# Patient Record
Sex: FEMALE | Race: WHITE | NOT HISPANIC OR LATINO | Employment: OTHER | URBAN - METROPOLITAN AREA
[De-identification: names, ages, dates, MRNs, and addresses within clinical notes are randomized per-mention and may not be internally consistent; named-entity substitution may affect disease eponyms.]

---

## 2019-11-12 ENCOUNTER — OFFICE VISIT (OUTPATIENT)
Dept: FAMILY MEDICINE CLINIC | Facility: CLINIC | Age: 64
End: 2019-11-12

## 2019-11-12 VITALS
HEIGHT: 64 IN | DIASTOLIC BLOOD PRESSURE: 80 MMHG | WEIGHT: 172 LBS | OXYGEN SATURATION: 94 % | HEART RATE: 81 BPM | BODY MASS INDEX: 29.37 KG/M2 | SYSTOLIC BLOOD PRESSURE: 140 MMHG

## 2019-11-12 DIAGNOSIS — E78.5 HYPERLIPIDEMIA, UNSPECIFIED HYPERLIPIDEMIA TYPE: ICD-10-CM

## 2019-11-12 DIAGNOSIS — Z86.73 HISTORY OF STROKE: ICD-10-CM

## 2019-11-12 DIAGNOSIS — F33.0 MILD EPISODE OF RECURRENT MAJOR DEPRESSIVE DISORDER (HCC): ICD-10-CM

## 2019-11-12 DIAGNOSIS — K21.9 GASTROESOPHAGEAL REFLUX DISEASE WITHOUT ESOPHAGITIS: ICD-10-CM

## 2019-11-12 DIAGNOSIS — I10 ESSENTIAL HYPERTENSION: Primary | ICD-10-CM

## 2019-11-12 PROBLEM — I63.9 STROKE: Status: ACTIVE | Noted: 2019-11-12

## 2019-11-12 PROCEDURE — 99204 OFFICE O/P NEW MOD 45 MIN: CPT | Performed by: FAMILY MEDICINE

## 2019-11-12 RX ORDER — ATORVASTATIN CALCIUM 40 MG/1
40 TABLET, FILM COATED ORAL DAILY
COMMUNITY
End: 2019-11-12 | Stop reason: SINTOL

## 2019-11-12 RX ORDER — OMEPRAZOLE 40 MG/1
1 CAPSULE, DELAYED RELEASE ORAL 2 TIMES DAILY
COMMUNITY
Start: 2019-10-03 | End: 2019-11-12 | Stop reason: SDUPTHER

## 2019-11-12 RX ORDER — OMEPRAZOLE 40 MG/1
40 CAPSULE, DELAYED RELEASE ORAL 2 TIMES DAILY
Qty: 60 CAPSULE | Refills: 11 | Status: SHIPPED | OUTPATIENT
Start: 2019-11-12 | End: 2020-02-12 | Stop reason: SDUPTHER

## 2019-11-12 RX ORDER — CITALOPRAM 40 MG/1
1 TABLET ORAL DAILY
COMMUNITY
Start: 2019-11-01 | End: 2020-02-12 | Stop reason: SDUPTHER

## 2019-11-12 RX ORDER — BACLOFEN 20 MG/1
1 TABLET ORAL 4 TIMES DAILY
COMMUNITY
Start: 2019-11-11 | End: 2020-02-14 | Stop reason: SDUPTHER

## 2019-11-12 RX ORDER — LISINOPRIL AND HYDROCHLOROTHIAZIDE 20; 12.5 MG/1; MG/1
1 TABLET ORAL DAILY
COMMUNITY
Start: 2019-11-07 | End: 2020-02-12 | Stop reason: SDUPTHER

## 2019-11-12 RX ORDER — OXYBUTYNIN CHLORIDE 5 MG/1
1 TABLET ORAL NIGHTLY
COMMUNITY
Start: 2019-11-01 | End: 2020-03-20 | Stop reason: SDUPTHER

## 2019-11-12 RX ORDER — KETOCONAZOLE 20 MG/ML
SHAMPOO TOPICAL
COMMUNITY
Start: 2019-10-03 | End: 2020-09-22 | Stop reason: SDUPTHER

## 2019-11-12 RX ORDER — AMLODIPINE BESYLATE 5 MG/1
1 TABLET ORAL DAILY
COMMUNITY
Start: 2019-11-07 | End: 2020-02-12 | Stop reason: SDUPTHER

## 2020-02-12 DIAGNOSIS — K21.9 GASTROESOPHAGEAL REFLUX DISEASE WITHOUT ESOPHAGITIS: ICD-10-CM

## 2020-02-12 RX ORDER — LISINOPRIL AND HYDROCHLOROTHIAZIDE 20; 12.5 MG/1; MG/1
1 TABLET ORAL DAILY
Qty: 30 TABLET | Refills: 5 | Status: SHIPPED | OUTPATIENT
Start: 2020-02-12 | End: 2020-03-20 | Stop reason: SDUPTHER

## 2020-02-12 RX ORDER — CITALOPRAM 40 MG/1
40 TABLET ORAL DAILY
Qty: 30 TABLET | Refills: 5 | Status: SHIPPED | OUTPATIENT
Start: 2020-02-12 | End: 2020-03-20 | Stop reason: SDUPTHER

## 2020-02-12 RX ORDER — AMLODIPINE BESYLATE 5 MG/1
5 TABLET ORAL DAILY
Qty: 30 TABLET | Refills: 5 | Status: SHIPPED | OUTPATIENT
Start: 2020-02-12 | End: 2020-03-20 | Stop reason: SDUPTHER

## 2020-02-12 RX ORDER — OMEPRAZOLE 40 MG/1
40 CAPSULE, DELAYED RELEASE ORAL 2 TIMES DAILY
Qty: 60 CAPSULE | Refills: 11 | Status: SHIPPED | OUTPATIENT
Start: 2020-02-12 | End: 2020-03-20 | Stop reason: SDUPTHER

## 2020-02-12 NOTE — TELEPHONE ENCOUNTER
I have loaded all of the prescriptions that you mentioned in your last note. Please advise on the rest of her prescriptions.

## 2020-02-12 NOTE — TELEPHONE ENCOUNTER
PT CALLED REQUESTING REFILLS ALL MEDICATIONS WITH REFILLS    [facility]  [facility]    PT CALL BACK [phone]

## 2020-02-12 NOTE — TELEPHONE ENCOUNTER
I believe she should still see her neurologist regarding the baclofen, but did not recall discussing it directly with her.  Similar to her , if we are needing to take other medications over from other providers I am happy to if we can get their records, otherwise approve the included meds.

## 2020-02-14 RX ORDER — BACLOFEN 20 MG/1
20 TABLET ORAL 4 TIMES DAILY
Qty: 120 TABLET | Refills: 0 | Status: SHIPPED | OUTPATIENT
Start: 2020-02-14 | End: 2020-03-11

## 2020-02-20 ENCOUNTER — TELEPHONE (OUTPATIENT)
Dept: FAMILY MEDICINE CLINIC | Facility: CLINIC | Age: 65
End: 2020-02-20

## 2020-02-20 DIAGNOSIS — J30.89 ALLERGIC RHINITIS DUE TO OTHER ALLERGIC TRIGGER, UNSPECIFIED SEASONALITY: Primary | ICD-10-CM

## 2020-02-20 NOTE — TELEPHONE ENCOUNTER
Patient called in regards to needing a prescription on nasal spray (FLUTICASONE) PROPIONATE.     Sent to the [facility] pharmacy on [address]     Please advise pt at [phone].

## 2020-02-21 RX ORDER — FLUTICASONE PROPIONATE 50 MCG
1 SPRAY, SUSPENSION (ML) NASAL DAILY
Qty: 9.9 ML | Refills: 11 | Status: SHIPPED | OUTPATIENT
Start: 2020-02-21 | End: 2020-03-20 | Stop reason: SDUPTHER

## 2020-03-11 RX ORDER — BACLOFEN 20 MG/1
TABLET ORAL
Qty: 120 TABLET | Refills: 0 | Status: SHIPPED | OUTPATIENT
Start: 2020-03-11 | End: 2020-03-20 | Stop reason: SDUPTHER

## 2020-03-20 DIAGNOSIS — J30.89 ALLERGIC RHINITIS DUE TO OTHER ALLERGIC TRIGGER, UNSPECIFIED SEASONALITY: ICD-10-CM

## 2020-03-20 DIAGNOSIS — K21.9 GASTROESOPHAGEAL REFLUX DISEASE WITHOUT ESOPHAGITIS: ICD-10-CM

## 2020-03-20 RX ORDER — AMLODIPINE BESYLATE 5 MG/1
5 TABLET ORAL DAILY
Qty: 30 TABLET | Refills: 5 | Status: SHIPPED | OUTPATIENT
Start: 2020-03-20 | End: 2020-10-22

## 2020-03-20 RX ORDER — CITALOPRAM 40 MG/1
40 TABLET ORAL DAILY
Qty: 30 TABLET | Refills: 5 | Status: SHIPPED | OUTPATIENT
Start: 2020-03-20 | End: 2020-10-07

## 2020-03-20 RX ORDER — FLUTICASONE PROPIONATE 50 MCG
1 SPRAY, SUSPENSION (ML) NASAL DAILY
Qty: 9.9 ML | Refills: 11 | Status: SHIPPED | OUTPATIENT
Start: 2020-03-20 | End: 2021-01-14 | Stop reason: SDUPTHER

## 2020-03-20 RX ORDER — BACLOFEN 20 MG/1
20 TABLET ORAL 4 TIMES DAILY
Qty: 120 TABLET | Refills: 0 | Status: SHIPPED | OUTPATIENT
Start: 2020-03-20 | End: 2020-05-07

## 2020-03-20 RX ORDER — OXYBUTYNIN CHLORIDE 5 MG/1
5 TABLET ORAL NIGHTLY
Qty: 30 TABLET | Refills: 1 | Status: SHIPPED | OUTPATIENT
Start: 2020-03-20 | End: 2021-09-09 | Stop reason: SDUPTHER

## 2020-03-20 RX ORDER — OMEPRAZOLE 40 MG/1
40 CAPSULE, DELAYED RELEASE ORAL 2 TIMES DAILY
Qty: 60 CAPSULE | Refills: 11 | Status: SHIPPED | OUTPATIENT
Start: 2020-03-20 | End: 2021-01-14 | Stop reason: SDUPTHER

## 2020-03-20 RX ORDER — LISINOPRIL AND HYDROCHLOROTHIAZIDE 20; 12.5 MG/1; MG/1
1 TABLET ORAL DAILY
Qty: 30 TABLET | Refills: 5 | Status: SHIPPED | OUTPATIENT
Start: 2020-03-20 | End: 2020-10-07

## 2020-05-07 RX ORDER — BACLOFEN 20 MG/1
TABLET ORAL
Qty: 120 TABLET | Refills: 0 | Status: SHIPPED | OUTPATIENT
Start: 2020-05-07 | End: 2020-07-01

## 2020-05-13 RX ORDER — BACLOFEN 20 MG/1
TABLET ORAL
Qty: 120 TABLET | Refills: 0 | OUTPATIENT
Start: 2020-05-13

## 2020-05-26 ENCOUNTER — OFFICE VISIT (OUTPATIENT)
Dept: FAMILY MEDICINE CLINIC | Facility: CLINIC | Age: 65
End: 2020-05-26

## 2020-05-26 VITALS
WEIGHT: 155 LBS | HEART RATE: 79 BPM | HEIGHT: 64 IN | BODY MASS INDEX: 26.46 KG/M2 | DIASTOLIC BLOOD PRESSURE: 78 MMHG | SYSTOLIC BLOOD PRESSURE: 122 MMHG | OXYGEN SATURATION: 96 %

## 2020-05-26 DIAGNOSIS — R06.02 SHORTNESS OF BREATH: ICD-10-CM

## 2020-05-26 DIAGNOSIS — F33.0 MILD EPISODE OF RECURRENT MAJOR DEPRESSIVE DISORDER (HCC): ICD-10-CM

## 2020-05-26 DIAGNOSIS — E78.5 HYPERLIPIDEMIA, UNSPECIFIED HYPERLIPIDEMIA TYPE: ICD-10-CM

## 2020-05-26 DIAGNOSIS — Z87.891 PERSONAL HISTORY OF TOBACCO USE, PRESENTING HAZARDS TO HEALTH: ICD-10-CM

## 2020-05-26 DIAGNOSIS — R73.9 HYPERGLYCEMIA: ICD-10-CM

## 2020-05-26 DIAGNOSIS — Z00.00 PREVENTATIVE HEALTH CARE: ICD-10-CM

## 2020-05-26 DIAGNOSIS — Z00.00 MEDICARE ANNUAL WELLNESS VISIT, SUBSEQUENT: Primary | ICD-10-CM

## 2020-05-26 DIAGNOSIS — I10 ESSENTIAL HYPERTENSION: ICD-10-CM

## 2020-05-26 PROCEDURE — 99407 BEHAV CHNG SMOKING > 10 MIN: CPT | Performed by: FAMILY MEDICINE

## 2020-05-26 PROCEDURE — 99396 PREV VISIT EST AGE 40-64: CPT | Performed by: FAMILY MEDICINE

## 2020-05-26 PROCEDURE — G0439 PPPS, SUBSEQ VISIT: HCPCS | Performed by: FAMILY MEDICINE

## 2020-05-26 PROCEDURE — 96160 PT-FOCUSED HLTH RISK ASSMT: CPT | Performed by: FAMILY MEDICINE

## 2020-05-26 NOTE — PROGRESS NOTES
QUICK REFERENCE INFORMATION:  The ABCs of the Annual Wellness Visit    Medicare Subsequent Wellness Visit    Chief Complaint   Patient presents with   • Medicare Wellness-subsequent        HPI     Johnna Contreras is a 64 y.o. female presenting for subsequent annual wellness visit.    Chronic health conditions:  HTN: Caused a stroke in 2015. Has been well controlled since then  HLD: Previously placed on atorvastatin but had elevated liver enzymes and had to stop  Wheelchair-bound since stroke. Can walk with cane but not well  Depression: Stable on citalopram, recently increased to 40mg  GERD: Does well with omeprazole 40mg BID  Smoker: Cut from 2 ppd down 1/2 ppd  Had been going to the gym regularly, improved her diet, lost almost 25 lbs up until pandemic. Was doing leg presses up to 60lbs each.    Other physicians currently involved in patient's care:  Dr. Yoly angel in arm and leg every 3 months, just had done a few weeks ago.    This patient is accompanied by their self who contributes to the history of their care.    Past Medical History:   Diagnosis Date   • GERD (gastroesophageal reflux disease)    • Headache    • Hyperlipidemia    • Hypertension    • Stroke (CMS/HCC)       Past Surgical History:   Procedure Laterality Date   • DILATATION AND CURETTAGE     • EYE SURGERY     • TONSILLECTOMY     • TUBAL ABDOMINAL LIGATION       Family History   Problem Relation Age of Onset   • Heart attack Mother    • Stroke Mother    • Hypertension Mother    • Hyperlipidemia Mother    • Diabetes Father    • Cancer Paternal Grandmother    • Stroke Paternal Grandmother    • Heart attack Paternal Grandmother       Social History     Socioeconomic History   • Marital status:      Spouse name: Not on file   • Number of children: Not on file   • Years of education: Not on file   • Highest education level: Not on file   Tobacco Use   • Smoking status: Current Every Day Smoker     Packs/day: 0.50   • Smokeless tobacco: Never  "Used   Substance and Sexual Activity   • Alcohol use: Yes     Frequency: Never     Comment: rarely    • Drug use: No      No Known Allergies   Outpatient Medications Prior to Visit   Medication Sig Dispense Refill   • amLODIPine (NORVASC) 5 MG tablet Take 1 tablet by mouth Daily. 30 tablet 5   • aspirin 81 MG tablet Take 1 tablet by mouth Daily.     • baclofen (LIORESAL) 20 MG tablet TAKE 1 TABLET FOUR TIMES DAILY 120 tablet 0   • citalopram (CeleXA) 40 MG tablet Take 1 tablet by mouth Daily. 30 tablet 5   • fluticasone (Flonase) 50 MCG/ACT nasal spray 1 spray into the nostril(s) as directed by provider Daily. 9.9 mL 11   • hydrocortisone (ANUSOL-HC) 2.5 % rectal cream Insert  into the rectum 2 (Two) Times a Day.     • ketoconazole (NIZORAL) 2 % shampoo      • lisinopril-hydrochlorothiazide (PRINZIDE,ZESTORETIC) 20-12.5 MG per tablet Take 1 tablet by mouth Daily. 30 tablet 5   • omeprazole (priLOSEC) 40 MG capsule Take 1 capsule by mouth 2 (Two) Times a Day. 60 capsule 11   • oxybutynin (DITROPAN) 5 MG tablet Take 1 tablet by mouth Every Night. 30 tablet 1     No facility-administered medications prior to visit.        Reviewed use of high risk medication in the elderly: yes  Reviewed for potential of harmful drug interactions in the elderly: yes    The following portions of the patient's history were reviewed and updated as appropriate: allergies, current medications, past family history, past medical history, past social history, past surgical history and problem list.    Review of Systems   Review of Systems -  General ROS: negative for - chills, fever or night sweats  Cardiovascular ROS: no chest pain or dyspnea on exertion  Gastrointestinal ROS: no abdominal pain, change in bowel habits, or black or bloody stools  Genito-Urinary ROS: no dysuria, trouble voiding, or hematuria.    Vitals:    05/26/20 0810   BP: 122/78   Pulse: 79   SpO2: 96%   Weight: 70.3 kg (155 lb)   Height: 162.6 cm (64\")       Objective  "   Physical Exam   Const: NAD, A&Ox4, Pleasant, Cooperative  Eyes: EOMI, no conjunctivitis  ENT: No nasal discharge present, neck supple  Cardiac: Regular rate and rhythm, no cyanosis  Resp: Respiratory rate within normal limits, no increased work of breathing, no audible wheezing or retractions noted  GI: No distention or ascites  MSK: Motor and sensation grossly intact in bilateral upper extremities  Neurologic: CN II-XII grossly intact  Psych: Appropriate mood and behavior.  HEALTH RISK ASSESSMENT    1955    Recent Hospitalizations:  No hospitalization(s) within the last year..      Current Medical Providers:  Patient Care Team:  Ras Padilla DO as PCP - General (Family Medicine)      Smoking Status:  Social History     Tobacco Use   Smoking Status Current Every Day Smoker   • Packs/day: 0.50   Smokeless Tobacco Never Used       Alcohol Consumption:  Social History     Substance and Sexual Activity   Alcohol Use Yes   • Frequency: Never    Comment: rarely        Depression Screen:   No flowsheet data found.    Health Habits and Functional and Cognitive Screening:  Functional & Cognitive Status 5/26/2020   Do you have difficulty preparing food and eating? No   Do you have difficulty bathing yourself, getting dressed or grooming yourself? No   Do you have difficulty using the toilet? No   Do you have difficulty moving around from place to place? No   Do you have trouble with steps or getting out of a bed or a chair? No   Current Diet Limited Junk Food   Dental Exam Not up to date   Eye Exam Up to date   Exercise (times per week) 0 times per week   Current Exercise Activities Include None   Do you need help using the phone?  No   Are you deaf or do you have serious difficulty hearing?  Yes   Do you need help with transportation? Yes   Do you need help shopping? No   Do you need help preparing meals?  No   Do you need help with housework?  Yes   Do you need help with laundry? No   Do you need help taking  your medications? No   Do you need help managing money? No   Do you ever drive or ride in a car without wearing a seat belt? No   Have you felt unusual stress, anger or loneliness in the last month? Yes   Who do you live with? Spouse   If you need help, do you have trouble finding someone available to you? No   Have you been bothered in the last four weeks by sexual problems? No   Do you have difficulty concentrating, remembering or making decisions? No           Does the patient have evidence of cognitive impairment? No    Aspirin use counseling? Taking ASA appropriately as indicated      Recent Lab Results:  CMP:  Lab Results   Component Value Date    BUN 35 (H) 05/07/2015    CREATININE 1.0 05/07/2015     05/07/2015    K 3.6 05/07/2015    CO2 25 05/07/2015    CALCIUM 9.3 05/07/2015    ALBUMIN 4.0 05/03/2015    BILITOT 0.4 05/03/2015    ALKPHOS 103 (H) 05/03/2015    AST 24 05/03/2015    ALT 21 05/03/2015     Lipid Panel:  Lab Results   Component Value Date    TRIG 65 05/03/2015    HDL 80 (H) 05/03/2015     HbA1c:  Lab Results   Component Value Date    HGBA1C 5.4 05/03/2015       Visual Acuity:  No exam data present    Age-appropriate Screening Schedule:  Refer to the list below for future screening recommendations based on patient's age, sex and/or medical conditions. Orders for these recommended tests are listed in the plan section. The patient has been provided with a written plan.    Health Maintenance   Topic Date Due   • MAMMOGRAM  1955   • TDAP/TD VACCINES (1 - Tdap) 06/09/1967   • PNEUMOCOCCAL VACCINE (19-64 MEDIUM RISK) (1 of 1 - PPSV23) 06/09/1975   • ZOSTER VACCINE (1 of 2) 06/09/2006   • PAP SMEAR  11/12/2019   • LIPID PANEL  11/12/2019   • COLONOSCOPY  11/12/2019   • INFLUENZA VACCINE  08/01/2020          Advance Care Planning:  ACP discussion was held with the patient during this visit. Patient does not have an advance directive, information provided.    Identification of Risk  Factors:  Risk factors include: Advance Directive Discussion  Cardiovascular risk  Depression/Dysphoria  Diabetic Lab Screening   Fall Risk  Lung Cancer Risk  Tobacco Use/Dependance (use dotphrase .tobaccocessation for documentation)  Urinary Incontinence.    Compared to one year ago, the patient feels her physical health is better.  Compared to one year ago, the patient feels her mental health is the same.      Johnna was seen today for medicare wellness-subsequent.    Diagnoses and all orders for this visit:    Medicare annual wellness visit, subsequent  -     Comprehensive Metabolic Panel; Future  -     CBC & Differential; Future  -     High Sensitivity CRP; Future  -     Lipid Panel; Future  -     TSH Rfx On Abnormal To Free T4; Future  -     Urinalysis With Microscopic If Indicated (No Culture) - Urine, Clean Catch; Future    Preventative health care  -     Comprehensive Metabolic Panel; Future  -     CBC & Differential; Future  -     High Sensitivity CRP; Future  -     Lipid Panel; Future  -     TSH Rfx On Abnormal To Free T4; Future  -     Urinalysis With Microscopic If Indicated (No Culture) - Urine, Clean Catch; Future    Hyperlipidemia, unspecified hyperlipidemia type  -     Comprehensive Metabolic Panel; Future  -     CBC & Differential; Future  -     High Sensitivity CRP; Future  -     Lipid Panel; Future  -     TSH Rfx On Abnormal To Free T4; Future  -     Urinalysis With Microscopic If Indicated (No Culture) - Urine, Clean Catch; Future    Mild episode of recurrent major depressive disorder (CMS/HCC)  -     Comprehensive Metabolic Panel; Future  -     CBC & Differential; Future  -     Lipid Panel; Future  -     TSH Rfx On Abnormal To Free T4; Future  -     Urinalysis With Microscopic If Indicated (No Culture) - Urine, Clean Catch; Future    Essential hypertension  -     Comprehensive Metabolic Panel; Future  -     CBC & Differential; Future  -     High Sensitivity CRP; Future  -     Lipid Panel;  Future  -     TSH Rfx On Abnormal To Free T4; Future  -     Urinalysis With Microscopic If Indicated (No Culture) - Urine, Clean Catch; Future    Shortness of breath  -     Comprehensive Metabolic Panel; Future  -     CBC & Differential; Future  -     High Sensitivity CRP; Future  -     Lipid Panel; Future  -     TSH Rfx On Abnormal To Free T4; Future  -     Urinalysis With Microscopic If Indicated (No Culture) - Urine, Clean Catch; Future  -     proBNP; Future    Hyperglycemia  -     Hemoglobin A1c; Future    Personal history of tobacco use, presenting hazards to health  -     varenicline (Chantix Starting Month Baljeet) 0.5 MG X 11 & 1 MG X 42 tablet; Take 0.5 mg one daily on days 1-3 and and 0.5 mg twice daily on days 4-7.Then 1 mg twice daily for a total of 12 weeks.  -     CT Chest Low Dose Wo; Future        Procedure   Procedures       Patient Self-Management and Personalized Health Advice  The patient has been provided with information about: diet, exercise, weight management, tobacco cessation and designing advance directives and preventive services including:   · Annual Wellness Visit (AWV)  · Counseling to Prevent Tobacco Use (use of smartset and @cessation@ smartphrase for documentation)  · Depression Screening (15 minutes face to face, Code )  · Lung Cancer Screening Counseling and Annual Screening for Lung Cancer with Low Dose Computed Tomography (LDCT); (use of smartset for low dose CT for lung cancer screening for documentation and orders).    Johnna was seen today for medicare wellness-subsequent.    Diagnoses and all orders for this visit:    Medicare annual wellness visit, subsequent  -     Comprehensive Metabolic Panel; Future  -     CBC & Differential; Future  -     High Sensitivity CRP; Future  -     Lipid Panel; Future  -     TSH Rfx On Abnormal To Free T4; Future  -     Urinalysis With Microscopic If Indicated (No Culture) - Urine, Clean Catch; Future    Preventative health care  -      Comprehensive Metabolic Panel; Future  -     CBC & Differential; Future  -     High Sensitivity CRP; Future  -     Lipid Panel; Future  -     TSH Rfx On Abnormal To Free T4; Future  -     Urinalysis With Microscopic If Indicated (No Culture) - Urine, Clean Catch; Future    Hyperlipidemia, unspecified hyperlipidemia type  -     Comprehensive Metabolic Panel; Future  -     CBC & Differential; Future  -     High Sensitivity CRP; Future  -     Lipid Panel; Future  -     TSH Rfx On Abnormal To Free T4; Future  -     Urinalysis With Microscopic If Indicated (No Culture) - Urine, Clean Catch; Future    Mild episode of recurrent major depressive disorder (CMS/HCC)  -     Comprehensive Metabolic Panel; Future  -     CBC & Differential; Future  -     Lipid Panel; Future  -     TSH Rfx On Abnormal To Free T4; Future  -     Urinalysis With Microscopic If Indicated (No Culture) - Urine, Clean Catch; Future    Essential hypertension  -     Comprehensive Metabolic Panel; Future  -     CBC & Differential; Future  -     High Sensitivity CRP; Future  -     Lipid Panel; Future  -     TSH Rfx On Abnormal To Free T4; Future  -     Urinalysis With Microscopic If Indicated (No Culture) - Urine, Clean Catch; Future    Shortness of breath  -     Comprehensive Metabolic Panel; Future  -     CBC & Differential; Future  -     High Sensitivity CRP; Future  -     Lipid Panel; Future  -     TSH Rfx On Abnormal To Free T4; Future  -     Urinalysis With Microscopic If Indicated (No Culture) - Urine, Clean Catch; Future  -     proBNP; Future    Hyperglycemia  -     Hemoglobin A1c; Future    Personal history of tobacco use, presenting hazards to health  -     varenicline (Chantix Starting Month Pak) 0.5 MG X 11 & 1 MG X 42 tablet; Take 0.5 mg one daily on days 1-3 and and 0.5 mg twice daily on days 4-7.Then 1 mg twice daily for a total of 12 weeks.  -     CT Chest Low Dose Wo; Future        Problem List Items Addressed This Visit         Cardiovascular and Mediastinum    Hyperlipidemia    Relevant Orders    Comprehensive Metabolic Panel    CBC & Differential    High Sensitivity CRP    Lipid Panel    TSH Rfx On Abnormal To Free T4    Urinalysis With Microscopic If Indicated (No Culture) - Urine, Clean Catch    Hypertension    Relevant Orders    Comprehensive Metabolic Panel    CBC & Differential    High Sensitivity CRP    Lipid Panel    TSH Rfx On Abnormal To Free T4    Urinalysis With Microscopic If Indicated (No Culture) - Urine, Clean Catch      Other Visit Diagnoses     Medicare annual wellness visit, subsequent    -  Primary    Relevant Orders    Comprehensive Metabolic Panel    CBC & Differential    High Sensitivity CRP    Lipid Panel    TSH Rfx On Abnormal To Free T4    Urinalysis With Microscopic If Indicated (No Culture) - Urine, Clean Catch    Preventative health care        Relevant Orders    Comprehensive Metabolic Panel    CBC & Differential    High Sensitivity CRP    Lipid Panel    TSH Rfx On Abnormal To Free T4    Urinalysis With Microscopic If Indicated (No Culture) - Urine, Clean Catch    Mild episode of recurrent major depressive disorder (CMS/HCC)        Relevant Medications    varenicline (Chantix Starting Month Baljeet) 0.5 MG X 11 & 1 MG X 42 tablet    Other Relevant Orders    Comprehensive Metabolic Panel    CBC & Differential    Lipid Panel    TSH Rfx On Abnormal To Free T4    Urinalysis With Microscopic If Indicated (No Culture) - Urine, Clean Catch    Shortness of breath        Relevant Orders    Comprehensive Metabolic Panel    CBC & Differential    High Sensitivity CRP    Lipid Panel    TSH Rfx On Abnormal To Free T4    Urinalysis With Microscopic If Indicated (No Culture) - Urine, Clean Catch    proBNP    Hyperglycemia        Relevant Orders    Hemoglobin A1c    Personal history of tobacco use, presenting hazards to health        Relevant Medications    varenicline (Chantix Starting Month Baljeet) 0.5 MG X 11 & 1 MG X 42 tablet     Other Relevant Orders    CT Chest Low Dose Wo        #1 hypertension  Adequate control on combination amlodipine 5 mg, lisinopril-HCTZ 20-12.5 mg     #2 hyperlipidemia  She has previously taken atorvastatin but had to stop per patient for elevated liver enzymes  She does have a history of stroke and should be on a statin in the future     3.  Major depressive disorder  Continue citalopram 40 mg daily     #4 GERD  Continue omeprazole 40 mg daily     #5 history of stroke  Continue daily aspirin  As stated above, she should be on a statin daily, with her history of elevated liver enzymes we discussed this, she wanted to hold off and check her cholesterol in the spring (today).  She is counseled that irrespective of her cholesterol level, unless her LDL is pretty naturally low, a statin would benefit her.    #5 personal history of tobacco use  Currently at half pack per day, previously a 2 pack/day for greater than 40 years.  She previously did well with Chantix, decreasing from 2 packs to half pack, but was lost to follow-up.  Johnna BERNARD Ben  reports that she has been smoking. She has been smoking about 0.50 packs per day. She has never used smokeless tobacco.. I have educated her on the risk of diseases from using tobacco products such as cancer, COPD and heart diease.     I advised her to quit and she is willing to quit. We have discussed the following method/s for tobacco cessation:  Education Material Prescription Medicaiton.  Together we have set a quit date for 2 weeks from today.  She will follow up with me in 3 months or sooner to check on her progress.    I spent 15 minutes counseling the patient.  -She is also in need of yearly low-dose CT    Patient Instructions   Health Maintenance, Female  Adopting a healthy lifestyle and getting preventive care are important in promoting health and wellness. Ask your health care provider about:  · The right schedule for you to have regular tests and exams.  · Things  you can do on your own to prevent diseases and keep yourself healthy.  What should I know about diet, weight, and exercise?  Eat a healthy diet    · Eat a diet that includes plenty of vegetables, fruits, low-fat dairy products, and lean protein.  · Do not eat a lot of foods that are high in solid fats, added sugars, or sodium.  Maintain a healthy weight  Body mass index (BMI) is used to identify weight problems. It estimates body fat based on height and weight. Your health care provider can help determine your BMI and help you achieve or maintain a healthy weight.  Get regular exercise  Get regular exercise. This is one of the most important things you can do for your health. Most adults should:  · Exercise for at least 150 minutes each week. The exercise should increase your heart rate and make you sweat (moderate-intensity exercise).  · Do strengthening exercises at least twice a week. This is in addition to the moderate-intensity exercise.  · Spend less time sitting. Even light physical activity can be beneficial.  Watch cholesterol and blood lipids  Have your blood tested for lipids and cholesterol at 20 years of age, then have this test every 5 years.  Have your cholesterol levels checked more often if:  · Your lipid or cholesterol levels are high.  · You are older than 40 years of age.  · You are at high risk for heart disease.  What should I know about cancer screening?  Depending on your health history and family history, you may need to have cancer screening at various ages. This may include screening for:  · Breast cancer.  · Cervical cancer.  · Colorectal cancer.  · Skin cancer.  · Lung cancer.  What should I know about heart disease, diabetes, and high blood pressure?  Blood pressure and heart disease  · High blood pressure causes heart disease and increases the risk of stroke. This is more likely to develop in people who have high blood pressure readings, are of  descent, or are  overweight.  · Have your blood pressure checked:  ? Every 3-5 years if you are 18-39 years of age.  ? Every year if you are 40 years old or older.  Diabetes  Have regular diabetes screenings. This checks your fasting blood sugar level. Have the screening done:  · Once every three years after age 40 if you are at a normal weight and have a low risk for diabetes.  · More often and at a younger age if you are overweight or have a high risk for diabetes.  What should I know about preventing infection?  Hepatitis B  If you have a higher risk for hepatitis B, you should be screened for this virus. Talk with your health care provider to find out if you are at risk for hepatitis B infection.  Hepatitis C  Testing is recommended for:  · Everyone born from 1945 through 1965.  · Anyone with known risk factors for hepatitis C.  Sexually transmitted infections (STIs)  · Get screened for STIs, including gonorrhea and chlamydia, if:  ? You are sexually active and are younger than 24 years of age.  ? You are older than 24 years of age and your health care provider tells you that you are at risk for this type of infection.  ? Your sexual activity has changed since you were last screened, and you are at increased risk for chlamydia or gonorrhea. Ask your health care provider if you are at risk.  · Ask your health care provider about whether you are at high risk for HIV. Your health care provider may recommend a prescription medicine to help prevent HIV infection. If you choose to take medicine to prevent HIV, you should first get tested for HIV. You should then be tested every 3 months for as long as you are taking the medicine.  Pregnancy  · If you are about to stop having your period (premenopausal) and you may become pregnant, seek counseling before you get pregnant.  · Take 400 to 800 micrograms (mcg) of folic acid every day if you become pregnant.  · Ask for birth control (contraception) if you want to prevent  pregnancy.  Osteoporosis and menopause  Osteoporosis is a disease in which the bones lose minerals and strength with aging. This can result in bone fractures. If you are 65 years old or older, or if you are at risk for osteoporosis and fractures, ask your health care provider if you should:  · Be screened for bone loss.  · Take a calcium or vitamin D supplement to lower your risk of fractures.  · Be given hormone replacement therapy (HRT) to treat symptoms of menopause.  Follow these instructions at home:  Lifestyle  · Do not use any products that contain nicotine or tobacco, such as cigarettes, e-cigarettes, and chewing tobacco. If you need help quitting, ask your health care provider.  · Do not use street drugs.  · Do not share needles.  · Ask your health care provider for help if you need support or information about quitting drugs.  Alcohol use  · Do not drink alcohol if:  ? Your health care provider tells you not to drink.  ? You are pregnant, may be pregnant, or are planning to become pregnant.  · If you drink alcohol:  ? Limit how much you use to 0-1 drink a day.  ? Limit intake if you are breastfeeding.  · Be aware of how much alcohol is in your drink. In the [place] one drink equals one 12 oz bottle of beer (355 mL), one 5 oz glass of wine (148 mL), or one 1½ oz glass of hard liquor (44 mL).  General instructions  · Schedule regular health, dental, and eye exams.  · Stay current with your vaccines.  · Tell your health care provider if:  ? You often feel depressed.  ? You have ever been abused or do not feel safe at home.  Summary  · Adopting a healthy lifestyle and getting preventive care are important in promoting health and wellness.  · Follow your health care provider's instructions about healthy diet, exercising, and getting tested or screened for diseases.  · Follow your health care provider's instructions on monitoring your cholesterol and blood pressure.  This information is not intended to replace  advice given to you by your health care provider. Make sure you discuss any questions you have with your health care provider.  Document Released: 07/02/2012 Document Revised: 12/11/2019 Document Reviewed: 12/11/2019  [employer] Patient Education © 2020 [employer]        The wellness exam has been reviewed in detail.  The patient has been fully counseled on preventative guidelines for vaccines, cancer screenings, and other health maintenance needs.  Functional testing has been performed to assess capacity for independent living and need for other medical interventions.  Screening for depression was completed via a validated screening model as indicated above, 15 minutes was spent in total on depression screening.  The patient was counseled on maintaining a lifestyle to promote good health and to minimize chronic diseases.  The patient has been assisted with scheduling healthcare procedures for the coming year and given a written document outlining these recommendations.    Follow Up:  Return in about 3 months (around 8/26/2020).     An After Visit Summary and PPPS with all of these plans were given to the patient.

## 2020-05-26 NOTE — PATIENT INSTRUCTIONS

## 2020-06-04 ENCOUNTER — TELEPHONE (OUTPATIENT)
Dept: FAMILY MEDICINE CLINIC | Facility: CLINIC | Age: 65
End: 2020-06-04

## 2020-06-04 DIAGNOSIS — Z87.891 PERSONAL HISTORY OF TOBACCO USE, PRESENTING HAZARDS TO HEALTH: ICD-10-CM

## 2020-06-04 NOTE — TELEPHONE ENCOUNTER
Carissa with [facility] called and stated that refills are needed on the following prescription(s):    varenicline (Chantix Starting Month Baljeet) 0.5 MG X 11 & 1 MG X 42 tablet    Directions: continue 1 Mg twice daily for 12 weeks.  An additional prescription will be needed    Pharmacy: [facility]  PH: [phone]  FX: [phone]    Please advise

## 2020-06-10 RX ORDER — VARENICLINE TARTRATE 1 MG/1
1 TABLET, FILM COATED ORAL 2 TIMES DAILY
Qty: 120 TABLET | Refills: 2 | Status: SHIPPED | OUTPATIENT
Start: 2020-06-10 | End: 2021-06-04 | Stop reason: SDUPTHER

## 2020-07-01 RX ORDER — BACLOFEN 20 MG/1
TABLET ORAL
Qty: 120 TABLET | Refills: 0 | Status: SHIPPED | OUTPATIENT
Start: 2020-07-01 | End: 2020-07-28

## 2020-07-28 RX ORDER — BACLOFEN 20 MG/1
TABLET ORAL
Qty: 120 TABLET | Refills: 0 | Status: SHIPPED | OUTPATIENT
Start: 2020-07-28 | End: 2020-09-15

## 2020-08-26 ENCOUNTER — OFFICE VISIT (OUTPATIENT)
Dept: FAMILY MEDICINE CLINIC | Facility: CLINIC | Age: 65
End: 2020-08-26

## 2020-08-26 ENCOUNTER — LAB REQUISITION (OUTPATIENT)
Dept: LAB | Facility: HOSPITAL | Age: 65
End: 2020-08-26

## 2020-08-26 VITALS
WEIGHT: 146 LBS | BODY MASS INDEX: 24.92 KG/M2 | DIASTOLIC BLOOD PRESSURE: 68 MMHG | OXYGEN SATURATION: 98 % | SYSTOLIC BLOOD PRESSURE: 138 MMHG | HEIGHT: 64 IN | HEART RATE: 63 BPM

## 2020-08-26 DIAGNOSIS — I10 ESSENTIAL HYPERTENSION: Primary | ICD-10-CM

## 2020-08-26 DIAGNOSIS — Z00.00 ROUTINE GENERAL MEDICAL EXAMINATION AT A HEALTH CARE FACILITY: ICD-10-CM

## 2020-08-26 DIAGNOSIS — Z87.891 PERSONAL HISTORY OF TOBACCO USE, PRESENTING HAZARDS TO HEALTH: ICD-10-CM

## 2020-08-26 PROCEDURE — 99214 OFFICE O/P EST MOD 30 MIN: CPT | Performed by: FAMILY MEDICINE

## 2020-08-27 LAB
ALBUMIN SERPL-MCNC: 4.5 G/DL (ref 3.5–5.2)
ALBUMIN/GLOB SERPL: 1.8 G/DL
ALP SERPL-CCNC: 84 U/L (ref 39–117)
ALT SERPL-CCNC: 17 U/L (ref 1–33)
APPEARANCE UR: CLEAR
AST SERPL-CCNC: 16 U/L (ref 1–32)
BACTERIA #/AREA URNS HPF: ABNORMAL /HPF
BASOPHILS # BLD AUTO: 0.05 10*3/MM3 (ref 0–0.2)
BASOPHILS NFR BLD AUTO: 0.6 % (ref 0–1.5)
BILIRUB SERPL-MCNC: 0.3 MG/DL (ref 0–1.2)
BILIRUB UR QL STRIP: NEGATIVE
BUN SERPL-MCNC: 10 MG/DL (ref 8–23)
BUN/CREAT SERPL: 13.9 (ref 7–25)
CALCIUM SERPL-MCNC: 9.6 MG/DL (ref 8.6–10.5)
CASTS URNS MICRO: ABNORMAL
CHLORIDE SERPL-SCNC: 94 MMOL/L (ref 98–107)
CHOLEST SERPL-MCNC: 190 MG/DL (ref 0–200)
CO2 SERPL-SCNC: 26.4 MMOL/L (ref 22–29)
COLOR UR: YELLOW
CREAT SERPL-MCNC: 0.72 MG/DL (ref 0.57–1)
CRP SERPL HS-MCNC: 2.7 MG/L (ref 0–3)
EOSINOPHIL # BLD AUTO: 0.14 10*3/MM3 (ref 0–0.4)
EOSINOPHIL NFR BLD AUTO: 1.7 % (ref 0.3–6.2)
EPI CELLS #/AREA URNS HPF: ABNORMAL /HPF
ERYTHROCYTE [DISTWIDTH] IN BLOOD BY AUTOMATED COUNT: 13.7 % (ref 12.3–15.4)
GLOBULIN SER CALC-MCNC: 2.5 GM/DL
GLUCOSE SERPL-MCNC: 90 MG/DL (ref 65–99)
GLUCOSE UR QL: NEGATIVE
HBA1C MFR BLD: 6.2 % (ref 4.8–5.6)
HCT VFR BLD AUTO: 41 % (ref 34–46.6)
HDLC SERPL-MCNC: 66 MG/DL (ref 40–60)
HGB BLD-MCNC: 13.8 G/DL (ref 12–15.9)
HGB UR QL STRIP: NEGATIVE
IMM GRANULOCYTES # BLD AUTO: 0.01 10*3/MM3 (ref 0–0.05)
IMM GRANULOCYTES NFR BLD AUTO: 0.1 % (ref 0–0.5)
KETONES UR QL STRIP: NEGATIVE
LDLC SERPL CALC-MCNC: 110 MG/DL (ref 0–100)
LEUKOCYTE ESTERASE UR QL STRIP: ABNORMAL
LYMPHOCYTES # BLD AUTO: 1.97 10*3/MM3 (ref 0.7–3.1)
LYMPHOCYTES NFR BLD AUTO: 23.8 % (ref 19.6–45.3)
MCH RBC QN AUTO: 29.1 PG (ref 26.6–33)
MCHC RBC AUTO-ENTMCNC: 33.7 G/DL (ref 31.5–35.7)
MCV RBC AUTO: 86.3 FL (ref 79–97)
MONOCYTES # BLD AUTO: 0.6 10*3/MM3 (ref 0.1–0.9)
MONOCYTES NFR BLD AUTO: 7.2 % (ref 5–12)
NEUTROPHILS # BLD AUTO: 5.51 10*3/MM3 (ref 1.7–7)
NEUTROPHILS NFR BLD AUTO: 66.6 % (ref 42.7–76)
NITRITE UR QL STRIP: NEGATIVE
NRBC BLD AUTO-RTO: 0 /100 WBC (ref 0–0.2)
NT-PROBNP SERPL-MCNC: 101 PG/ML (ref 0–287)
PH UR STRIP: 7.5 [PH] (ref 5–8)
PLATELET # BLD AUTO: 317 10*3/MM3 (ref 140–450)
POTASSIUM SERPL-SCNC: 3.7 MMOL/L (ref 3.5–5.2)
PROT SERPL-MCNC: 7 G/DL (ref 6–8.5)
PROT UR QL STRIP: NEGATIVE
RBC # BLD AUTO: 4.75 10*6/MM3 (ref 3.77–5.28)
RBC #/AREA URNS HPF: ABNORMAL /HPF
SODIUM SERPL-SCNC: 131 MMOL/L (ref 136–145)
SP GR UR: 1.01 (ref 1–1.03)
TRIGL SERPL-MCNC: 69 MG/DL (ref 0–150)
TSH SERPL DL<=0.005 MIU/L-ACNC: 1.48 UIU/ML (ref 0.27–4.2)
UROBILINOGEN UR STRIP-MCNC: ABNORMAL MG/DL
VLDLC SERPL CALC-MCNC: 13.8 MG/DL
WBC # BLD AUTO: 8.28 10*3/MM3 (ref 3.4–10.8)
WBC #/AREA URNS HPF: ABNORMAL /HPF

## 2020-09-10 ENCOUNTER — HOSPITAL ENCOUNTER (OUTPATIENT)
Dept: CT IMAGING | Facility: HOSPITAL | Age: 65
Discharge: HOME | End: 2020-09-10
Admitting: FAMILY MEDICINE

## 2020-09-10 DIAGNOSIS — Z87.891 PERSONAL HISTORY OF TOBACCO USE, PRESENTING HAZARDS TO HEALTH: ICD-10-CM

## 2020-09-10 PROCEDURE — G0297 LDCT FOR LUNG CA SCREEN: HCPCS

## 2020-09-15 RX ORDER — BACLOFEN 20 MG/1
TABLET ORAL
Qty: 120 TABLET | Refills: 0 | Status: SHIPPED | OUTPATIENT
Start: 2020-09-15 | End: 2020-10-07

## 2020-09-22 RX ORDER — KETOCONAZOLE 20 MG/ML
SHAMPOO TOPICAL WEEKLY
Qty: 1 EACH | Refills: 1 | Status: SHIPPED | OUTPATIENT
Start: 2020-09-22 | End: 2020-11-23 | Stop reason: SDUPTHER

## 2020-09-22 NOTE — TELEPHONE ENCOUNTER
PT CALLED REQUESTING A REFILL FOR:  ketoconazole (NIZORAL) 2 % shampoo    [facility] - [place], [place] - [address] - [phone] [facility] [phone] FX

## 2020-10-07 RX ORDER — CITALOPRAM 40 MG/1
TABLET ORAL
Qty: 90 TABLET | Refills: 1 | Status: SHIPPED | OUTPATIENT
Start: 2020-10-07 | End: 2021-01-14 | Stop reason: SDUPTHER

## 2020-10-07 RX ORDER — LISINOPRIL AND HYDROCHLOROTHIAZIDE 20; 12.5 MG/1; MG/1
TABLET ORAL
Qty: 90 TABLET | Refills: 1 | Status: SHIPPED | OUTPATIENT
Start: 2020-10-07 | End: 2021-01-14 | Stop reason: SDUPTHER

## 2020-10-07 RX ORDER — BACLOFEN 20 MG/1
TABLET ORAL
Qty: 120 TABLET | Refills: 0 | Status: SHIPPED | OUTPATIENT
Start: 2020-10-07 | End: 2020-10-22

## 2020-10-22 RX ORDER — AMLODIPINE BESYLATE 5 MG/1
TABLET ORAL
Qty: 90 TABLET | Refills: 3 | Status: SHIPPED | OUTPATIENT
Start: 2020-10-22 | End: 2021-01-14 | Stop reason: SDUPTHER

## 2020-10-22 RX ORDER — BACLOFEN 20 MG/1
TABLET ORAL
Qty: 120 TABLET | Refills: 0 | Status: SHIPPED | OUTPATIENT
Start: 2020-10-22 | End: 2020-11-24 | Stop reason: SDUPTHER

## 2020-11-24 RX ORDER — BACLOFEN 20 MG/1
TABLET ORAL
Qty: 120 TABLET | Refills: 0 | Status: SHIPPED | OUTPATIENT
Start: 2020-11-24 | End: 2021-01-14 | Stop reason: SDUPTHER

## 2020-11-24 RX ORDER — KETOCONAZOLE 20 MG/ML
SHAMPOO TOPICAL WEEKLY
Qty: 120 ML | Refills: 2 | Status: SHIPPED | OUTPATIENT
Start: 2020-11-24 | End: 2021-01-14 | Stop reason: SDUPTHER

## 2020-12-09 ENCOUNTER — TELEMEDICINE (OUTPATIENT)
Dept: FAMILY MEDICINE CLINIC | Facility: CLINIC | Age: 65
End: 2020-12-09

## 2020-12-09 DIAGNOSIS — Z12.11 COLON CANCER SCREENING: ICD-10-CM

## 2020-12-09 DIAGNOSIS — I10 ESSENTIAL HYPERTENSION: Primary | ICD-10-CM

## 2020-12-09 DIAGNOSIS — B07.9 VIRAL WARTS, UNSPECIFIED TYPE: ICD-10-CM

## 2020-12-09 DIAGNOSIS — Z87.891 PERSONAL HISTORY OF TOBACCO USE, PRESENTING HAZARDS TO HEALTH: ICD-10-CM

## 2020-12-09 DIAGNOSIS — R91.1 SOLID NODULE OF LUNG 6 MM TO 8 MM IN DIAMETER: ICD-10-CM

## 2020-12-09 PROCEDURE — 99214 OFFICE O/P EST MOD 30 MIN: CPT | Performed by: FAMILY MEDICINE

## 2020-12-09 NOTE — PATIENT INSTRUCTIONS
1.  Have colonoscopy and mammogram completed    2.  Have pneumonia shot at local pharmacy    3.  Cutaneous Wart Care:  The best approach for non-genital cutaneous warts includes the use of 17% salicylic acid solution, available over-the-counter in either liquid or gel preparation. Soak the wart area in warm water with Epsom salt for 5-10 minutes, then filed down the thick skin covering the lesion with a pumice stone or emery port. Do not use fingernail clippers on the wart. Apply salicylic acid to the wart, cover with a small piece of duct tape. Repeat daily. If using a salicylic acid patch, repeat above procedure every other day. Mild redness in the treatment area is common, however he should discontinue treatment if you have severe spreading redness, pain, or severe itching.    Cutaneous warts can take up to 12 weeks to clear. If the wart persists past 12 weeks of treatment, stop treatment and make an appointment with your physician.

## 2020-12-28 DIAGNOSIS — Z12.11 SCREENING FOR COLON CANCER: Primary | ICD-10-CM

## 2020-12-28 RX ORDER — SODIUM, POTASSIUM,MAG SULFATES 17.5-3.13G
1 SOLUTION, RECONSTITUTED, ORAL ORAL TAKE AS DIRECTED
Qty: 2 BOTTLE | Refills: 0 | Status: SHIPPED | OUTPATIENT
Start: 2020-12-28 | End: 2021-06-04

## 2020-12-29 ENCOUNTER — TELEPHONE (OUTPATIENT)
Dept: FAMILY MEDICINE CLINIC | Facility: CLINIC | Age: 65
End: 2020-12-29

## 2020-12-29 DIAGNOSIS — B07.9 VIRAL WARTS, UNSPECIFIED TYPE: ICD-10-CM

## 2020-12-29 NOTE — TELEPHONE ENCOUNTER
Patient called asking if her prescription for salicylic acid-lactic acid 17 % external solution can be sent to [facility], it was sent to [facility].  Thank you!

## 2021-01-04 ENCOUNTER — APPOINTMENT (OUTPATIENT)
Dept: PREADMISSION TESTING | Facility: HOSPITAL | Age: 66
End: 2021-01-04

## 2021-01-04 PROCEDURE — U0004 COV-19 TEST NON-CDC HGH THRU: HCPCS

## 2021-01-04 PROCEDURE — C9803 HOPD COVID-19 SPEC COLLECT: HCPCS

## 2021-01-05 LAB — SARS-COV-2 RNA RESP QL NAA+PROBE: NOT DETECTED

## 2021-01-07 ENCOUNTER — OUTSIDE FACILITY SERVICE (OUTPATIENT)
Dept: GASTROENTEROLOGY | Facility: CLINIC | Age: 66
End: 2021-01-07

## 2021-01-07 PROCEDURE — 45385 COLONOSCOPY W/LESION REMOVAL: CPT | Performed by: INTERNAL MEDICINE

## 2021-01-07 PROCEDURE — 88305 TISSUE EXAM BY PATHOLOGIST: CPT | Performed by: INTERNAL MEDICINE

## 2021-01-07 PROCEDURE — 45380 COLONOSCOPY AND BIOPSY: CPT | Performed by: INTERNAL MEDICINE

## 2021-01-08 ENCOUNTER — LAB REQUISITION (OUTPATIENT)
Dept: LAB | Facility: HOSPITAL | Age: 66
End: 2021-01-08

## 2021-01-08 DIAGNOSIS — Z12.11 ENCOUNTER FOR SCREENING FOR MALIGNANT NEOPLASM OF COLON: ICD-10-CM

## 2021-01-11 LAB
CYTO UR: NORMAL
LAB AP CASE REPORT: NORMAL
LAB AP CLINICAL INFORMATION: NORMAL
LAB AP DIAGNOSIS COMMENT: NORMAL
PATH REPORT.FINAL DX SPEC: NORMAL
PATH REPORT.GROSS SPEC: NORMAL

## 2021-01-14 DIAGNOSIS — K21.9 GASTROESOPHAGEAL REFLUX DISEASE WITHOUT ESOPHAGITIS: ICD-10-CM

## 2021-01-14 DIAGNOSIS — J30.89 ALLERGIC RHINITIS DUE TO OTHER ALLERGIC TRIGGER, UNSPECIFIED SEASONALITY: ICD-10-CM

## 2021-01-14 NOTE — TELEPHONE ENCOUNTER
Caller: Ben Johnna L    Relationship: Self    Best call back number: [phone]    Medication needed:   Requested Prescriptions     Pending Prescriptions Disp Refills   • lisinopril-hydrochlorothiazide (PRINZIDE,ZESTORETIC) 20-12.5 MG per tablet 90 tablet 1     Sig: Take 1 tablet by mouth Daily.   • amLODIPine (NORVASC) 5 MG tablet 90 tablet 3     Sig: Take 1 tablet by mouth Daily.   • fluticasone (Flonase) 50 MCG/ACT nasal spray 9.9 mL 11     Si spray into the nostril(s) as directed by provider Daily.   • ketoconazole (NIZORAL) 2 % shampoo 120 mL 2     Sig: Apply  topically to the appropriate area as directed 1 (One) Time Per Week.   • citalopram (CeleXA) 40 MG tablet 90 tablet 1     Sig: Take 1 tablet by mouth Daily.   • baclofen (LIORESAL) 20 MG tablet 120 tablet 0     Sig: Take 1 tablet by mouth 4 (Four) Times a Day.   • omeprazole (priLOSEC) 40 MG capsule 60 capsule 11     Sig: Take 1 capsule by mouth 2 (Two) Times a Day.       When do you need the refill by: 21    What details did the patient provide when requesting the medication: patient asked for a 3 month refill for all prescriptions    Does the patient have less than a 3 day supply:  [x] Yes  [] No    What is the patient's preferred pharmacy: [facility] - [address] [phone] [facility] [phone] FX

## 2021-01-15 RX ORDER — AMLODIPINE BESYLATE 5 MG/1
5 TABLET ORAL DAILY
Qty: 90 TABLET | Refills: 3 | Status: SHIPPED | OUTPATIENT
Start: 2021-01-15 | End: 2021-06-22

## 2021-01-15 RX ORDER — FLUTICASONE PROPIONATE 50 MCG
1 SPRAY, SUSPENSION (ML) NASAL DAILY
Qty: 9.9 ML | Refills: 11 | Status: SHIPPED | OUTPATIENT
Start: 2021-01-15 | End: 2021-08-31

## 2021-01-15 RX ORDER — BACLOFEN 20 MG/1
20 TABLET ORAL 4 TIMES DAILY
Qty: 120 TABLET | Refills: 0 | Status: SHIPPED | OUTPATIENT
Start: 2021-01-15 | End: 2021-03-04

## 2021-01-15 RX ORDER — LISINOPRIL AND HYDROCHLOROTHIAZIDE 20; 12.5 MG/1; MG/1
1 TABLET ORAL DAILY
Qty: 90 TABLET | Refills: 1 | Status: SHIPPED | OUTPATIENT
Start: 2021-01-15 | End: 2021-05-10

## 2021-01-15 RX ORDER — KETOCONAZOLE 20 MG/ML
SHAMPOO TOPICAL WEEKLY
Qty: 120 ML | Refills: 2 | Status: SHIPPED | OUTPATIENT
Start: 2021-01-15 | End: 2021-08-14 | Stop reason: SDUPTHER

## 2021-01-15 RX ORDER — OMEPRAZOLE 40 MG/1
40 CAPSULE, DELAYED RELEASE ORAL 2 TIMES DAILY
Qty: 60 CAPSULE | Refills: 11 | Status: SHIPPED | OUTPATIENT
Start: 2021-01-15 | End: 2021-06-22

## 2021-01-15 RX ORDER — CITALOPRAM 40 MG/1
40 TABLET ORAL DAILY
Qty: 90 TABLET | Refills: 1 | Status: SHIPPED | OUTPATIENT
Start: 2021-01-15 | End: 2021-05-10

## 2021-01-15 NOTE — TELEPHONE ENCOUNTER
Contacted patient, no answer. Left voicemail letting her know that her prescriptions were sent to the pharmacy on file & if she had any additional questions she could call the office back.

## 2021-01-15 NOTE — TELEPHONE ENCOUNTER
Omeprazole filled 03/20/2020  Baclofen filled 10/22/2020 but is marked as not taking  Citalopram filled 10/07/2020  Ketoconazole Filled 11/24/2020  Flonase filled 03/20/2020  Norvasc filled 10/22/2020  Lisinopril filled 10/07/2020

## 2021-01-20 ENCOUNTER — TELEPHONE (OUTPATIENT)
Dept: FAMILY MEDICINE CLINIC | Facility: CLINIC | Age: 66
End: 2021-01-20

## 2021-01-20 NOTE — TELEPHONE ENCOUNTER
Caller: MARYANN BOOKER    Relationship: SELF    Best call back number: [phone]      Which medication are you concerned about:   citalopram (CeleXA) 40 MG tablet      PLEASE ADVISE AND CALL PATIENT [phone]    Who prescribed you this medication: DR. JIMI SARAVIA ORIGINALLY. NOW DR. PORTILLO PRESCRIBES IT     What are your concerns: THE MG    How long have you been taking these medications: PATIENTS PHARMACY STATED THAT THIS MG IS HIGHER THAN THE RECOMMENDED MG FOR A PATIENT HER AGE. SHE STATED HER PHARMACY WAS SUPPOSED TO FAX OVER DOCUMENTS REGARDING THIS MATTER. PATIENT STATED THE PHARMACY HAS NOT GOT A RESPONSE FROM DR. PORTILLO OFFICE SO THE PHARMACY IS SUPPOSED TO CALL     How long have you had these concerns: 1 DAY. PHARMACY CALLED HER.    [facility] - [address] [phone] [facility] [phone]   [phone]

## 2021-01-20 NOTE — TELEPHONE ENCOUNTER
Gladys Susana, RegSched Rep         1/20/21 11:23 AM  Note     OSIRIS CALLED FROM [facility] AND STATED THAT THE GUIDELINES FOR THE MEDICATION CELEXA, FOR THE PATIENTS AGE IS 20 MG AND SHE IS CURRENTLY ON 40 MG. THE PATIENT STATED THAT SHE'S BEEN TAKING THIS MEDICATION FOR A YEAR. [facility] WOULD LIKE A CALL BACK TO KNOW IF YOU ARE WANTING TO STILL KEEP THE PATIENT ON ASHWIN 40 MG OR LOWER TO THE GUIDELINES      CALL BACK  NUMBER: [phone]

## 2021-01-21 NOTE — TELEPHONE ENCOUNTER
Spoke with pharmacy and went over Dr. Padilla recommendations for citalopram. They verbalized understanding and will fill the 40 mg.   I called patient and explained this to her as well. She appreciated the help and had no further questions.

## 2021-01-21 NOTE — TELEPHONE ENCOUNTER
I would like her to keep on the 40 mg dosage for now, we will work on tapering down over the next year but for now they should fill the 40 mg

## 2021-01-21 NOTE — TELEPHONE ENCOUNTER
PATIENT STATED THAT [facility] IS STILL AWAITING FOR A CALL TO FILL PATIENTS CITALOPRAM.    PATIENT STATED SHE HAS BEEN TAKING 40MG FOR OVER A YEAR AND NOT EXPERIENCING ANY PROBLEMS WITH THE DOSAGE.    PATIENT STATED THAT ALL HER OTHER MEDICATIONS ARE ON HOLD UNTIL HER CITALOPRAM IS FILLED.      CALL BACK:  [phone]

## 2021-01-25 RX ORDER — BACLOFEN 20 MG/1
TABLET ORAL
Qty: 120 TABLET | Refills: 0 | OUTPATIENT
Start: 2021-01-25

## 2021-03-04 RX ORDER — BACLOFEN 20 MG/1
TABLET ORAL
Qty: 120 TABLET | Refills: 0 | Status: SHIPPED | OUTPATIENT
Start: 2021-03-04 | End: 2021-03-09

## 2021-03-05 ENCOUNTER — HOSPITAL ENCOUNTER (OUTPATIENT)
Dept: CT IMAGING | Facility: HOSPITAL | Age: 66
Discharge: HOME | End: 2021-03-05
Admitting: FAMILY MEDICINE

## 2021-03-05 DIAGNOSIS — Z87.891 PERSONAL HISTORY OF TOBACCO USE, PRESENTING HAZARDS TO HEALTH: ICD-10-CM

## 2021-03-05 DIAGNOSIS — R91.1 SOLID NODULE OF LUNG 6 MM TO 8 MM IN DIAMETER: ICD-10-CM

## 2021-03-05 PROCEDURE — 71250 CT THORAX DX C-: CPT

## 2021-03-09 RX ORDER — BACLOFEN 20 MG/1
TABLET ORAL
Qty: 120 TABLET | Refills: 0 | Status: SHIPPED | OUTPATIENT
Start: 2021-03-09 | End: 2021-04-29

## 2021-04-06 ENCOUNTER — TELEPHONE (OUTPATIENT)
Dept: FAMILY MEDICINE CLINIC | Facility: CLINIC | Age: 66
End: 2021-04-06

## 2021-04-06 NOTE — TELEPHONE ENCOUNTER
Called and spoke with pt, relayed information that   Dr. Padilla does advise to get COVID vaccine. Verbally understood had no other questions at this time.

## 2021-04-06 NOTE — TELEPHONE ENCOUNTER
Caller: Johnna Contreras    Relationship: Self    Best call back number: [phone]    What is the best time to reach you: ANY TIME    Who are you requesting to speak with (clinical staff, provider,  specific staff member): NURSE      What was the call regarding: COVID VACCINE BASED ON HIS MEDICAL HISTORY SHOULD HE GET IT. SCHEDULED FOR 04/07/2021    Do you require a callback: ASAP

## 2021-04-29 RX ORDER — BACLOFEN 20 MG/1
TABLET ORAL
Qty: 120 TABLET | Refills: 0 | Status: SHIPPED | OUTPATIENT
Start: 2021-04-29 | End: 2021-05-25

## 2021-05-10 RX ORDER — LISINOPRIL AND HYDROCHLOROTHIAZIDE 20; 12.5 MG/1; MG/1
1 TABLET ORAL DAILY
Qty: 90 TABLET | Refills: 3 | Status: SHIPPED | OUTPATIENT
Start: 2021-05-10 | End: 2021-08-14 | Stop reason: SDUPTHER

## 2021-05-10 RX ORDER — CITALOPRAM 40 MG/1
40 TABLET ORAL DAILY
Qty: 90 TABLET | Refills: 3 | Status: SHIPPED | OUTPATIENT
Start: 2021-05-10 | End: 2021-08-14 | Stop reason: SDUPTHER

## 2021-05-25 RX ORDER — BACLOFEN 20 MG/1
TABLET ORAL
Qty: 120 TABLET | Refills: 0 | Status: SHIPPED | OUTPATIENT
Start: 2021-05-25 | End: 2021-06-22

## 2021-06-04 ENCOUNTER — OFFICE VISIT (OUTPATIENT)
Dept: FAMILY MEDICINE CLINIC | Facility: CLINIC | Age: 66
End: 2021-06-04

## 2021-06-04 VITALS
HEART RATE: 66 BPM | OXYGEN SATURATION: 92 % | HEIGHT: 64 IN | WEIGHT: 132 LBS | DIASTOLIC BLOOD PRESSURE: 72 MMHG | BODY MASS INDEX: 22.53 KG/M2 | SYSTOLIC BLOOD PRESSURE: 132 MMHG

## 2021-06-04 DIAGNOSIS — I10 ESSENTIAL HYPERTENSION: Primary | ICD-10-CM

## 2021-06-04 DIAGNOSIS — R73.9 HYPERGLYCEMIA: ICD-10-CM

## 2021-06-04 DIAGNOSIS — Z87.891 PERSONAL HISTORY OF TOBACCO USE, PRESENTING HAZARDS TO HEALTH: ICD-10-CM

## 2021-06-04 DIAGNOSIS — E87.1 HYPONATREMIA: ICD-10-CM

## 2021-06-04 PROCEDURE — 99214 OFFICE O/P EST MOD 30 MIN: CPT | Performed by: FAMILY MEDICINE

## 2021-06-04 RX ORDER — VARENICLINE TARTRATE 1 MG/1
1 TABLET, FILM COATED ORAL 2 TIMES DAILY
Qty: 120 TABLET | Refills: 2 | Status: SHIPPED | OUTPATIENT
Start: 2021-07-04 | End: 2021-09-09 | Stop reason: DRUGHIGH

## 2021-06-04 NOTE — PROGRESS NOTES
Subjective   Johnna Contreras is a 65 y.o. female.     No chief complaint on file.      History of Present Illness     Johnna Contreras presents today for No chief complaint on file.    Johnna is here today for routine follow-up on hypertension. She was last seen in 12/2020. At that time, she was working on smoking cessation with Chantix and over-the-counter nicotine patches. The patient had a low dose CT of the chest completed in 09/2020 that identified a small 7-mm nodule. She was supposed to have a 6-month follow-up CT, which was completed in 03/2021.    The patient reports she is still smoking approximately half a pack a day. She is not taking Chantix but said it did work. The patient reports that the Chantix helped cut her smoking down from 2 packs to a half pack a day. She last took the Chantix several months ago. The patient is not on the nicotine patches because insurance would not cover them. Her insurance as just changed. She does not have Medicaid now. Her goal is to be smoke free by 07/04/2021.     She reports her blood pressure is doing well. She has a watch that checks her heart rate and oxygen level, and does an electrocardiogram as well. She reports her oxygen level is low.     She is taking the oxybutynin at night for her bladder. The patient is unsure whether her urologist, Dr. Rowley, is filling her prescription for oxybutynin or not.     The patient reports she joined another gym since the senior's closed. She is doing 3 times a week. The patient is exercising her legs and arms. She reports she has lost a little over 50 pounds.  The patient is getting around better at home.     This patient is accompanied by their self who contributes to the history of their care.    The following portions of the patient's history were reviewed and updated as appropriate: allergies, current medications, past family history, past medical history, past social history, past surgical history and problem list.    Active  "Ambulatory Problems     Diagnosis Date Noted   • Hyperlipidemia 11/12/2019   • Hypertension 11/12/2019   • Stroke (CMS/HCC) 11/12/2019   • Personal history of tobacco use, presenting hazards to health 12/09/2020   • Solid nodule of lung 6 mm to 8 mm in diameter 12/09/2020     Resolved Ambulatory Problems     Diagnosis Date Noted   • No Resolved Ambulatory Problems     Past Medical History:   Diagnosis Date   • GERD (gastroesophageal reflux disease)    • Headache      Past Surgical History:   Procedure Laterality Date   • DILATATION AND CURETTAGE     • EYE SURGERY     • TONSILLECTOMY     • TUBAL ABDOMINAL LIGATION       Family History   Problem Relation Age of Onset   • Heart attack Mother    • Stroke Mother    • Hypertension Mother    • Hyperlipidemia Mother    • Diabetes Father    • Cancer Paternal Grandmother    • Stroke Paternal Grandmother    • Heart attack Paternal Grandmother      Social History     Socioeconomic History   • Marital status:      Spouse name: Not on file   • Number of children: Not on file   • Years of education: Not on file   • Highest education level: Not on file   Tobacco Use   • Smoking status: Current Every Day Smoker     Packs/day: 0.50   • Smokeless tobacco: Never Used   Substance and Sexual Activity   • Alcohol use: Yes     Comment: rarely    • Drug use: No       Review of Systems  Review of Systems -  General ROS: negative for - chills, fever or night sweats  Cardiovascular ROS: no chest pain or dyspnea on exertion  Gastrointestinal ROS: no abdominal pain, change in bowel habits, or black or bloody stools  Genito-Urinary ROS: no dysuria, trouble voiding, or hematuria    Objective   Blood pressure 132/72, pulse 66, height 162.6 cm (64\"), weight 59.9 kg (132 lb), SpO2 92 %.  Nursing note reviewed  Physical Exam  Const: NAD, A&Ox4, Pleasant, Cooperative  Eyes: EOMI, no conjunctivitis  ENT: No nasal discharge present, neck supple  Cardiac: Regular rate and rhythm, no cyanosis  Resp: " Respiratory rate within normal limits, no increased work of breathing, no audible wheezing or retractions noted  GI: No distention or ascites  MSK: Motor and sensation grossly intact in bilateral upper extremities. [facility] did a new AFO for her left foot.  Neurologic: CN II-XII grossly intact  Psych: Appropriate mood and behavior.  Skin: Warm, dry  Procedures  Assessment/Plan   Problem List Items Addressed This Visit        Cardiac and Vasculature    Hypertension - Primary    Overview     Amlodipine 5mg daily, lisinopril-HCTZ 20-12.5mg daily            Tobacco    Personal history of tobacco use, presenting hazards to health    Overview     Chantix, patches (OTC only per insurance).  Completed CT low dose 9/10/20, small 7 mm noncalcified pleural-based nodule identified within the inferior aspect of the right upper lobe. Six-month follow-up is recommended for stability.         Relevant Medications    varenicline (Chantix Starting Month Pak) 0.5 MG X 11 & 1 MG X 42 tablet    varenicline (Chantix Continuing Month Pak) 1 MG tablet (Start on 7/4/2021)      Other Visit Diagnoses     Hyperglycemia        Relevant Orders    Hemoglobin A1c    Hyponatremia        Relevant Orders    Basic Metabolic Panel          1. Hypertension.  - Continue current medications: Amlodipine 5 mg daily, lisinopril/HCTZ 20/12.5 mg daily. As she continues to lose weight she may be able to come off one of her medications. Her sodium was low back in 08/2020. We will recheck today. If it is still low, I would recommend discontinuing the HCTZ portion of her Prinzide.    2. Current smoker   - Half pack per day currently  - She did well with Chantix, recommended that she have another round of this. Prescription was sent to her pharmacy. She will need to be smoke free by 07/04/2021.    3. Prediabetes.  - Her last A1c in 08/2020 was 6.2 percent. Repeat today.    4. Hyponatremia.  - In 08/2021 she hypochloremic hyponatremia with a sodium of 131  mEq/L and chloride of 94 mmol/L. No signs or symptoms of hyponatremia, suspect that this is chronic. We will recheck this today. May need to discontinue the diuretic as dictated above.    Patient Instructions   1.  Aim to be smoke free by 4th of July    2.  If sodium is too low on your labs today we may need to change your blood pressure medicine      Return in about 3 months (around 9/4/2021) for chantix.    Ambulatory progress note signed and attested to by Ras Padilla D.O.         Scribed for Ras Padilla DO by Shirley Casanova.  06/04/21   10:30 EDT    I have personally performed the services described in this document as scribed by the above individual, and it is both accurate and complete.  Ras Padilla DO  6/18/2021  07:52 EDT

## 2021-06-04 NOTE — PATIENT INSTRUCTIONS
1.  Aim to be smoke free by 4th of July    2.  If sodium is too low on your labs today we may need to change your blood pressure medicine

## 2021-06-05 LAB
BUN SERPL-MCNC: 10 MG/DL (ref 8–23)
BUN/CREAT SERPL: 15.4 (ref 7–25)
CALCIUM SERPL-MCNC: 10.1 MG/DL (ref 8.6–10.5)
CHLORIDE SERPL-SCNC: 100 MMOL/L (ref 98–107)
CO2 SERPL-SCNC: 25.2 MMOL/L (ref 22–29)
CREAT SERPL-MCNC: 0.65 MG/DL (ref 0.57–1)
GLUCOSE SERPL-MCNC: 88 MG/DL (ref 65–99)
HBA1C MFR BLD: 6.1 % (ref 4.8–5.6)
POTASSIUM SERPL-SCNC: 4.5 MMOL/L (ref 3.5–5.2)
SODIUM SERPL-SCNC: 135 MMOL/L (ref 136–145)

## 2021-06-19 DIAGNOSIS — K21.9 GASTROESOPHAGEAL REFLUX DISEASE WITHOUT ESOPHAGITIS: ICD-10-CM

## 2021-06-22 RX ORDER — AMLODIPINE BESYLATE 5 MG/1
TABLET ORAL
Qty: 90 TABLET | Refills: 3 | Status: SHIPPED | OUTPATIENT
Start: 2021-06-22 | End: 2021-08-14 | Stop reason: SDUPTHER

## 2021-06-22 RX ORDER — BACLOFEN 20 MG/1
TABLET ORAL
Qty: 120 TABLET | Refills: 0 | Status: SHIPPED | OUTPATIENT
Start: 2021-06-22 | End: 2021-08-14 | Stop reason: SDUPTHER

## 2021-06-22 RX ORDER — OMEPRAZOLE 40 MG/1
CAPSULE, DELAYED RELEASE ORAL
Qty: 180 CAPSULE | Refills: 3 | Status: SHIPPED | OUTPATIENT
Start: 2021-06-22 | End: 2021-08-14 | Stop reason: SDUPTHER

## 2021-08-12 DIAGNOSIS — K21.9 GASTROESOPHAGEAL REFLUX DISEASE WITHOUT ESOPHAGITIS: ICD-10-CM

## 2021-08-12 RX ORDER — OMEPRAZOLE 40 MG/1
40 CAPSULE, DELAYED RELEASE ORAL 2 TIMES DAILY
Qty: 180 CAPSULE | Refills: 3 | Status: CANCELLED | OUTPATIENT
Start: 2021-08-12

## 2021-08-12 RX ORDER — AMLODIPINE BESYLATE 5 MG/1
5 TABLET ORAL DAILY
Qty: 90 TABLET | Refills: 3 | Status: CANCELLED | OUTPATIENT
Start: 2021-08-12

## 2021-08-12 RX ORDER — CITALOPRAM 40 MG/1
40 TABLET ORAL DAILY
Qty: 90 TABLET | Refills: 3 | Status: CANCELLED | OUTPATIENT
Start: 2021-08-12

## 2021-08-12 NOTE — TELEPHONE ENCOUNTER
Rx Refill Note  Requested Prescriptions      No prescriptions requested or ordered in this encounter      Last office visit with prescribing clinician: 6/4/2021      Next office visit with prescribing clinician: 9/7/2021     Last labs 6/4/2021       Oralia Mesa MA  08/12/21, 14:34 EDT

## 2021-08-14 DIAGNOSIS — K21.9 GASTROESOPHAGEAL REFLUX DISEASE WITHOUT ESOPHAGITIS: ICD-10-CM

## 2021-08-14 NOTE — TELEPHONE ENCOUNTER
Rx Refill Note  Requested Prescriptions     Pending Prescriptions Disp Refills   • baclofen (LIORESAL) 20 MG tablet 360 tablet 0     Sig: Take 1 tablet by mouth 4 (Four) Times a Day.   • lisinopril-hydrochlorothiazide (PRINZIDE,ZESTORETIC) 20-12.5 MG per tablet 90 tablet 3     Sig: Take 1 tablet by mouth Daily.   • ketoconazole (NIZORAL) 2 % shampoo 120 mL 2     Sig: Apply  topically to the appropriate area as directed 1 (One) Time Per Week.   • citalopram (CeleXA) 40 MG tablet 90 tablet 3     Sig: Take 1 tablet by mouth Daily.   • omeprazole (priLOSEC) 40 MG capsule 180 capsule 3     Sig: Take 1 capsule by mouth 2 (Two) Times a Day.   • amLODIPine (NORVASC) 5 MG tablet 90 tablet 3     Sig: Take 1 tablet by mouth Daily.      Last office visit with prescribing clinician: 6/4/2021      Next office visit with prescribing clinician: 9/7/2021     Last labs 6/4/2021       Oralia Mesa MA  08/14/21, 10:03 EDT

## 2021-08-16 RX ORDER — CITALOPRAM 40 MG/1
40 TABLET ORAL DAILY
Qty: 90 TABLET | Refills: 3 | Status: SHIPPED | OUTPATIENT
Start: 2021-08-16 | End: 2022-08-30 | Stop reason: SDUPTHER

## 2021-08-16 RX ORDER — KETOCONAZOLE 20 MG/ML
SHAMPOO TOPICAL WEEKLY
Qty: 120 ML | Refills: 2 | Status: SHIPPED | OUTPATIENT
Start: 2021-08-16 | End: 2022-01-03

## 2021-08-16 RX ORDER — LISINOPRIL AND HYDROCHLOROTHIAZIDE 20; 12.5 MG/1; MG/1
1 TABLET ORAL DAILY
Qty: 90 TABLET | Refills: 3 | Status: SHIPPED | OUTPATIENT
Start: 2021-08-16 | End: 2022-08-30 | Stop reason: SDUPTHER

## 2021-08-16 RX ORDER — BACLOFEN 20 MG/1
20 TABLET ORAL 4 TIMES DAILY
Qty: 360 TABLET | Refills: 0 | Status: SHIPPED | OUTPATIENT
Start: 2021-08-16 | End: 2021-08-31

## 2021-08-16 RX ORDER — OMEPRAZOLE 40 MG/1
40 CAPSULE, DELAYED RELEASE ORAL 2 TIMES DAILY
Qty: 180 CAPSULE | Refills: 3 | Status: SHIPPED | OUTPATIENT
Start: 2021-08-16 | End: 2022-09-20 | Stop reason: SDUPTHER

## 2021-08-16 RX ORDER — AMLODIPINE BESYLATE 5 MG/1
5 TABLET ORAL DAILY
Qty: 90 TABLET | Refills: 3 | Status: SHIPPED | OUTPATIENT
Start: 2021-08-16 | End: 2022-08-30 | Stop reason: SDUPTHER

## 2021-08-31 DIAGNOSIS — J30.89 ALLERGIC RHINITIS DUE TO OTHER ALLERGIC TRIGGER, UNSPECIFIED SEASONALITY: ICD-10-CM

## 2021-08-31 RX ORDER — BACLOFEN 20 MG/1
TABLET ORAL
Qty: 120 TABLET | Refills: 0 | Status: SHIPPED | OUTPATIENT
Start: 2021-08-31 | End: 2021-10-13

## 2021-08-31 RX ORDER — FLUTICASONE PROPIONATE 50 MCG
SPRAY, SUSPENSION (ML) NASAL
Qty: 32 G | Refills: 0 | Status: SHIPPED | OUTPATIENT
Start: 2021-08-31 | End: 2021-10-13

## 2021-08-31 NOTE — TELEPHONE ENCOUNTER
Rx Refill Note  Requested Prescriptions     Pending Prescriptions Disp Refills   • fluticasone (FLONASE) 50 MCG/ACT nasal spray [Pharmacy Med Name: FLUTICASONE PROPIONATE 50 MCG/ACT Suspension] 32 g      Sig: USE 1 SPRAY IN EACH NOSTRIL EVERY DAY AS DIRECTED   • baclofen (LIORESAL) 20 MG tablet [Pharmacy Med Name: BACLOFEN 20 MG Tablet] 120 tablet      Sig: TAKE 1 TABLET FOUR TIMES DAILY      Last office visit with prescribing clinician: 6/4/2021      Next office visit with prescribing clinician: 9/7/2021            Adriane Estrada MA  08/31/21, 14:17 EDT

## 2021-09-09 ENCOUNTER — OFFICE VISIT (OUTPATIENT)
Dept: FAMILY MEDICINE CLINIC | Facility: CLINIC | Age: 66
End: 2021-09-09

## 2021-09-09 VITALS
BODY MASS INDEX: 23.7 KG/M2 | OXYGEN SATURATION: 93 % | SYSTOLIC BLOOD PRESSURE: 116 MMHG | RESPIRATION RATE: 16 BRPM | HEIGHT: 64 IN | DIASTOLIC BLOOD PRESSURE: 80 MMHG | TEMPERATURE: 97.3 F | WEIGHT: 138.8 LBS | HEART RATE: 62 BPM

## 2021-09-09 DIAGNOSIS — Z87.891 PERSONAL HISTORY OF TOBACCO USE, PRESENTING HAZARDS TO HEALTH: ICD-10-CM

## 2021-09-09 DIAGNOSIS — Z78.0 ASYMPTOMATIC AGE-RELATED POSTMENOPAUSAL STATE: ICD-10-CM

## 2021-09-09 DIAGNOSIS — Z12.31 BREAST CANCER SCREENING BY MAMMOGRAM: ICD-10-CM

## 2021-09-09 DIAGNOSIS — I10 ESSENTIAL HYPERTENSION: Primary | ICD-10-CM

## 2021-09-09 PROCEDURE — 99214 OFFICE O/P EST MOD 30 MIN: CPT | Performed by: FAMILY MEDICINE

## 2021-09-09 RX ORDER — OXYBUTYNIN CHLORIDE 5 MG/1
5 TABLET ORAL NIGHTLY
Qty: 30 TABLET | Refills: 0 | Status: SHIPPED | OUTPATIENT
Start: 2021-09-09 | End: 2021-10-13

## 2021-09-09 RX ORDER — VARENICLINE TARTRATE 1 MG/1
1 TABLET, FILM COATED ORAL 2 TIMES DAILY
Qty: 120 TABLET | Refills: 2 | Status: SHIPPED | OUTPATIENT
Start: 2021-09-09 | End: 2022-01-27

## 2021-09-09 NOTE — PROGRESS NOTES
Subjective   Johnna Contreras is a 66 y.o. female.     Chief Complaint   Patient presents with   • Hypertension   • Med Refill     chantix       History of Present Illness     Johnna Contreras presents today for   Chief Complaint   Patient presents with   • Hypertension   • Med Refill     chantix     Johnna is present today for a follow-up on hypertension, and smoking cessation.     She reports she is smoking approximately less than one-half pack of cigarettes per day. She states that she took all of the Chantix. Her quit date is 10/01/2021. Her blood pressure looks great today. She reports she has eaten today. She does not remember when her last mammogram was performed. She is also due for her bone density screening. She is due for her pneumonia, and shingles vaccine.    She is still seeing Dr. George. She takes omeprazole twice daily.        This patient is accompanied by their self who contributes to the history of their care.    The following portions of the patient's history were reviewed and updated as appropriate: allergies, current medications, past family history, past medical history, past social history, past surgical history and problem list.    Active Ambulatory Problems     Diagnosis Date Noted   • Hyperlipidemia 11/12/2019   • Hypertension 11/12/2019   • Stroke (CMS/HCC) 11/12/2019   • Personal history of tobacco use, presenting hazards to health 12/09/2020   • Solid nodule of lung 6 mm to 8 mm in diameter 12/09/2020     Resolved Ambulatory Problems     Diagnosis Date Noted   • No Resolved Ambulatory Problems     Past Medical History:   Diagnosis Date   • GERD (gastroesophageal reflux disease)    • Headache      Past Surgical History:   Procedure Laterality Date   • DILATATION AND CURETTAGE     • EYE SURGERY     • TONSILLECTOMY     • TUBAL ABDOMINAL LIGATION       Family History   Problem Relation Age of Onset   • Heart attack Mother    • Stroke Mother    • Hypertension Mother    • Hyperlipidemia Mother  "   • Diabetes Father    • Cancer Paternal Grandmother    • Stroke Paternal Grandmother    • Heart attack Paternal Grandmother      Social History     Socioeconomic History   • Marital status:      Spouse name: Not on file   • Number of children: Not on file   • Years of education: Not on file   • Highest education level: Not on file   Tobacco Use   • Smoking status: Current Every Day Smoker     Packs/day: 0.50   • Smokeless tobacco: Never Used   Vaping Use   • Vaping Use: Never used   Substance and Sexual Activity   • Alcohol use: Yes     Comment: rarely    • Drug use: No   • Sexual activity: Defer       Review of Systems  See HPI    Objective   Blood pressure 116/80, pulse 62, temperature 97.3 °F (36.3 °C), resp. rate 16, height 162.6 cm (64.02\"), weight 63 kg (138 lb 12.8 oz), SpO2 93 %.  Nursing note reviewed  Physical Exam  Const: NAD, A&Ox4, Pleasant, Cooperative  Eyes: EOMI, no conjunctivitis  ENT: No nasal discharge present, neck supple  Cardiac: Regular rate and rhythm, no cyanosis  Resp: Respiratory rate within normal limits, no increased work of breathing, no audible wheezing or retractions noted  GI: No distention or ascites  MSK: Motor and sensation grossly intact in bilateral upper extremities  Neurologic: CN II-XII grossly intact  Psych: Appropriate mood and behavior.  Skin: Warm, dry  Procedures  Assessment/Plan   Problem List Items Addressed This Visit        Cardiac and Vasculature    Hypertension - Primary    Overview     Amlodipine 5mg daily, lisinopril-HCTZ 20-12.5mg daily            Tobacco    Personal history of tobacco use, presenting hazards to health    Overview     Chantix, patches (OTC only per insurance).  Completed CT low dose 9/10/20, small 7 mm noncalcified pleural-based nodule identified within the inferior aspect of the right upper lobe. Six-month follow-up is recommended for stability.         Relevant Medications    varenicline (Chantix Continuing Month Baljeet) 1 MG tablet    "   Other Visit Diagnoses     Asymptomatic age-related postmenopausal state        Relevant Orders    DEXA Bone Density Axial    Breast cancer screening by mammogram        Relevant Orders    Mammo Screening Digital Tomosynthesis Bilateral With CAD          1. Hypertension  - Well-controlled on current medication regimen.  - Continue amlodipine 5 mg daily, and lisinopril/HCTZ 20/12 mg    2. Tobacco abuse  - Refill Chantix  - Quit date set for 10/01/2021    3. Health maintenance  - She will return for fasting labs  - I will order a mammogram.  - I will order a bone density screening.   - She will have her pneumonia, and shingles vaccine at her pharmacy.  - Refill for oxybutynin sent.  - I will refill her omeprazole today, and she will have future refills sent through Dr. George.        Patient Instructions   1.  Quit date set for October 1st    2.  Obtain Shingrix and Pneumovax from your pharmacy      Return in about 3 months (around 12/9/2021) for Medicare Wellness.    Ambulatory progress note signed and attested to by Ras Padilla D.O.           Transcribed from ambient dictation for Ras Padilla DO by Roz Butler.  09/09/21   14:24 EDT    I have personally performed the services described in this document as transcribed by the above individual, and it is both accurate and complete.  Ras Padilla DO  9/21/2021  07:44 EDT

## 2021-10-12 DIAGNOSIS — J30.89 ALLERGIC RHINITIS DUE TO OTHER ALLERGIC TRIGGER, UNSPECIFIED SEASONALITY: ICD-10-CM

## 2021-10-13 RX ORDER — OXYBUTYNIN CHLORIDE 5 MG/1
5 TABLET ORAL NIGHTLY
Qty: 90 TABLET | Refills: 1 | Status: SHIPPED | OUTPATIENT
Start: 2021-10-13 | End: 2022-03-11

## 2021-10-13 RX ORDER — FLUTICASONE PROPIONATE 50 MCG
SPRAY, SUSPENSION (ML) NASAL
Qty: 32 G | Refills: 0 | Status: SHIPPED | OUTPATIENT
Start: 2021-10-13 | End: 2022-08-30 | Stop reason: SDUPTHER

## 2021-10-13 RX ORDER — BACLOFEN 20 MG/1
TABLET ORAL
Qty: 360 TABLET | Refills: 0 | Status: SHIPPED | OUTPATIENT
Start: 2021-10-13 | End: 2021-12-09

## 2021-10-13 NOTE — TELEPHONE ENCOUNTER
Rx Refill Note  Requested Prescriptions     Pending Prescriptions Disp Refills   • oxybutynin (DITROPAN) 5 MG tablet [Pharmacy Med Name: OXYBUTYNIN CHLORIDE 5 MG Tablet] 30 tablet 0     Sig: TAKE 1 TABLET BY MOUTH EVERY NIGHT.   • baclofen (LIORESAL) 20 MG tablet [Pharmacy Med Name: BACLOFEN 20 MG Tablet] 360 tablet      Sig: TAKE 1 TABLET FOUR TIMES DAILY   • fluticasone (FLONASE) 50 MCG/ACT nasal spray [Pharmacy Med Name: FLUTICASONE PROPIONATE 50 MCG/ACT Suspension] 32 g 0     Sig: USE 1 SPRAY IN EACH NOSTRIL EVERY DAY AS DIRECTED      Last office visit with prescribing clinician: 9/9/2021      Next office visit with prescribing clinician: 1/25/2022          {TIP  Is Refill Pharmacy correct?:23}  Oralia Mesa MA  10/13/21, 09:01 EDT

## 2021-12-09 RX ORDER — BACLOFEN 20 MG/1
TABLET ORAL
Qty: 120 TABLET | Refills: 2 | Status: SHIPPED | OUTPATIENT
Start: 2021-12-09 | End: 2022-01-27

## 2021-12-09 NOTE — TELEPHONE ENCOUNTER
Rx Refill Note  Requested Prescriptions     Pending Prescriptions Disp Refills   • baclofen (LIORESAL) 20 MG tablet [Pharmacy Med Name: BACLOFEN 20 MG Tablet] 120 tablet      Sig: TAKE 1 TABLET FOUR TIMES DAILY      Last office visit with prescribing clinician: 9/9/2021      Next office visit with prescribing clinician: 1/25/2022            SOHA ANTONY MA  12/09/21, 13:30 EST

## 2021-12-14 ENCOUNTER — APPOINTMENT (OUTPATIENT)
Dept: OTHER | Facility: HOSPITAL | Age: 66
End: 2021-12-14

## 2021-12-14 ENCOUNTER — APPOINTMENT (OUTPATIENT)
Dept: BONE DENSITY | Facility: HOSPITAL | Age: 66
End: 2021-12-14

## 2021-12-14 ENCOUNTER — APPOINTMENT (OUTPATIENT)
Dept: MAMMOGRAPHY | Facility: HOSPITAL | Age: 66
End: 2021-12-14

## 2021-12-14 DIAGNOSIS — Z12.31 BREAST CANCER SCREENING BY MAMMOGRAM: ICD-10-CM

## 2022-01-03 RX ORDER — KETOCONAZOLE 20 MG/ML
SHAMPOO TOPICAL
Qty: 120 ML | Refills: 2 | Status: SHIPPED | OUTPATIENT
Start: 2022-01-03 | End: 2022-03-14

## 2022-01-03 NOTE — TELEPHONE ENCOUNTER
Rx Refill Note  Requested Prescriptions     Pending Prescriptions Disp Refills   • ketoconazole (NIZORAL) 2 % shampoo [Pharmacy Med Name: KETOCONAZOLE 2 % Shampoo] 120 mL 2     Sig: APPLY  TOPICALLY TO THE APPROPRIATE AREA AS DIRECTED 1 TIME PER WEEK.      Last office visit with prescribing clinician: 9/9/2021      Next office visit with prescribing clinician: 1/25/2022            Caroline Marley MA  01/03/22, 10:49 EST

## 2022-01-27 ENCOUNTER — OFFICE VISIT (OUTPATIENT)
Dept: FAMILY MEDICINE CLINIC | Facility: CLINIC | Age: 67
End: 2022-01-27

## 2022-01-27 ENCOUNTER — LAB (OUTPATIENT)
Dept: LAB | Facility: HOSPITAL | Age: 67
End: 2022-01-27

## 2022-01-27 ENCOUNTER — APPOINTMENT (OUTPATIENT)
Dept: CARDIOLOGY | Facility: HOSPITAL | Age: 67
End: 2022-01-27

## 2022-01-27 VITALS
WEIGHT: 142.4 LBS | HEIGHT: 64 IN | SYSTOLIC BLOOD PRESSURE: 130 MMHG | DIASTOLIC BLOOD PRESSURE: 92 MMHG | BODY MASS INDEX: 24.31 KG/M2 | OXYGEN SATURATION: 94 % | HEART RATE: 70 BPM | RESPIRATION RATE: 16 BRPM

## 2022-01-27 DIAGNOSIS — R60.0 BILATERAL LOWER EXTREMITY EDEMA: ICD-10-CM

## 2022-01-27 DIAGNOSIS — M79.89 LEFT LEG SWELLING: ICD-10-CM

## 2022-01-27 DIAGNOSIS — R73.9 HYPERGLYCEMIA: ICD-10-CM

## 2022-01-27 DIAGNOSIS — Z13.0 SCREENING FOR DEFICIENCY ANEMIA: ICD-10-CM

## 2022-01-27 DIAGNOSIS — Z13.220 SCREENING FOR HYPERLIPIDEMIA: ICD-10-CM

## 2022-01-27 DIAGNOSIS — Z00.00 MEDICARE ANNUAL WELLNESS VISIT, SUBSEQUENT: Primary | ICD-10-CM

## 2022-01-27 DIAGNOSIS — Z87.891 PERSONAL HISTORY OF TOBACCO USE, PRESENTING HAZARDS TO HEALTH: ICD-10-CM

## 2022-01-27 DIAGNOSIS — Z23 IMMUNIZATION DUE: ICD-10-CM

## 2022-01-27 DIAGNOSIS — Z00.00 PREVENTATIVE HEALTH CARE: ICD-10-CM

## 2022-01-27 DIAGNOSIS — Z13.29 SCREENING FOR ENDOCRINE DISORDER: ICD-10-CM

## 2022-01-27 LAB
ALBUMIN SERPL-MCNC: 4 G/DL (ref 3.5–5.2)
ALBUMIN/GLOB SERPL: 1.4 G/DL
ALP SERPL-CCNC: 98 U/L (ref 39–117)
ALT SERPL-CCNC: 17 U/L (ref 1–33)
APPEARANCE UR: CLEAR
AST SERPL-CCNC: 16 U/L (ref 1–32)
BACTERIA #/AREA URNS HPF: NORMAL /HPF
BILIRUB SERPL-MCNC: 0.2 MG/DL (ref 0–1.2)
BILIRUB UR QL STRIP: NEGATIVE
BUN SERPL-MCNC: 12 MG/DL (ref 8–23)
BUN/CREAT SERPL: 16.7 (ref 7–25)
CALCIUM SERPL-MCNC: 9.6 MG/DL (ref 8.6–10.5)
CASTS URNS MICRO: NORMAL
CHLORIDE SERPL-SCNC: 100 MMOL/L (ref 98–107)
CHOLEST SERPL-MCNC: 183 MG/DL (ref 0–200)
CO2 SERPL-SCNC: 24 MMOL/L (ref 22–29)
COLOR UR: YELLOW
CREAT SERPL-MCNC: 0.72 MG/DL (ref 0.57–1)
D DIMER PPP FEU-MCNC: 0.39 MCGFEU/ML (ref 0–0.56)
EPI CELLS #/AREA URNS HPF: NORMAL /HPF
ERYTHROCYTE [DISTWIDTH] IN BLOOD BY AUTOMATED COUNT: 12.9 % (ref 12.3–15.4)
GLOBULIN SER CALC-MCNC: 2.9 GM/DL
GLUCOSE SERPL-MCNC: 94 MG/DL (ref 65–99)
GLUCOSE UR QL STRIP: NEGATIVE
HBA1C MFR BLD: 6.1 % (ref 4.8–5.6)
HCT VFR BLD AUTO: 38.6 % (ref 34–46.6)
HDLC SERPL-MCNC: 67 MG/DL (ref 40–60)
HGB BLD-MCNC: 13.1 G/DL (ref 12–15.9)
HGB UR QL STRIP: NEGATIVE
KETONES UR QL STRIP: NEGATIVE
LDLC SERPL CALC-MCNC: 106 MG/DL (ref 0–100)
LEUKOCYTE ESTERASE UR QL STRIP: ABNORMAL
MCH RBC QN AUTO: 29.4 PG (ref 26.6–33)
MCHC RBC AUTO-ENTMCNC: 33.9 G/DL (ref 31.5–35.7)
MCV RBC AUTO: 86.7 FL (ref 79–97)
NITRITE UR QL STRIP: NEGATIVE
PH UR STRIP: 7 [PH] (ref 5–8)
PLATELET # BLD AUTO: 282 10*3/MM3 (ref 140–450)
POTASSIUM SERPL-SCNC: 4 MMOL/L (ref 3.5–5.2)
PROT SERPL-MCNC: 6.9 G/DL (ref 6–8.5)
PROT UR QL STRIP: NEGATIVE
RBC # BLD AUTO: 4.45 10*6/MM3 (ref 3.77–5.28)
RBC #/AREA URNS HPF: NORMAL /HPF
SODIUM SERPL-SCNC: 134 MMOL/L (ref 136–145)
SP GR UR STRIP: 1.01 (ref 1–1.03)
TRIGL SERPL-MCNC: 51 MG/DL (ref 0–150)
TSH SERPL-ACNC: 1.65 UIU/ML (ref 0.27–4.2)
UROBILINOGEN UR STRIP-MCNC: ABNORMAL MG/DL
VLDLC SERPL CALC-MCNC: 10 MG/DL (ref 5–40)
WBC # BLD AUTO: 6.51 10*3/MM3 (ref 3.4–10.8)
WBC #/AREA URNS HPF: NORMAL /HPF

## 2022-01-27 PROCEDURE — 96160 PT-FOCUSED HLTH RISK ASSMT: CPT | Performed by: FAMILY MEDICINE

## 2022-01-27 PROCEDURE — G0439 PPPS, SUBSEQ VISIT: HCPCS | Performed by: FAMILY MEDICINE

## 2022-01-27 PROCEDURE — 1159F MED LIST DOCD IN RCRD: CPT | Performed by: FAMILY MEDICINE

## 2022-01-27 PROCEDURE — G0008 ADMIN INFLUENZA VIRUS VAC: HCPCS | Performed by: FAMILY MEDICINE

## 2022-01-27 PROCEDURE — 99397 PER PM REEVAL EST PAT 65+ YR: CPT | Performed by: FAMILY MEDICINE

## 2022-01-27 PROCEDURE — 90662 IIV NO PRSV INCREASED AG IM: CPT | Performed by: FAMILY MEDICINE

## 2022-01-27 PROCEDURE — 1170F FXNL STATUS ASSESSED: CPT | Performed by: FAMILY MEDICINE

## 2022-01-27 PROCEDURE — 0001A COVID-19 (PFIZER): CPT | Performed by: FAMILY MEDICINE

## 2022-01-27 PROCEDURE — 1125F AMNT PAIN NOTED PAIN PRSNT: CPT | Performed by: FAMILY MEDICINE

## 2022-01-27 PROCEDURE — 91300 COVID-19 (PFIZER): CPT | Performed by: FAMILY MEDICINE

## 2022-01-27 RX ORDER — ALBUTEROL SULFATE 90 UG/1
2 AEROSOL, METERED RESPIRATORY (INHALATION) DAILY
Qty: 18 G | Refills: 11 | Status: SHIPPED | OUTPATIENT
Start: 2022-01-27 | End: 2022-08-30 | Stop reason: SDUPTHER

## 2022-01-27 RX ORDER — BACLOFEN 20 MG/1
TABLET ORAL
Qty: 360 TABLET | Refills: 1 | Status: SHIPPED | OUTPATIENT
Start: 2022-01-27 | End: 2022-08-30 | Stop reason: SDUPTHER

## 2022-01-27 NOTE — TELEPHONE ENCOUNTER
Rx Refill Note  Requested Prescriptions     Pending Prescriptions Disp Refills   • baclofen (LIORESAL) 20 MG tablet [Pharmacy Med Name: BACLOFEN 20 MG Tablet] 360 tablet      Sig: TAKE 1 TABLET FOUR TIMES DAILY      Last office visit with prescribing clinician: 9/9/2021      Next office visit with prescribing clinician: 1/27/2022     Gill Coleman MA  01/27/22, 09:12 EST       Last fill: 12/9/2021

## 2022-01-28 ENCOUNTER — HOSPITAL ENCOUNTER (OUTPATIENT)
Dept: CARDIOLOGY | Facility: HOSPITAL | Age: 67
Discharge: HOME | End: 2022-01-28
Admitting: FAMILY MEDICINE

## 2022-01-28 VITALS — WEIGHT: 142 LBS | BODY MASS INDEX: 24.24 KG/M2 | HEIGHT: 64 IN

## 2022-01-28 DIAGNOSIS — M79.89 LEFT LEG SWELLING: ICD-10-CM

## 2022-01-28 LAB
BH CV ECHO MEAS - BSA(HAYCOCK): 1.7 M^2
BH CV ECHO MEAS - BSA: 1.7 M^2
BH CV ECHO MEAS - BZI_BMI: 24.4 KILOGRAMS/M^2
BH CV ECHO MEAS - BZI_METRIC_HEIGHT: 162.6 CM
BH CV ECHO MEAS - BZI_METRIC_WEIGHT: 64.4 KG
BH CV LOWER VASCULAR LEFT COMMON FEMORAL AUGMENT: NORMAL
BH CV LOWER VASCULAR LEFT COMMON FEMORAL COMPRESS: NORMAL
BH CV LOWER VASCULAR LEFT COMMON FEMORAL PHASIC: NORMAL
BH CV LOWER VASCULAR LEFT COMMON FEMORAL SPONT: NORMAL
BH CV LOWER VASCULAR LEFT DISTAL FEMORAL AUGMENT: NORMAL
BH CV LOWER VASCULAR LEFT DISTAL FEMORAL COMPRESS: NORMAL
BH CV LOWER VASCULAR LEFT DISTAL FEMORAL PHASIC: NORMAL
BH CV LOWER VASCULAR LEFT DISTAL FEMORAL SPONT: NORMAL
BH CV LOWER VASCULAR LEFT GASTRONEMIUS COMPRESS: NORMAL
BH CV LOWER VASCULAR LEFT GREATER SAPH AK COMPRESS: NORMAL
BH CV LOWER VASCULAR LEFT GREATER SAPH BK COMPRESS: NORMAL
BH CV LOWER VASCULAR LEFT LESSER SAPH COMPRESS: NORMAL
BH CV LOWER VASCULAR LEFT MID FEMORAL AUGMENT: NORMAL
BH CV LOWER VASCULAR LEFT MID FEMORAL COMPRESS: NORMAL
BH CV LOWER VASCULAR LEFT MID FEMORAL PHASIC: NORMAL
BH CV LOWER VASCULAR LEFT MID FEMORAL SPONT: NORMAL
BH CV LOWER VASCULAR LEFT PERONEAL AUGMENT: NORMAL
BH CV LOWER VASCULAR LEFT PERONEAL COMPRESS: NORMAL
BH CV LOWER VASCULAR LEFT POPLITEAL AUGMENT: NORMAL
BH CV LOWER VASCULAR LEFT POPLITEAL COMPRESS: NORMAL
BH CV LOWER VASCULAR LEFT POPLITEAL PHASIC: NORMAL
BH CV LOWER VASCULAR LEFT POPLITEAL SPONT: NORMAL
BH CV LOWER VASCULAR LEFT POSTERIOR TIBIAL AUGMENT: NORMAL
BH CV LOWER VASCULAR LEFT POSTERIOR TIBIAL COMPRESS: NORMAL
BH CV LOWER VASCULAR LEFT PROFUNDA FEMORAL AUGMENT: NORMAL
BH CV LOWER VASCULAR LEFT PROFUNDA FEMORAL PHASIC: NORMAL
BH CV LOWER VASCULAR LEFT PROFUNDA FEMORAL SPONT: NORMAL
BH CV LOWER VASCULAR LEFT PROXIMAL FEMORAL AUGMENT: NORMAL
BH CV LOWER VASCULAR LEFT PROXIMAL FEMORAL COMPRESS: NORMAL
BH CV LOWER VASCULAR LEFT PROXIMAL FEMORAL PHASIC: NORMAL
BH CV LOWER VASCULAR LEFT PROXIMAL FEMORAL SPONT: NORMAL
BH CV LOWER VASCULAR LEFT SAPHENOFEMORAL JUNCTION AUGMENT: NORMAL
BH CV LOWER VASCULAR LEFT SAPHENOFEMORAL JUNCTION COMPRESS: NORMAL
BH CV LOWER VASCULAR LEFT SAPHENOFEMORAL JUNCTION PHASIC: NORMAL
BH CV LOWER VASCULAR LEFT SAPHENOFEMORAL JUNCTION SPONT: NORMAL
BH CV LOWER VASCULAR RIGHT COMMON FEMORAL AUGMENT: NORMAL
BH CV LOWER VASCULAR RIGHT COMMON FEMORAL COMPRESS: NORMAL
BH CV LOWER VASCULAR RIGHT COMMON FEMORAL PHASIC: NORMAL
BH CV LOWER VASCULAR RIGHT COMMON FEMORAL SPONT: NORMAL
BH CV LOWER VASCULAR RIGHT SAPHENOFEMORAL JUNCTION AUGMENT: NORMAL
BH CV LOWER VASCULAR RIGHT SAPHENOFEMORAL JUNCTION COMPRESS: NORMAL
BH CV LOWER VASCULAR RIGHT SAPHENOFEMORAL JUNCTION PHASIC: NORMAL
BH CV LOWER VASCULAR RIGHT SAPHENOFEMORAL JUNCTION SPONT: NORMAL

## 2022-01-28 PROCEDURE — 93971 EXTREMITY STUDY: CPT | Performed by: INTERNAL MEDICINE

## 2022-01-28 PROCEDURE — 93971 EXTREMITY STUDY: CPT

## 2022-02-01 ENCOUNTER — CLINICAL SUPPORT NO REQUIREMENTS (OUTPATIENT)
Dept: PREADMISSION TESTING | Facility: HOSPITAL | Age: 67
End: 2022-02-01

## 2022-02-01 DIAGNOSIS — Z87.891 PERSONAL HISTORY OF TOBACCO USE, PRESENTING HAZARDS TO HEALTH: ICD-10-CM

## 2022-02-01 DIAGNOSIS — R60.0 BILATERAL LOWER EXTREMITY EDEMA: ICD-10-CM

## 2022-02-01 LAB — SARS-COV-2 RNA PNL SPEC NAA+PROBE: NOT DETECTED

## 2022-02-01 PROCEDURE — U0004 COV-19 TEST NON-CDC HGH THRU: HCPCS | Performed by: FAMILY MEDICINE

## 2022-02-03 ENCOUNTER — HOSPITAL ENCOUNTER (OUTPATIENT)
Dept: PULMONOLOGY | Facility: HOSPITAL | Age: 67
Discharge: HOME | End: 2022-02-03
Admitting: FAMILY MEDICINE

## 2022-02-03 DIAGNOSIS — R60.0 BILATERAL LOWER EXTREMITY EDEMA: ICD-10-CM

## 2022-02-03 DIAGNOSIS — Z87.891 PERSONAL HISTORY OF TOBACCO USE, PRESENTING HAZARDS TO HEALTH: ICD-10-CM

## 2022-02-03 PROCEDURE — 94060 EVALUATION OF WHEEZING: CPT | Performed by: INTERNAL MEDICINE

## 2022-02-03 PROCEDURE — 94060 EVALUATION OF WHEEZING: CPT

## 2022-02-03 PROCEDURE — 94640 AIRWAY INHALATION TREATMENT: CPT

## 2022-02-03 RX ORDER — ALBUTEROL SULFATE 2.5 MG/3ML
2.5 SOLUTION RESPIRATORY (INHALATION) ONCE
Status: COMPLETED | OUTPATIENT
Start: 2022-02-03 | End: 2022-02-03

## 2022-02-03 RX ADMIN — ALBUTEROL SULFATE 2.5 MG: 2.5 SOLUTION RESPIRATORY (INHALATION) at 12:13

## 2022-03-01 ENCOUNTER — OFFICE VISIT (OUTPATIENT)
Dept: FAMILY MEDICINE CLINIC | Facility: CLINIC | Age: 67
End: 2022-03-01

## 2022-03-01 VITALS
OXYGEN SATURATION: 92 % | SYSTOLIC BLOOD PRESSURE: 124 MMHG | DIASTOLIC BLOOD PRESSURE: 70 MMHG | RESPIRATION RATE: 16 BRPM | HEIGHT: 64 IN | BODY MASS INDEX: 24.36 KG/M2 | HEART RATE: 79 BPM

## 2022-03-01 DIAGNOSIS — M79.601 RIGHT ARM PAIN: Primary | ICD-10-CM

## 2022-03-01 PROCEDURE — 99213 OFFICE O/P EST LOW 20 MIN: CPT | Performed by: FAMILY MEDICINE

## 2022-03-01 NOTE — PROGRESS NOTES
Established Patient Office Visit      Patient Name: Johnna Contreras  : 1955   MRN: [id number]   Care Team: Patient Care Team:  Ras Padilla DO as PCP - General (Family Medicine)    Chief Complaint:    Chief Complaint   Patient presents with   • right arm pain     thought it may be from booster shot given  but now is not sure       History of Present Illness: Johnna Contreras is a 66 y.o. female who is here today for chief complaint.    HPI    The patient has consented to being recorded using MAR[facility].    The patient reports that her arm started getting sore right after the COVID-19 booster vaccine. She reports that her motion is the most painful when she raises her arm up. She states that she had been doing the pulleys at the gym today and she could not get her hands all the way up. She reports that the pain feels like it is deep in the muscle of her right upper extremity. She states that it just makes it more difficult to do her exercises.    She reports that she had tests done and inquires about the results.    This patient is accompanied by their self who contributes to the history of their care.    The following portions of the patient's history were reviewed and updated as appropriate: allergies, current medications, past family history, past medical history, past social history, past surgical history and problem list.    Subjective      Review of Systems:   Review of Systems - See HPI    Past Medical History:   Past Medical History:   Diagnosis Date   • GERD (gastroesophageal reflux disease)    • Headache    • Hyperlipidemia    • Hypertension    • Stroke (HCC)        Past Surgical History:   Past Surgical History:   Procedure Laterality Date   • DILATATION AND CURETTAGE     • EYE SURGERY     • TONSILLECTOMY     • TUBAL ABDOMINAL LIGATION         Family History:   Family History   Problem Relation Age of Onset   • Heart attack Mother    • Stroke Mother    • Hypertension Mother    • Hyperlipidemia  Mother    • Diabetes Father    • Cancer Paternal Grandmother    • Stroke Paternal Grandmother    • Heart attack Paternal Grandmother        Social History:   Social History     Socioeconomic History   • Marital status:    Tobacco Use   • Smoking status: Current Every Day Smoker     Packs/day: 0.50     Types: Cigarettes   • Smokeless tobacco: Never Used   Vaping Use   • Vaping Use: Never used   Substance and Sexual Activity   • Alcohol use: Yes     Comment: rarely    • Drug use: No   • Sexual activity: Defer       Tobacco History:   Social History     Tobacco Use   Smoking Status Current Every Day Smoker   • Packs/day: 0.50   • Types: Cigarettes   Smokeless Tobacco Never Used       Medications:     Current Outpatient Medications:   •  albuterol sulfate  (90 Base) MCG/ACT inhaler, Inhale 2 puffs Daily., Disp: 18 g, Rfl: 11  •  amLODIPine (NORVASC) 5 MG tablet, Take 1 tablet by mouth Daily., Disp: 90 tablet, Rfl: 3  •  aspirin 81 MG tablet, Take 1 tablet by mouth Daily., Disp: , Rfl:   •  baclofen (LIORESAL) 20 MG tablet, TAKE 1 TABLET FOUR TIMES DAILY, Disp: 360 tablet, Rfl: 1  •  citalopram (CeleXA) 40 MG tablet, Take 1 tablet by mouth Daily., Disp: 90 tablet, Rfl: 3  •  Fexofenadine HCl (ALLERGY 24-HR PO), Take  by mouth., Disp: , Rfl:   •  fluticasone (FLONASE) 50 MCG/ACT nasal spray, USE 1 SPRAY IN EACH NOSTRIL EVERY DAY AS DIRECTED, Disp: 32 g, Rfl: 0  •  hydrocortisone (ANUSOL-HC) 2.5 % rectal cream, Insert  into the rectum 2 (Two) Times a Day., Disp: , Rfl:   •  ketoconazole (NIZORAL) 2 % shampoo, APPLY  TOPICALLY TO THE APPROPRIATE AREA AS DIRECTED 1 TIME PER WEEK., Disp: 120 mL, Rfl: 2  •  lisinopril-hydrochlorothiazide (PRINZIDE,ZESTORETIC) 20-12.5 MG per tablet, Take 1 tablet by mouth Daily., Disp: 90 tablet, Rfl: 3  •  omeprazole (priLOSEC) 40 MG capsule, Take 1 capsule by mouth 2 (Two) Times a Day., Disp: 180 capsule, Rfl: 3  •  oxybutynin (DITROPAN) 5 MG tablet, TAKE 1 TABLET BY MOUTH EVERY  "NIGHT., Disp: 90 tablet, Rfl: 1    Allergies:   No Known Allergies    Objective   Objective     Physical Exam:  Vital Signs:   Vitals:    03/01/22 0934   BP: 124/70   Pulse: 79   Resp: 16   SpO2: 92%   Weight: Comment: unable to stand for weight   Height: 162.6 cm (64.02\")     Body mass index is 24.36 kg/m².     Physical Exam  Nursing note reviewed  Const: NAD, A&Ox4, Pleasant, Cooperative  Eyes: EOMI, no conjunctivitis  ENT: No nasal discharge present, neck supple  Cardiac: Regular rate and rhythm, no cyanosis  Resp: Respiratory rate within normal limits, no increased work of breathing, no audible wheezing or retractions noted  GI: No distention or ascites  MSK: Motor and sensation grossly intact in bilateral upper extremities  Neurologic: CN II-XII grossly intact  Psych: Appropriate mood and behavior.  Skin: Warm, dry  Procedures/Radiology     Procedures  No radiology results for the last 7 days     Assessment/Plan   Assessment / Plan      Assessment/Plan:   Problems Addressed This Visit  Diagnoses and all orders for this visit:    1. Right arm pain (Primary)  Comments:  I advised the patient to give it 1 week off at the gym and then contact me to report how she is doing.       Problem List Items Addressed This Visit     None      Visit Diagnoses     Right arm pain    -  Primary    I advised the patient to give it 1 week off at the gym and then contact me to report how she is doing.           There are no Patient Instructions on file for this visit.    Follow Up:   No follow-ups on file.      DO EBONY JacksonE [address]  [facility]  [address] [facility] [address]  Fax [phone]  Phone [phone]      Transcribed from ambient dictation for Ras Padilla DO by Yue Santos.  03/01/22   12:49 EST    Patient verbalized consent to the visit recording.  I have personally performed the services described in this document as transcribed by " the above individual, and it is both accurate and complete.  Ras Padilla, DO  3/2/2022  09:05 EST

## 2022-03-11 RX ORDER — OXYBUTYNIN CHLORIDE 5 MG/1
TABLET ORAL
Qty: 90 TABLET | Refills: 1 | Status: SHIPPED | OUTPATIENT
Start: 2022-03-11 | End: 2022-08-30 | Stop reason: SDUPTHER

## 2022-03-11 NOTE — TELEPHONE ENCOUNTER
Rx Refill Note  Requested Prescriptions     Pending Prescriptions Disp Refills   • oxybutynin (DITROPAN) 5 MG tablet [Pharmacy Med Name: OXYBUTYNIN CHLORIDE 5 MG Tablet] 90 tablet 1     Sig: TAKE 1 TABLET EVERY NIGHT      Last office visit with prescribing clinician: 3/1/2022      Next office visit with prescribing clinician: Visit date not found            Rossy Page MA  03/11/22, 08:57 EST

## 2022-03-14 RX ORDER — KETOCONAZOLE 20 MG/ML
SHAMPOO TOPICAL
Qty: 120 ML | Refills: 2 | Status: SHIPPED | OUTPATIENT
Start: 2022-03-14 | End: 2022-05-23

## 2022-03-14 NOTE — TELEPHONE ENCOUNTER
Rx Refill Note  Requested Prescriptions     Pending Prescriptions Disp Refills   • ketoconazole (NIZORAL) 2 % shampoo [Pharmacy Med Name: KETOCONAZOLE 2 % Shampoo] 120 mL 2     Sig: APPLY  TOPICALLY TO THE APPROPRIATE AREA AS DIRECTED 1 TIME PER WEEK.      Last office visit with prescribing clinician: 3/1/2022      Next office visit with prescribing clinician: Visit date not found   }  Gill Coleman MA  03/14/22, 08:40 EDT     Last fill: 01/03/2022

## 2022-03-22 ENCOUNTER — APPOINTMENT (OUTPATIENT)
Dept: OTHER | Facility: HOSPITAL | Age: 67
End: 2022-03-22

## 2022-03-22 ENCOUNTER — HOSPITAL ENCOUNTER (OUTPATIENT)
Dept: MAMMOGRAPHY | Facility: HOSPITAL | Age: 67
Discharge: HOME | End: 2022-03-22

## 2022-03-22 ENCOUNTER — HOSPITAL ENCOUNTER (OUTPATIENT)
Dept: BONE DENSITY | Facility: HOSPITAL | Age: 67
Discharge: HOME | End: 2022-03-22

## 2022-03-22 DIAGNOSIS — Z12.31 BREAST CANCER SCREENING BY MAMMOGRAM: ICD-10-CM

## 2022-03-22 DIAGNOSIS — Z78.0 ASYMPTOMATIC AGE-RELATED POSTMENOPAUSAL STATE: ICD-10-CM

## 2022-03-22 PROCEDURE — 77080 DXA BONE DENSITY AXIAL: CPT

## 2022-03-22 PROCEDURE — 77063 BREAST TOMOSYNTHESIS BI: CPT

## 2022-03-22 PROCEDURE — 77063 BREAST TOMOSYNTHESIS BI: CPT | Performed by: RADIOLOGY

## 2022-03-22 PROCEDURE — 77067 SCR MAMMO BI INCL CAD: CPT

## 2022-03-22 PROCEDURE — 77067 SCR MAMMO BI INCL CAD: CPT | Performed by: RADIOLOGY

## 2022-05-17 ENCOUNTER — HOSPITAL ENCOUNTER (OUTPATIENT)
Dept: MAMMOGRAPHY | Facility: HOSPITAL | Age: 67
Discharge: HOME | End: 2022-05-17
Admitting: RADIOLOGY

## 2022-05-17 DIAGNOSIS — R92.8 ABNORMAL MAMMOGRAM: ICD-10-CM

## 2022-05-17 PROCEDURE — 77065 DX MAMMO INCL CAD UNI: CPT

## 2022-05-17 PROCEDURE — G0279 TOMOSYNTHESIS, MAMMO: HCPCS

## 2022-05-17 PROCEDURE — 77065 DX MAMMO INCL CAD UNI: CPT | Performed by: RADIOLOGY

## 2022-05-17 PROCEDURE — G0279 TOMOSYNTHESIS, MAMMO: HCPCS | Performed by: RADIOLOGY

## 2022-05-23 RX ORDER — KETOCONAZOLE 20 MG/ML
SHAMPOO TOPICAL
Qty: 120 ML | Refills: 2 | Status: SHIPPED | OUTPATIENT
Start: 2022-05-23 | End: 2022-09-20 | Stop reason: SDUPTHER

## 2022-05-23 NOTE — TELEPHONE ENCOUNTER
Rx Refill Note  Requested Prescriptions     Pending Prescriptions Disp Refills   • ketoconazole (NIZORAL) 2 % shampoo [Pharmacy Med Name: KETOCONAZOLE 2 % Shampoo] 120 mL 2     Sig: APPLY  TOPICALLY TO THE APPROPRIATE AREA AS DIRECTED 1 TIME PER WEEK.      Last office visit with prescribing clinician: 3/1/2022      Next office visit with prescribing clinician: Visit date not found   Gill Coleman MA  05/23/22, 14:41 EDT     Last fill: 03/14/2022

## 2022-06-14 ENCOUNTER — LAB (OUTPATIENT)
Dept: LAB | Facility: HOSPITAL | Age: 67
End: 2022-06-14

## 2022-06-14 ENCOUNTER — OFFICE VISIT (OUTPATIENT)
Dept: FAMILY MEDICINE CLINIC | Facility: CLINIC | Age: 67
End: 2022-06-14

## 2022-06-14 VITALS
OXYGEN SATURATION: 92 % | HEIGHT: 64 IN | SYSTOLIC BLOOD PRESSURE: 110 MMHG | DIASTOLIC BLOOD PRESSURE: 72 MMHG | BODY MASS INDEX: 24.36 KG/M2 | HEART RATE: 62 BPM

## 2022-06-14 DIAGNOSIS — M25.511 ACUTE PAIN OF RIGHT SHOULDER: ICD-10-CM

## 2022-06-14 DIAGNOSIS — M79.601 RIGHT ARM PAIN: Primary | ICD-10-CM

## 2022-06-14 DIAGNOSIS — E78.5 HYPERLIPIDEMIA, UNSPECIFIED HYPERLIPIDEMIA TYPE: ICD-10-CM

## 2022-06-14 DIAGNOSIS — R73.03 PREDIABETES: ICD-10-CM

## 2022-06-14 DIAGNOSIS — J44.9 MODERATE COPD (CHRONIC OBSTRUCTIVE PULMONARY DISEASE): ICD-10-CM

## 2022-06-14 LAB
CHOLEST SERPL-MCNC: 170 MG/DL (ref 0–200)
HBA1C MFR BLD: 6.5 % (ref 4.8–5.6)
HDLC SERPL-MCNC: 62 MG/DL (ref 40–60)
LDLC SERPL CALC-MCNC: 96 MG/DL (ref 0–100)
LDLC/HDLC SERPL: 1.54 {RATIO}
TRIGL SERPL-MCNC: 64 MG/DL (ref 0–150)
VLDLC SERPL-MCNC: 12 MG/DL (ref 5–40)

## 2022-06-14 PROCEDURE — 83036 HEMOGLOBIN GLYCOSYLATED A1C: CPT

## 2022-06-14 PROCEDURE — 99214 OFFICE O/P EST MOD 30 MIN: CPT | Performed by: FAMILY MEDICINE

## 2022-06-14 PROCEDURE — 36415 COLL VENOUS BLD VENIPUNCTURE: CPT

## 2022-06-14 PROCEDURE — 80061 LIPID PANEL: CPT

## 2022-06-14 RX ORDER — ATORVASTATIN CALCIUM 10 MG/1
10 TABLET, FILM COATED ORAL DAILY
Qty: 90 TABLET | Refills: 1 | Status: SHIPPED | OUTPATIENT
Start: 2022-06-14 | End: 2022-08-30 | Stop reason: SDUPTHER

## 2022-06-14 RX ORDER — FLUTICASONE PROPIONATE AND SALMETEROL XINAFOATE 230; 21 UG/1; UG/1
2 AEROSOL, METERED RESPIRATORY (INHALATION)
Qty: 12 G | Refills: 11 | Status: SHIPPED | OUTPATIENT
Start: 2022-06-14 | End: 2022-08-30 | Stop reason: SDUPTHER

## 2022-06-14 NOTE — PROGRESS NOTES
Established Patient Office Visit      Patient Name: Johnna Contreras  : 1955   MRN: [id number]   Care Team: Patient Care Team:  Ras Padilla DO as PCP - General (Family Medicine)    Chief Complaint:    Chief Complaint   Patient presents with   • Right arm pain       History of Present Illness: Johnna Contreras is a 67 y.o. female who is here today for chief complaint.    HPI    The patient reports that she is having shoulder pain that radiates down the upper extremity. She was last seen in 2022, and there has been no improvement since that time. She took 1 week off from the gym, and her symptoms did not improve. She reports that the pain is mainly in her shoulder, but then goes all the way down her arm. She reports that she has had these symptoms ever since obtaining the COVID-19 booster. She reports that she is using an inhaler, but she does not feel any difference.      This patient is accompanied by an adult female who contributes to the history of their care.    The following portions of the patient's history were reviewed and updated as appropriate: allergies, current medications, past family history, past medical history, past social history, past surgical history and problem list.    Subjective      Review of Systems:   Review of Systems - See HPI    Past Medical History:   Past Medical History:   Diagnosis Date   • GERD (gastroesophageal reflux disease)    • Headache    • Hyperlipidemia    • Hypertension    • Stroke (HCC)        Past Surgical History:   Past Surgical History:   Procedure Laterality Date   • DILATATION AND CURETTAGE     • EYE SURGERY     • TONSILLECTOMY     • TUBAL ABDOMINAL LIGATION         Family History:   Family History   Problem Relation Age of Onset   • Heart attack Mother    • Stroke Mother    • Hypertension Mother    • Hyperlipidemia Mother    • Diabetes Father    • Breast cancer Paternal Grandmother    • Cancer Paternal Grandmother    • Stroke Paternal Grandmother    •  Heart attack Paternal Grandmother    • Ovarian cancer Neg Hx        Social History:   Social History     Socioeconomic History   • Marital status:    Tobacco Use   • Smoking status: Current Every Day Smoker     Packs/day: 0.50     Years: 50.00     Pack years: 25.00     Types: Cigarettes   • Smokeless tobacco: Never Used   Vaping Use   • Vaping Use: Never used   Substance and Sexual Activity   • Alcohol use: Yes     Comment: rarely    • Drug use: No   • Sexual activity: Defer       Tobacco History:   Social History     Tobacco Use   Smoking Status Current Every Day Smoker   • Packs/day: 0.50   • Years: 50.00   • Pack years: 25.00   • Types: Cigarettes   Smokeless Tobacco Never Used       Medications:     Current Outpatient Medications:   •  albuterol sulfate  (90 Base) MCG/ACT inhaler, Inhale 2 puffs Daily., Disp: 18 g, Rfl: 11  •  amLODIPine (NORVASC) 5 MG tablet, Take 1 tablet by mouth Daily., Disp: 90 tablet, Rfl: 3  •  aspirin 81 MG tablet, Take 1 tablet by mouth Daily., Disp: , Rfl:   •  baclofen (LIORESAL) 20 MG tablet, TAKE 1 TABLET FOUR TIMES DAILY, Disp: 360 tablet, Rfl: 1  •  citalopram (CeleXA) 40 MG tablet, Take 1 tablet by mouth Daily., Disp: 90 tablet, Rfl: 3  •  Fexofenadine HCl (ALLERGY 24-HR PO), Take  by mouth., Disp: , Rfl:   •  fluticasone (FLONASE) 50 MCG/ACT nasal spray, USE 1 SPRAY IN EACH NOSTRIL EVERY DAY AS DIRECTED, Disp: 32 g, Rfl: 0  •  hydrocortisone (ANUSOL-HC) 2.5 % rectal cream, Insert  into the rectum 2 (Two) Times a Day., Disp: , Rfl:   •  [START ON 8/1/2022] IncobotulinumtoxinA 100 units reconstituted solution, Inject 400 Units into the appropriate muscle as directed by prescriber Every 3 (Three) Months., Disp: , Rfl:   •  ketoconazole (NIZORAL) 2 % shampoo, APPLY  TOPICALLY TO THE APPROPRIATE AREA AS DIRECTED 1 TIME PER WEEK., Disp: 120 mL, Rfl: 2  •  lisinopril-hydrochlorothiazide (PRINZIDE,ZESTORETIC) 20-12.5 MG per tablet, Take 1 tablet by mouth Daily., Disp: 90  "tablet, Rfl: 3  •  omeprazole (priLOSEC) 40 MG capsule, Take 1 capsule by mouth 2 (Two) Times a Day., Disp: 180 capsule, Rfl: 3  •  oxybutynin (DITROPAN) 5 MG tablet, TAKE 1 TABLET EVERY NIGHT, Disp: 90 tablet, Rfl: 1  •  atorvastatin (LIPITOR) 10 MG tablet, Take 1 tablet by mouth Daily., Disp: 90 tablet, Rfl: 1  •  fluticasone-salmeterol (Advair HFA) 230-21 MCG/ACT inhaler, Inhale 2 puffs 2 (Two) Times a Day., Disp: 12 g, Rfl: 11    Allergies:   No Known Allergies    Objective   Objective     Physical Exam:  Vital Signs:   Vitals:    06/14/22 0915   BP: 110/72   Pulse: 62   SpO2: 92%   Weight: Comment: unable to weigh   Height: 162.6 cm (64.02\")     Body mass index is 24.36 kg/m².     Physical Exam  Nursing note reviewed  Const: NAD, A&Ox4, Pleasant, Cooperative  Eyes: EOMI, no conjunctivitis  ENT: No nasal discharge present, neck supple  Cardiac: Regular rate and rhythm, no cyanosis  Resp: Respiratory rate within normal limits, no increased work of breathing, no audible wheezing or retractions noted  GI: No distention or ascites  MSK: Motor and sensation grossly intact in bilateral upper extremities  Neurologic: CN II-XII grossly intact  Psych: Appropriate mood and behavior.  Skin: Warm, dry  Procedures/Radiology     Procedures  XR Spine Cervical 2 or 3 View    Result Date: 6/16/2022  Advanced multilevel spondylosis changes present as above, appearing most severe at C5-6 and C6-7.  Mild dextrocurvature is apparent on AP view and there is also apparent, age indeterminate leftward lateral subluxation of C1 on C2. Correlate with any concern for recent trauma or acute ligamentous injury. These findings could potentially also be chronic.  This report was finalized on 6/16/2022 10:39 AM by Martell Candelaria.      XR Shoulder 2+ View Right    Result Date: 6/16/2022  Mild acromioclavicular glenohumeral arthrosis is present with narrowing, sclerosis and tiny osteophytes. There is otherwise no evidence of fracture or " dislocation. Mild irregularity is noted involving the humerus footprint, possibly reflecting underlying cuff pathology.  This report was finalized on 6/16/2022 10:39 AM by Martell Candelaria.         Assessment & Plan   Assessment / Plan      Assessment/Plan:   Problems Addressed This Visit  Diagnoses and all orders for this visit:    1. Right arm pain (Primary)  Comments:  The patient reports that she is having right shoulder pain that radiates down the right upper extremity.   Orders:  -     XR Shoulder 2+ View Right; Future  -     XR Spine Cervical 2 or 3 View; Future    2. Moderate COPD (chronic obstructive pulmonary disease) (HCC)  Comments:  COPD.  I will start Advair 2 puffs daily.  Orders:  -     fluticasone-salmeterol (Advair HFA) 230-21 MCG/ACT inhaler; Inhale 2 puffs 2 (Two) Times a Day.  Dispense: 12 g; Refill: 11    3. Prediabetes  -     Hemoglobin A1c; Future    4. Hyperlipidemia, unspecified hyperlipidemia type  Assessment & Plan:  Goal LDL <70 d/t h/o stroke. Not on statin. Rx for atorvastatin 10 sent to pharmacy.  -Recheck in 3 months    Orders:  -     Lipid Panel; Future  -     atorvastatin (LIPITOR) 10 MG tablet; Take 1 tablet by mouth Daily.  Dispense: 90 tablet; Refill: 1    5. Acute pain of right shoulder  -     Ambulatory Referral to Orthopedic Surgery    Problem List Items Addressed This Visit        Cardiac and Vasculature    Hyperlipidemia    Current Assessment & Plan     Goal LDL <70 d/t h/o stroke. Not on statin. Rx for atorvastatin 10 sent to pharmacy.  -Recheck in 3 months           Relevant Medications    atorvastatin (LIPITOR) 10 MG tablet    Other Relevant Orders    Lipid Panel       Endocrine and Metabolic    Prediabetes    Relevant Orders    Hemoglobin A1c      Other Visit Diagnoses     Right arm pain    -  Primary    The patient reports that she is having right shoulder pain that radiates down the right upper extremity.     Relevant Orders    XR Shoulder 2+ View Right (Completed)     XR Spine Cervical 2 or 3 View (Completed)    Moderate COPD (chronic obstructive pulmonary disease) (HCC)        COPD.  I will start Advair 2 puffs daily.    Relevant Medications    fluticasone-salmeterol (Advair HFA) 230-21 MCG/ACT inhaler    Acute pain of right shoulder        Relevant Orders    Ambulatory Referral to Orthopedic Surgery              There are no Patient Instructions on file for this visit.    Follow Up:   Return in about 3 months (around 9/14/2022) for f/u COPD.        MDM     [address]  [facility]  [address]  [facility] [address]  Fax [phone]  Phone [phone]    Transcribed from ambient dictation for Ras Padilla DO by VINCE YOUNGER.  06/14/22   11:36 EDT    Patient verbalized consent to the visit recording.

## 2022-06-14 NOTE — ASSESSMENT & PLAN NOTE
Goal LDL <70 d/t h/o stroke. Not on statin. Rx for atorvastatin 10 sent to pharmacy.  -Recheck in 3 months

## 2022-06-16 ENCOUNTER — HOSPITAL ENCOUNTER (OUTPATIENT)
Dept: GENERAL RADIOLOGY | Facility: HOSPITAL | Age: 67
Discharge: HOME | End: 2022-06-16
Admitting: FAMILY MEDICINE

## 2022-06-16 DIAGNOSIS — M79.601 RIGHT ARM PAIN: ICD-10-CM

## 2022-06-16 PROCEDURE — 72040 X-RAY EXAM NECK SPINE 2-3 VW: CPT

## 2022-06-16 PROCEDURE — 73030 X-RAY EXAM OF SHOULDER: CPT

## 2022-07-08 ENCOUNTER — OFFICE VISIT (OUTPATIENT)
Dept: ORTHOPEDIC SURGERY | Facility: CLINIC | Age: 67
End: 2022-07-08

## 2022-07-08 VITALS
WEIGHT: 138 LBS | DIASTOLIC BLOOD PRESSURE: 70 MMHG | HEIGHT: 64 IN | BODY MASS INDEX: 23.56 KG/M2 | SYSTOLIC BLOOD PRESSURE: 118 MMHG

## 2022-07-08 DIAGNOSIS — M75.51 BURSITIS OF RIGHT SHOULDER: Primary | ICD-10-CM

## 2022-07-08 DIAGNOSIS — M75.41 IMPINGEMENT SYNDROME OF RIGHT SHOULDER: ICD-10-CM

## 2022-07-08 PROCEDURE — 99204 OFFICE O/P NEW MOD 45 MIN: CPT | Performed by: ORTHOPAEDIC SURGERY

## 2022-07-08 PROCEDURE — 20610 DRAIN/INJ JOINT/BURSA W/O US: CPT | Performed by: ORTHOPAEDIC SURGERY

## 2022-07-08 RX ORDER — LIDOCAINE HYDROCHLORIDE 10 MG/ML
5 INJECTION, SOLUTION EPIDURAL; INFILTRATION; INTRACAUDAL; PERINEURAL
Status: COMPLETED | OUTPATIENT
Start: 2022-07-08 | End: 2022-07-08

## 2022-07-08 RX ORDER — MULTIPLE VITAMINS W/ MINERALS TAB 9MG-400MCG
1 TAB ORAL DAILY
COMMUNITY

## 2022-07-08 RX ORDER — IBUPROFEN 200 MG
200 TABLET ORAL EVERY 6 HOURS PRN
COMMUNITY
End: 2022-08-30 | Stop reason: SDUPTHER

## 2022-07-08 RX ORDER — TRIAMCINOLONE ACETONIDE 40 MG/ML
40 INJECTION, SUSPENSION INTRA-ARTICULAR; INTRAMUSCULAR
Status: COMPLETED | OUTPATIENT
Start: 2022-07-08 | End: 2022-07-08

## 2022-07-08 RX ADMIN — TRIAMCINOLONE ACETONIDE 40 MG: 40 INJECTION, SUSPENSION INTRA-ARTICULAR; INTRAMUSCULAR at 10:05

## 2022-07-08 RX ADMIN — LIDOCAINE HYDROCHLORIDE 5 ML: 10 INJECTION, SOLUTION EPIDURAL; INFILTRATION; INTRACAUDAL; PERINEURAL at 10:05

## 2022-07-08 NOTE — PROGRESS NOTES
Procedure   - Large Joint Arthrocentesis: R subacromial bursa on 7/8/2022 10:05 AM  Indications: pain  Details: 22 G needle, posterior approach  Medications: 5 mL lidocaine PF 1% 1 %; 40 mg triamcinolone acetonide 40 MG/ML  Outcome: tolerated well, no immediate complications  Procedure, treatment alternatives, risks and benefits explained, specific risks discussed. Consent was given by the patient. Immediately prior to procedure a time out was called to verify the correct patient, procedure, equipment, support staff and site/side marked as required. Patient was prepped and draped in the usual sterile fashion.

## 2022-07-08 NOTE — PROGRESS NOTES
"                                                                    [facility] [place] Visit - Pee Gee MD    Office Visit       Patient Name: Johnna Contreras    Chief Complaint:   Chief Complaint   Patient presents with   • Right Shoulder - Pain       Referring Physician: Ras Padilla DO-I appreciate the referral    History of Present Illness:   Johnna Contreras is a 67 y.o. female who presents with right body part: shoulder Reason: pain.  Onset:Onset: atraumatic and gradual in nature. The issue has been ongoing for 3 month(s). Pain is a 8/10 on the pain scale. Pain is described as Pain Characterization: dull, aching and burning. Associated symptoms include Symptoms: pain and stiffness. The pain is worse with certain movements; muscle relaxer improve the pain. Previous treatments have included: NSAIDS.  I have reviewed the patient's history of present illness as noted/entered above.    I have reviewed the patient's past medical history, surgical history, social history, family history, medications, and allergies as noted in the electronic medical record and as noted/entered.  I have reviewed the patient's review of systems as noted/enter and updated as noted in the patient's HPI.    Right shoulder pain atraumatic  Pain worse over the last 3 months  Pain and stiffness  Treated with NSAIDs, baclofen  Fairly significant neck findings noted on her neck x-rays she does have radiating pain counseled on potential neurogenic origin of radiating pain down the arm, especially with the shooting pain and hand symptoms -- she may need neck work-up and referral to neck physician -- will defer to her primary team    \"-ee\"    2015 -- powerchair -- stroke history -- affects her left side, short walking distances    +Smoking daily    Enjoys: puzzles    Subjective   Subjective      Review of Systems   Constitutional: Positive for fatigue. Negative for chills and fever.   HENT: Positive for congestion, " drooling, hearing loss, sinus pressure and sneezing. Negative for dental problem.    Eyes: Negative.  Negative for blurred vision.   Respiratory: Positive for cough. Negative for shortness of breath.    Cardiovascular: Positive for leg swelling.   Gastrointestinal: Positive for nausea. Negative for abdominal pain.   Endocrine: Negative.  Negative for polyuria.   Genitourinary: Positive for frequency. Negative for difficulty urinating.   Musculoskeletal: Positive for arthralgias, back pain, neck pain and neck stiffness.   Skin: Negative.    Allergic/Immunologic: Negative.    Neurological: Positive for speech difficulty, weakness and headache.   Hematological: Negative.  Negative for adenopathy.   Psychiatric/Behavioral: Negative.  Negative for behavioral problems.        Past Medical History:   Past Medical History:   Diagnosis Date   • Back problem    • Bronchitis    • Claustrophobia    • GERD (gastroesophageal reflux disease)    • Headache    • Hyperlipidemia    • Hypertension    • Seizures (HCC)     last seizure at the age of 21   • Stroke (HCC)        Past Surgical History:   Past Surgical History:   Procedure Laterality Date   • DILATATION AND CURETTAGE     • EYE SURGERY     • TONSILLECTOMY     • TUBAL ABDOMINAL LIGATION         Family History:   Family History   Problem Relation Age of Onset   • Heart attack Mother    • Stroke Mother    • Hypertension Mother    • Hyperlipidemia Mother    • Diabetes Father    • Breast cancer Paternal Grandmother    • Cancer Paternal Grandmother    • Stroke Paternal Grandmother    • Heart attack Paternal Grandmother    • Ovarian cancer Neg Hx        Social History:   Social History     Socioeconomic History   • Marital status:    Tobacco Use   • Smoking status: Current Every Day Smoker     Packs/day: 1.00     Years: 50.00     Pack years: 50.00     Types: Cigarettes   • Smokeless tobacco: Never Used   Vaping Use   • Vaping Use: Never used   Substance and Sexual Activity   •  Alcohol use: Yes     Comment: rarely    • Drug use: No   • Sexual activity: Defer       Medications:   Current Outpatient Medications:   •  albuterol sulfate  (90 Base) MCG/ACT inhaler, Inhale 2 puffs Daily., Disp: 18 g, Rfl: 11  •  amLODIPine (NORVASC) 5 MG tablet, Take 1 tablet by mouth Daily., Disp: 90 tablet, Rfl: 3  •  aspirin 81 MG tablet, Take 1 tablet by mouth Daily., Disp: , Rfl:   •  atorvastatin (LIPITOR) 10 MG tablet, Take 1 tablet by mouth Daily., Disp: 90 tablet, Rfl: 1  •  baclofen (LIORESAL) 20 MG tablet, TAKE 1 TABLET FOUR TIMES DAILY, Disp: 360 tablet, Rfl: 1  •  Calcium-Magnesium-Vitamin D (CALCIUM 1200+D3 PO), Take  by mouth., Disp: , Rfl:   •  citalopram (CeleXA) 40 MG tablet, Take 1 tablet by mouth Daily., Disp: 90 tablet, Rfl: 3  •  Fexofenadine HCl (ALLERGY 24-HR PO), Take  by mouth., Disp: , Rfl:   •  fluticasone (FLONASE) 50 MCG/ACT nasal spray, USE 1 SPRAY IN EACH NOSTRIL EVERY DAY AS DIRECTED, Disp: 32 g, Rfl: 0  •  fluticasone-salmeterol (Advair HFA) 230-21 MCG/ACT inhaler, Inhale 2 puffs 2 (Two) Times a Day., Disp: 12 g, Rfl: 11  •  hydrocortisone (ANUSOL-HC) 2.5 % rectal cream, Insert  into the rectum 2 (Two) Times a Day., Disp: , Rfl:   •  ibuprofen (ADVIL,MOTRIN) 200 MG tablet, Take 200 mg by mouth Every 6 (Six) Hours As Needed for Mild Pain ., Disp: , Rfl:   •  [START ON 8/1/2022] IncobotulinumtoxinA 100 units reconstituted solution, Inject 400 Units into the appropriate muscle as directed by prescriber Every 3 (Three) Months., Disp: , Rfl:   •  ketoconazole (NIZORAL) 2 % shampoo, APPLY  TOPICALLY TO THE APPROPRIATE AREA AS DIRECTED 1 TIME PER WEEK., Disp: 120 mL, Rfl: 2  •  lisinopril-hydrochlorothiazide (PRINZIDE,ZESTORETIC) 20-12.5 MG per tablet, Take 1 tablet by mouth Daily., Disp: 90 tablet, Rfl: 3  •  multivitamin with minerals (CENTRUM SILVER ADULT 50+ PO), Take 1 tablet by mouth Daily., Disp: , Rfl:   •  omeprazole (priLOSEC) 40 MG capsule, Take 1 capsule by mouth 2  "(Two) Times a Day., Disp: 180 capsule, Rfl: 3  •  oxybutynin (DITROPAN) 5 MG tablet, TAKE 1 TABLET EVERY NIGHT, Disp: 90 tablet, Rfl: 1    Allergies: No Known Allergies    The following portions of the patient's history were reviewed and updated as appropriate: allergies, current medications, past family history, past medical history, past social history, past surgical history and problem list.        Objective    Objective      Vital Signs:   Vitals:    07/08/22 0924   BP: 118/70   Weight: 62.6 kg (138 lb)   Height: 162.6 cm (64.02\")       Ortho Exam:  Presents in a power chair  Right shoulder has fairly excellent active and passive range of motion but positive Neer positive Escoto consistent with impingement syndrome    Results Review:   Imaging Results (Last 24 Hours)     ** No results found for the last 24 hours. **        XR Spine Cervical 2 or 3 View    Result Date: 6/16/2022  Advanced multilevel spondylosis changes present as above, appearing most severe at C5-6 and C6-7.  Mild dextrocurvature is apparent on AP view and there is also apparent, age indeterminate leftward lateral subluxation of C1 on C2. Correlate with any concern for recent trauma or acute ligamentous injury. These findings could potentially also be chronic.  This report was finalized on 6/16/2022 10:39 AM by Martell Candelaria.      XR Shoulder 2+ View Right    Result Date: 6/16/2022  Mild acromioclavicular glenohumeral arthrosis is present with narrowing, sclerosis and tiny osteophytes. There is otherwise no evidence of fracture or dislocation. Mild irregularity is noted involving the humerus footprint, possibly reflecting underlying cuff pathology.  This report was finalized on 6/16/2022 10:39 AM by Martell Candelaria.        Procedures     Right SHOULDER SUBACROMIAL SPACE INJECTION: Risks and benefits of a shoulder subacromial space injection were discussed and the patient desired to proceed. Verbal consent was obtained. The patient " understood the risk of infection, potential skin changes, bump in blood glucose especially with diabetes, nerve injury, possibility of increased pain in the short term, and possible incomplete pain relief.  Using sterile technique, the shoulder subacromial space was injected from a posterior approach with 1mL of 40 mg triamcinolone acetonide 40 MG/ML and 4cc of lidocaine with aspiration prior to injection. The patient tolerated the procedure without difficulty.  CPT CODE 73672 for major joint aspiration/injection        Assessment / Plan      Assessment/Plan:   Problem List Items Addressed This Visit        Musculoskeletal and Injuries    Bursitis of right shoulder - Primary    Impingement syndrome of right shoulder        RIGHT SHOULDER -- conservative course recommended, PT, injection, consider shoulder MRI if not improving with conservative course    Fairly significant neck findings noted on her neck x-rays she does have radiating pain counseled on potential neurogenic origin of radiating pain down the arm, especially with the shooting pain and hand symptoms -- she may need neck work-up and referral to neck physician -- will defer to her primary team    Follow Up: 2-3 months        Pee Gee MD, FAAOS  Orthopedic Surgeon  Fellowship Trained Shoulder and Elbow Surgeon  [facility]  Orthopedics and Sports Medicine  [address] [place] [address], [address]  [place] [address]    07/08/22  10:30 EDT

## 2022-08-25 ENCOUNTER — TELEPHONE (OUTPATIENT)
Dept: FAMILY MEDICINE CLINIC | Facility: CLINIC | Age: 67
End: 2022-08-25

## 2022-08-25 NOTE — TELEPHONE ENCOUNTER
Caller: Johnna Contreras    Relationship: Self    Best call back number: [phone]    What form or medical record are you requesting: HEALTHY FOOD CARD     Who is requesting this form or medical record from you: [employer]     How would you like to receive the form or medical records (pick-up, mail, fax): FAX   If fax, what is the fax number: ON FORM THAT IS BEING FAXED   If mail, what is the address:   If pick-up, provide patient with address and location details    Timeframe paperwork needed: WITHIN A WEEK     Additional notes: PATIENT STATES HER INSURANCE COMPANY IS FAXING A FORM FOR HER PCP TO COMPLETE THAT WILL GIVE HER $50 A MONTH TOWARDS FOOD THAT WILL NEED TO BE COMPLETED AND FAXED BACK.

## 2022-08-30 ENCOUNTER — TELEPHONE (OUTPATIENT)
Dept: FAMILY MEDICINE CLINIC | Facility: CLINIC | Age: 67
End: 2022-08-30

## 2022-08-30 DIAGNOSIS — Z87.891 PERSONAL HISTORY OF TOBACCO USE, PRESENTING HAZARDS TO HEALTH: ICD-10-CM

## 2022-08-30 DIAGNOSIS — J30.89 ALLERGIC RHINITIS DUE TO OTHER ALLERGIC TRIGGER, UNSPECIFIED SEASONALITY: ICD-10-CM

## 2022-08-30 DIAGNOSIS — E78.5 HYPERLIPIDEMIA, UNSPECIFIED HYPERLIPIDEMIA TYPE: ICD-10-CM

## 2022-08-30 DIAGNOSIS — J44.9 MODERATE COPD (CHRONIC OBSTRUCTIVE PULMONARY DISEASE): ICD-10-CM

## 2022-08-30 RX ORDER — ATORVASTATIN CALCIUM 10 MG/1
10 TABLET, FILM COATED ORAL DAILY
Qty: 90 TABLET | Refills: 1 | Status: SHIPPED | OUTPATIENT
Start: 2022-08-30 | End: 2023-01-31 | Stop reason: SINTOL

## 2022-08-30 RX ORDER — OXYBUTYNIN CHLORIDE 5 MG/1
5 TABLET ORAL NIGHTLY
Qty: 90 TABLET | Refills: 1 | Status: SHIPPED | OUTPATIENT
Start: 2022-08-30 | End: 2023-02-27

## 2022-08-30 RX ORDER — BACLOFEN 20 MG/1
20 TABLET ORAL 4 TIMES DAILY
Qty: 360 TABLET | Refills: 1 | Status: SHIPPED | OUTPATIENT
Start: 2022-08-30 | End: 2023-02-27

## 2022-08-30 RX ORDER — AMLODIPINE BESYLATE 5 MG/1
5 TABLET ORAL DAILY
Qty: 90 TABLET | Refills: 3 | Status: SHIPPED | OUTPATIENT
Start: 2022-08-30

## 2022-08-30 RX ORDER — FLUTICASONE PROPIONATE 50 MCG
1 SPRAY, SUSPENSION (ML) NASAL DAILY
Qty: 32 G | Refills: 0 | Status: SHIPPED | OUTPATIENT
Start: 2022-08-30

## 2022-08-30 RX ORDER — LISINOPRIL AND HYDROCHLOROTHIAZIDE 20; 12.5 MG/1; MG/1
1 TABLET ORAL DAILY
Qty: 90 TABLET | Refills: 3 | Status: SHIPPED | OUTPATIENT
Start: 2022-08-30 | End: 2023-02-28 | Stop reason: ALTCHOICE

## 2022-08-30 RX ORDER — FLUTICASONE PROPIONATE AND SALMETEROL XINAFOATE 230; 21 UG/1; UG/1
2 AEROSOL, METERED RESPIRATORY (INHALATION)
Qty: 12 G | Refills: 11 | Status: SHIPPED | OUTPATIENT
Start: 2022-08-30 | End: 2023-01-31

## 2022-08-30 RX ORDER — CITALOPRAM 40 MG/1
40 TABLET ORAL DAILY
Qty: 90 TABLET | Refills: 3 | Status: SHIPPED | OUTPATIENT
Start: 2022-08-30

## 2022-08-30 RX ORDER — ALBUTEROL SULFATE 90 UG/1
2 AEROSOL, METERED RESPIRATORY (INHALATION) DAILY
Qty: 18 G | Refills: 11 | Status: SHIPPED | OUTPATIENT
Start: 2022-08-30 | End: 2022-09-20 | Stop reason: SDUPTHER

## 2022-08-30 RX ORDER — IBUPROFEN 200 MG
200 TABLET ORAL EVERY 6 HOURS PRN
Qty: 90 TABLET | Refills: 2 | Status: SHIPPED | OUTPATIENT
Start: 2022-08-30

## 2022-08-30 NOTE — TELEPHONE ENCOUNTER
Rx Refill Note  Requested Prescriptions     Signed Prescriptions Disp Refills   • albuterol sulfate  (90 Base) MCG/ACT inhaler 18 g 11     Sig: Inhale 2 puffs Daily.     Authorizing Provider: DAMIEN PORTILLO     Ordering User: DIONI SOUZA   • amLODIPine (NORVASC) 5 MG tablet 90 tablet 3     Sig: Take 1 tablet by mouth Daily.     Authorizing Provider: DAMIEN PORTILLO     Ordering User: DIONI SOUZA   • atorvastatin (LIPITOR) 10 MG tablet 90 tablet 1     Sig: Take 1 tablet by mouth Daily.     Authorizing Provider: DAMIEN PORTILLO     Ordering User: DIONI SOUZA   • baclofen (LIORESAL) 20 MG tablet 360 tablet 1     Sig: Take 1 tablet by mouth 4 (Four) Times a Day.     Authorizing Provider: DAMIEN PORTILLO     Ordering User: DIONI SOUZA   • citalopram (CeleXA) 40 MG tablet 90 tablet 3     Sig: Take 1 tablet by mouth Daily.     Authorizing Provider: DAMIEN PORTILLO     Ordering User: DIONI SOUZA   • fluticasone (FLONASE) 50 MCG/ACT nasal spray 32 g 0     Si spray into the nostril(s) as directed by provider Daily.     Authorizing Provider: DAMIEN PORTILLO     Ordering User: DIONI SOUZA   • fluticasone-salmeterol (Advair HFA) 230-21 MCG/ACT inhaler 12 g 11     Sig: Inhale 2 puffs 2 (Two) Times a Day.     Authorizing Provider: DAMIEN PORTILLO     Ordering User: DIONI SOUZA   • ibuprofen (ADVIL,MOTRIN) 200 MG tablet 90 tablet 2     Sig: Take 1 tablet by mouth Every 6 (Six) Hours As Needed for Mild Pain.     Authorizing Provider: DAMIEN PORTILLO     Ordering User: DIONI SOUZA   • lisinopril-hydrochlorothiazide (PRINZIDE,ZESTORETIC) 20-12.5 MG per tablet 90 tablet 3     Sig: Take 1 tablet by mouth Daily.     Authorizing Provider: DAMIEN PORTILLO     Ordering User: DIONI SOUZA   • oxybutynin (DITROPAN) 5 MG tablet 90 tablet 1     Sig: Take 1 tablet by mouth Every Night.     Authorizing Provider: DAMIEN PORTILLO     Ordering User:  NING OSBORNE      Last office visit with prescribing clinician: 6/14/2022      Next office visit with prescribing clinician: 9/15/2022            Ning Osborne MA  08/30/22, 16:40 EDT

## 2022-09-14 RX ORDER — OXYBUTYNIN CHLORIDE 5 MG/1
5 TABLET ORAL NIGHTLY
Qty: 90 TABLET | Refills: 1 | Status: CANCELLED | OUTPATIENT
Start: 2022-09-14

## 2022-09-20 ENCOUNTER — LAB (OUTPATIENT)
Dept: LAB | Facility: HOSPITAL | Age: 67
End: 2022-09-20

## 2022-09-20 ENCOUNTER — OFFICE VISIT (OUTPATIENT)
Dept: FAMILY MEDICINE CLINIC | Facility: CLINIC | Age: 67
End: 2022-09-20

## 2022-09-20 VITALS
BODY MASS INDEX: 23.2 KG/M2 | SYSTOLIC BLOOD PRESSURE: 98 MMHG | WEIGHT: 135.2 LBS | TEMPERATURE: 98.7 F | OXYGEN SATURATION: 96 % | DIASTOLIC BLOOD PRESSURE: 60 MMHG | HEART RATE: 76 BPM

## 2022-09-20 DIAGNOSIS — R09.81 NASAL CONGESTION: ICD-10-CM

## 2022-09-20 DIAGNOSIS — J44.9 MODERATE COPD (CHRONIC OBSTRUCTIVE PULMONARY DISEASE): Primary | ICD-10-CM

## 2022-09-20 DIAGNOSIS — R73.03 PREDIABETES: ICD-10-CM

## 2022-09-20 DIAGNOSIS — K21.9 GASTROESOPHAGEAL REFLUX DISEASE WITHOUT ESOPHAGITIS: ICD-10-CM

## 2022-09-20 DIAGNOSIS — E78.5 HYPERLIPIDEMIA, UNSPECIFIED HYPERLIPIDEMIA TYPE: ICD-10-CM

## 2022-09-20 DIAGNOSIS — I10 PRIMARY HYPERTENSION: ICD-10-CM

## 2022-09-20 DIAGNOSIS — Z87.891 PERSONAL HISTORY OF TOBACCO USE, PRESENTING HAZARDS TO HEALTH: ICD-10-CM

## 2022-09-20 LAB
ALBUMIN SERPL-MCNC: 4.2 G/DL (ref 3.5–5.2)
ALBUMIN/GLOB SERPL: 1.4 G/DL
ALP SERPL-CCNC: 146 U/L (ref 39–117)
ALT SERPL W P-5'-P-CCNC: 39 U/L (ref 1–33)
ANION GAP SERPL CALCULATED.3IONS-SCNC: 11 MMOL/L (ref 5–15)
AST SERPL-CCNC: 30 U/L (ref 1–32)
BILIRUB SERPL-MCNC: 0.3 MG/DL (ref 0–1.2)
BUN SERPL-MCNC: 11 MG/DL (ref 8–23)
BUN/CREAT SERPL: 14.7 (ref 7–25)
CALCIUM SPEC-SCNC: 9.7 MG/DL (ref 8.6–10.5)
CHLORIDE SERPL-SCNC: 95 MMOL/L (ref 98–107)
CHOLEST SERPL-MCNC: 144 MG/DL (ref 0–200)
CO2 SERPL-SCNC: 26 MMOL/L (ref 22–29)
CREAT SERPL-MCNC: 0.75 MG/DL (ref 0.57–1)
EGFRCR SERPLBLD CKD-EPI 2021: 87.9 ML/MIN/1.73
GLOBULIN UR ELPH-MCNC: 3 GM/DL
GLUCOSE SERPL-MCNC: 93 MG/DL (ref 65–99)
HBA1C MFR BLD: 6.2 % (ref 4.8–5.6)
HDLC SERPL-MCNC: 62 MG/DL (ref 40–60)
LDLC SERPL CALC-MCNC: 70 MG/DL (ref 0–100)
LDLC/HDLC SERPL: 1.14 {RATIO}
POTASSIUM SERPL-SCNC: 3.7 MMOL/L (ref 3.5–5.2)
PROT SERPL-MCNC: 7.2 G/DL (ref 6–8.5)
SODIUM SERPL-SCNC: 132 MMOL/L (ref 136–145)
TRIGL SERPL-MCNC: 56 MG/DL (ref 0–150)
VLDLC SERPL-MCNC: 12 MG/DL (ref 5–40)

## 2022-09-20 PROCEDURE — 80061 LIPID PANEL: CPT

## 2022-09-20 PROCEDURE — 83036 HEMOGLOBIN GLYCOSYLATED A1C: CPT

## 2022-09-20 PROCEDURE — 80053 COMPREHEN METABOLIC PANEL: CPT

## 2022-09-20 PROCEDURE — 99214 OFFICE O/P EST MOD 30 MIN: CPT | Performed by: FAMILY MEDICINE

## 2022-09-20 RX ORDER — ALBUTEROL SULFATE 90 UG/1
2 AEROSOL, METERED RESPIRATORY (INHALATION) DAILY
Qty: 18 G | Refills: 11 | Status: SHIPPED | OUTPATIENT
Start: 2022-09-20

## 2022-09-20 RX ORDER — OMEPRAZOLE 40 MG/1
40 CAPSULE, DELAYED RELEASE ORAL 2 TIMES DAILY
Qty: 180 CAPSULE | Refills: 3 | Status: SHIPPED | OUTPATIENT
Start: 2022-09-20

## 2022-09-20 RX ORDER — BROMPHENIRAMINE MALEATE, PSEUDOEPHEDRINE HYDROCHLORIDE, AND DEXTROMETHORPHAN HYDROBROMIDE 2; 30; 10 MG/5ML; MG/5ML; MG/5ML
5 SYRUP ORAL 4 TIMES DAILY PRN
Qty: 118 ML | Refills: 1 | Status: SHIPPED | OUTPATIENT
Start: 2022-09-20 | End: 2022-09-27

## 2022-09-20 RX ORDER — KETOCONAZOLE 20 MG/ML
SHAMPOO TOPICAL WEEKLY
Qty: 120 ML | Refills: 2 | Status: SHIPPED | OUTPATIENT
Start: 2022-09-20

## 2022-09-20 NOTE — PROGRESS NOTES
Established Patient Office Visit      Patient Name: Johnna Contreras  : 1955   MRN: [id number]   Care Team: Patient Care Team:  Ras Padilla DO as PCP - General (Family Medicine)    Chief Complaint:    Chief Complaint   Patient presents with   • Follow-up     3 month Follow up. COPD       History of Present Illness: Johnna Contreras is a 67 y.o. female who is here today for chief complaint.    HPI    The patient presents today for a regular follow-up on her COPD.    She reports her breathing is doing well. She is using her inhalers, but she does not feel like the inhalers have helped. She is currently taking Advair 2 puffs twice daily, and albuterol as needed. She has not had anything to eat today.    She reports she is doing well with her new cholesterol medication. She was started on low-dose atorvastatin 10 mg at her last visit.    She reports her blood pressure generally stays in the 160s to 120s at home.    She reports strength in her legs is doing well. She does not feel like she needs physical therapy to work on it.    She reports she has no energy, real achy and has a cough. She is not taking anything for her cold symptoms, but her nose gets runny and she uses a decongestant.    The following portions of the patient's history were reviewed and updated as appropriate: allergies, current medications, past family history, past medical history, past social history, past surgical history and problem list.    Subjective      Review of Systems:   Review of Systems - See HPI    Past Medical History:   Past Medical History:   Diagnosis Date   • Back problem    • Bronchitis    • Claustrophobia    • GERD (gastroesophageal reflux disease)    • Headache    • Hyperlipidemia    • Hypertension    • Seizures (HCC)     last seizure at the age of 21   • Stroke (HCC)        Past Surgical History:   Past Surgical History:   Procedure Laterality Date   • DILATATION AND CURETTAGE     • EYE SURGERY     • TONSILLECTOMY      • TUBAL ABDOMINAL LIGATION         Family History:   Family History   Problem Relation Age of Onset   • Heart attack Mother    • Stroke Mother    • Hypertension Mother    • Hyperlipidemia Mother    • Diabetes Father    • Breast cancer Paternal Grandmother    • Cancer Paternal Grandmother    • Stroke Paternal Grandmother    • Heart attack Paternal Grandmother    • Ovarian cancer Neg Hx        Social History:   Social History     Socioeconomic History   • Marital status:    Tobacco Use   • Smoking status: Current Every Day Smoker     Packs/day: 1.00     Years: 50.00     Pack years: 50.00     Types: Cigarettes   • Smokeless tobacco: Never Used   Vaping Use   • Vaping Use: Never used   Substance and Sexual Activity   • Alcohol use: Yes     Comment: rarely    • Drug use: No   • Sexual activity: Defer       Tobacco History:   Social History     Tobacco Use   Smoking Status Current Every Day Smoker   • Packs/day: 1.00   • Years: 50.00   • Pack years: 50.00   • Types: Cigarettes   Smokeless Tobacco Never Used       Medications:     Current Outpatient Medications:   •  albuterol sulfate  (90 Base) MCG/ACT inhaler, Inhale 2 puffs Daily., Disp: 18 g, Rfl: 11  •  amLODIPine (NORVASC) 5 MG tablet, Take 1 tablet by mouth Daily., Disp: 90 tablet, Rfl: 3  •  aspirin 81 MG tablet, Take 1 tablet by mouth Daily., Disp: , Rfl:   •  atorvastatin (LIPITOR) 10 MG tablet, Take 1 tablet by mouth Daily., Disp: 90 tablet, Rfl: 1  •  baclofen (LIORESAL) 20 MG tablet, Take 1 tablet by mouth 4 (Four) Times a Day., Disp: 360 tablet, Rfl: 1  •  Calcium-Magnesium-Vitamin D (CALCIUM 1200+D3 PO), Take  by mouth., Disp: , Rfl:   •  citalopram (CeleXA) 40 MG tablet, Take 1 tablet by mouth Daily., Disp: 90 tablet, Rfl: 3  •  Fexofenadine HCl (ALLERGY 24-HR PO), Take  by mouth., Disp: , Rfl:   •  fluticasone (FLONASE) 50 MCG/ACT nasal spray, 1 spray into the nostril(s) as directed by provider Daily., Disp: 32 g, Rfl: 0  •   fluticasone-salmeterol (Advair HFA) 230-21 MCG/ACT inhaler, Inhale 2 puffs 2 (Two) Times a Day., Disp: 12 g, Rfl: 11  •  hydrocortisone (ANUSOL-HC) 2.5 % rectal cream, Insert  into the rectum 2 (Two) Times a Day., Disp: , Rfl:   •  ibuprofen (ADVIL,MOTRIN) 200 MG tablet, Take 1 tablet by mouth Every 6 (Six) Hours As Needed for Mild Pain., Disp: 90 tablet, Rfl: 2  •  IncobotulinumtoxinA 100 units reconstituted solution, Inject 400 Units into the appropriate muscle as directed by prescriber Every 3 (Three) Months., Disp: , Rfl:   •  ketoconazole (NIZORAL) 2 % shampoo, Apply  topically to the appropriate area as directed 1 (One) Time Per Week., Disp: 120 mL, Rfl: 2  •  lisinopril-hydrochlorothiazide (PRINZIDE,ZESTORETIC) 20-12.5 MG per tablet, Take 1 tablet by mouth Daily., Disp: 90 tablet, Rfl: 3  •  multivitamin with minerals tablet tablet, Take 1 tablet by mouth Daily., Disp: , Rfl:   •  omeprazole (priLOSEC) 40 MG capsule, Take 1 capsule by mouth 2 (Two) Times a Day., Disp: 180 capsule, Rfl: 3  •  oxybutynin (DITROPAN) 5 MG tablet, Take 1 tablet by mouth Every Night., Disp: 90 tablet, Rfl: 1  •  brompheniramine-pseudoephedrine-DM 30-2-10 MG/5ML syrup, Take 5 mL by mouth 4 (Four) Times a Day As Needed for Allergies (mucous) for up to 7 days., Disp: 118 mL, Rfl: 1    Allergies:   No Known Allergies    Objective   Objective     Physical Exam:  Vital Signs:   Vitals:    09/20/22 0757   BP: 98/60   BP Location: Right arm   Patient Position: Sitting   Cuff Size: Adult   Pulse: 76   Temp: 98.7 °F (37.1 °C)   TempSrc: Infrared   SpO2: 96%   Weight: 61.3 kg (135 lb 3.2 oz)     Body mass index is 23.2 kg/m².     Physical Exam  Const: NAD, A & Ox4, Pleasant, Cooperative  Eyes: EOMI, no conjunctivitis  ENT: No nasal discharge present, neck supple  Cardiac: Regular rate and rhythm, no cyanosis  Resp: Respiratory rate within normal limits, no increased work of breathing, no audible wheezing or retractions noted  GI: No  distention or ascites  MSK: Motor and sensation grossly intact in bilateral upper extremities  Neurologic: CN II-XII grossly intact  Psych: Appropriate mood and behavior.  Skin: Warm, dry  Procedures/Radiology     Procedures  No radiology results for the last 7 days     Assessment & Plan   Assessment / Plan      Assessment/Plan:   Problems Addressed This Visit  Diagnoses and all orders for this visit:    1. Moderate COPD (chronic obstructive pulmonary disease) (HCC) (Primary)  Comments:  She will increase her Advair to 2 puffs twice daily. She will use albuterol 2 puffs as needed.      2. Hyperlipidemia, unspecified hyperlipidemia type  Comments:  She will continue taking atorvastatin 10 mg daily. We will recheck her cholesterol panel today.  Assessment & Plan:  Recheck lipid panel today, continue atorvastatin 10mg daily for secondary prevention    Orders:  -     Lipid Panel; Future  -     Comprehensive Metabolic Panel; Future    3. Personal history of tobacco use, presenting hazards to health  -     albuterol sulfate  (90 Base) MCG/ACT inhaler; Inhale 2 puffs Daily.  Dispense: 18 g; Refill: 11    4. Gastroesophageal reflux disease without esophagitis  -     omeprazole (priLOSEC) 40 MG capsule; Take 1 capsule by mouth 2 (Two) Times a Day.  Dispense: 180 capsule; Refill: 3    5. Prediabetes  Assessment & Plan:  A1c worsened to 6.5% in June, recheck today    Orders:  -     Hemoglobin A1c; Future    6. Primary hypertension  Comments:  Blood pressure is slightly low today at 98/60 mmHg. If she sees the home values start to drift a little bit lower, she will let me know and stop the amlodipine.  Assessment & Plan:  A little on the low end today, but evidently 110s/70s at home. If low at home would hold amlodipine.      7. Nasal congestion  Comments:  I will send in a prescription for Bromfed.    Other orders  -     ketoconazole (NIZORAL) 2 % shampoo; Apply  topically to the appropriate area as directed 1 (One)  Time Per Week.  Dispense: 120 mL; Refill: 2  -     brompheniramine-pseudoephedrine-DM 30-2-10 MG/5ML syrup; Take 5 mL by mouth 4 (Four) Times a Day As Needed for Allergies (mucous) for up to 7 days.  Dispense: 118 mL; Refill: 1    Problem List Items Addressed This Visit        Cardiac and Vasculature    Hyperlipidemia    Overview     Started atorvastatin 10mg June 2022         Current Assessment & Plan     Recheck lipid panel today, continue atorvastatin 10mg daily for secondary prevention         Relevant Orders    Lipid Panel    Comprehensive Metabolic Panel    Hypertension    Overview     Amlodipine 5mg daily, lisinopril-HCTZ 20-12.5mg daily         Current Assessment & Plan     A little on the low end today, but evidently 110s/70s at home. If low at home would hold amlodipine.            Endocrine and Metabolic    Prediabetes    Current Assessment & Plan     A1c worsened to 6.5% in June, recheck today         Relevant Orders    Hemoglobin A1c       Tobacco    Personal history of tobacco use, presenting hazards to health    Overview     Failed Chantix, patches (OTC only per insurance), lozenges. Currently at 1/2ppd.  Completed CT low dose 9/10/20, small 7 mm noncalcified pleural-based nodule identified within the inferior aspect of the right upper lobe. Follow-up was stable, rec routine 1 year screening.         Relevant Medications    albuterol sulfate  (90 Base) MCG/ACT inhaler      Other Visit Diagnoses     Moderate COPD (chronic obstructive pulmonary disease) (HCC)    -  Primary    She will increase her Advair to 2 puffs twice daily. She will use albuterol 2 puffs as needed.      Relevant Medications    albuterol sulfate  (90 Base) MCG/ACT inhaler    brompheniramine-pseudoephedrine-DM 30-2-10 MG/5ML syrup    Gastroesophageal reflux disease without esophagitis        Relevant Medications    omeprazole (priLOSEC) 40 MG capsule    Nasal congestion        I will send in a prescription for Bromfed.         Patient Instructions   1. Increase the Advair to 2 puffs twice daily  2. Use albuterol 2 puffs as needed through the day  3. Check cholesterol today      Follow Up:   Return in about 4 months (around 1/20/2023) for Medicare Wellness, Annual.    [facility]     DO EBONY TilleyE PC GORAN KITCHEN  [facility]  [address] [facility] [address]  Fax [phone]  Phone [phone]       Transcribed from ambient dictation for Ras Padilla DO by JOSE CLAY.  09/20/22   08:47 EDT    Patient verbalized consent to the visit recording.  I have personally performed the services described in this document as transcribed by the above individual, and it is both accurate and complete.  Ras Padilla DO  9/20/2022  09:24 EDT

## 2022-09-20 NOTE — PATIENT INSTRUCTIONS
Increase the Advair to 2 puffs twice daily  Use albuterol 2 puffs as needed through the day  Check cholesterol today

## 2022-09-20 NOTE — ASSESSMENT & PLAN NOTE
A little on the low end today, but evidently 110s/70s at home. If low at home would hold amlodipine.

## 2022-12-06 ENCOUNTER — TELEPHONE (OUTPATIENT)
Dept: FAMILY MEDICINE CLINIC | Facility: CLINIC | Age: 67
End: 2022-12-06

## 2022-12-06 NOTE — TELEPHONE ENCOUNTER
Caller: Johnna Contreras    Relationship: Self    Best call back number: [phone]    Requested Prescriptions: PATIENT IS REQUESTING  REFILL FOR ALL HER MEDICATIONS; SHE DID NOT GIVE MEDICATIONS BY NAME       [facility]arm[facility] where [facility] should be sent[facility]      [facility] [place] [address] [facility] - [phone]  - [phone] FX        Additional details provided by patient: PATIENT REQUESTING TO INFORM DR PORTILLO THAT SHE HAS STOPPED TAKING atorvastatin (LIPITOR) 10 MG tablet DUE TO SIDE EFFECTS OF MUSCLE ACHES AND HAIR LOSS.  REQUESTING AN ALTERNATIVE MEDICATION     Does the patient have less than a 3 day supply:  [x] Yes  [] No    Would you like a call back once the refill request has been completed: [x] Yes [] No    If the office needs to give you a call back, can they leave a voicemail: [] Yes [x] No    Cameron Michel Rep   12/06/22 14:54 EST

## 2022-12-07 NOTE — TELEPHONE ENCOUNTER
Spoke with pharmacy, jaqui has refills on file. Last fill was sent on 11/30. Patient aware and has enough medicine    She states she stopped taking atorvastatin 2 weeks ago due to leg muscle pain. She also reports that she has had 3 falls int he past 2 weeks. She was seen at [facility] ER last week. Stat record request has been sent.     Attempted to schedule follow up, she declined and states she has not fallen since being seen at ER.

## 2023-01-31 ENCOUNTER — OFFICE VISIT (OUTPATIENT)
Dept: FAMILY MEDICINE CLINIC | Facility: CLINIC | Age: 68
End: 2023-01-31
Payer: MEDICARE

## 2023-01-31 ENCOUNTER — LAB (OUTPATIENT)
Dept: LAB | Facility: HOSPITAL | Age: 68
End: 2023-01-31
Payer: MEDICARE

## 2023-01-31 VITALS
TEMPERATURE: 100.5 F | HEART RATE: 91 BPM | DIASTOLIC BLOOD PRESSURE: 90 MMHG | OXYGEN SATURATION: 95 % | SYSTOLIC BLOOD PRESSURE: 140 MMHG

## 2023-01-31 DIAGNOSIS — L65.9 THINNING HAIR: ICD-10-CM

## 2023-01-31 DIAGNOSIS — E78.5 HYPERLIPIDEMIA, UNSPECIFIED HYPERLIPIDEMIA TYPE: ICD-10-CM

## 2023-01-31 DIAGNOSIS — I10 PRIMARY HYPERTENSION: ICD-10-CM

## 2023-01-31 DIAGNOSIS — Z00.00 PREVENTATIVE HEALTH CARE: ICD-10-CM

## 2023-01-31 DIAGNOSIS — Z91.81 AT HIGH RISK FOR FALLS: ICD-10-CM

## 2023-01-31 DIAGNOSIS — Z13.29 SCREENING FOR ENDOCRINE DISORDER: ICD-10-CM

## 2023-01-31 DIAGNOSIS — R73.03 PREDIABETES: ICD-10-CM

## 2023-01-31 DIAGNOSIS — Z23 IMMUNIZATION DUE: ICD-10-CM

## 2023-01-31 DIAGNOSIS — Z00.00 MEDICARE ANNUAL WELLNESS VISIT, SUBSEQUENT: Primary | ICD-10-CM

## 2023-01-31 DIAGNOSIS — Z13.0 SCREENING FOR DEFICIENCY ANEMIA: ICD-10-CM

## 2023-01-31 DIAGNOSIS — Z87.891 PERSONAL HISTORY OF TOBACCO USE, PRESENTING HAZARDS TO HEALTH: ICD-10-CM

## 2023-01-31 DIAGNOSIS — J44.9 MODERATE COPD (CHRONIC OBSTRUCTIVE PULMONARY DISEASE): ICD-10-CM

## 2023-01-31 LAB
ALBUMIN SERPL-MCNC: 3.7 G/DL (ref 3.5–5.2)
ALBUMIN/GLOB SERPL: 1.1 G/DL
ALP SERPL-CCNC: 92 U/L (ref 39–117)
ALT SERPL W P-5'-P-CCNC: 13 U/L (ref 1–33)
ANION GAP SERPL CALCULATED.3IONS-SCNC: 8.4 MMOL/L (ref 5–15)
AST SERPL-CCNC: 21 U/L (ref 1–32)
BILIRUB SERPL-MCNC: 0.2 MG/DL (ref 0–1.2)
BUN SERPL-MCNC: 10 MG/DL (ref 8–23)
BUN/CREAT SERPL: 14.7 (ref 7–25)
CALCIUM SPEC-SCNC: 9.4 MG/DL (ref 8.6–10.5)
CHLORIDE SERPL-SCNC: 99 MMOL/L (ref 98–107)
CHOLEST SERPL-MCNC: 173 MG/DL (ref 0–200)
CO2 SERPL-SCNC: 25.6 MMOL/L (ref 22–29)
CREAT SERPL-MCNC: 0.68 MG/DL (ref 0.57–1)
DEPRECATED RDW RBC AUTO: 39.6 FL (ref 37–54)
EGFRCR SERPLBLD CKD-EPI 2021: 96.2 ML/MIN/1.73
ERYTHROCYTE [DISTWIDTH] IN BLOOD BY AUTOMATED COUNT: 12.2 % (ref 12.3–15.4)
FERRITIN SERPL-MCNC: 42.3 NG/ML (ref 13–150)
GLOBULIN UR ELPH-MCNC: 3.4 GM/DL
GLUCOSE SERPL-MCNC: 101 MG/DL (ref 65–99)
HBA1C MFR BLD: 6.1 % (ref 4.8–5.6)
HCT VFR BLD AUTO: 39.5 % (ref 34–46.6)
HDLC SERPL-MCNC: 65 MG/DL (ref 40–60)
HGB BLD-MCNC: 13.3 G/DL (ref 12–15.9)
LDLC SERPL CALC-MCNC: 99 MG/DL (ref 0–100)
LDLC/HDLC SERPL: 1.52 {RATIO}
MCH RBC QN AUTO: 30.2 PG (ref 26.6–33)
MCHC RBC AUTO-ENTMCNC: 33.7 G/DL (ref 31.5–35.7)
MCV RBC AUTO: 89.6 FL (ref 79–97)
PLATELET # BLD AUTO: 267 10*3/MM3 (ref 140–450)
PMV BLD AUTO: 11.7 FL (ref 6–12)
POTASSIUM SERPL-SCNC: 3.9 MMOL/L (ref 3.5–5.2)
PROT SERPL-MCNC: 7.1 G/DL (ref 6–8.5)
RBC # BLD AUTO: 4.41 10*6/MM3 (ref 3.77–5.28)
SODIUM SERPL-SCNC: 133 MMOL/L (ref 136–145)
TRIGL SERPL-MCNC: 45 MG/DL (ref 0–150)
TSH SERPL DL<=0.05 MIU/L-ACNC: 1.32 UIU/ML (ref 0.27–4.2)
VIT B12 BLD-MCNC: 651 PG/ML (ref 211–946)
VLDLC SERPL-MCNC: 9 MG/DL (ref 5–40)
WBC NRBC COR # BLD: 7.32 10*3/MM3 (ref 3.4–10.8)

## 2023-01-31 PROCEDURE — 80053 COMPREHEN METABOLIC PANEL: CPT | Performed by: FAMILY MEDICINE

## 2023-01-31 PROCEDURE — 82607 VITAMIN B-12: CPT | Performed by: FAMILY MEDICINE

## 2023-01-31 PROCEDURE — 84591 ASSAY OF NOS VITAMIN: CPT | Performed by: FAMILY MEDICINE

## 2023-01-31 PROCEDURE — 1170F FXNL STATUS ASSESSED: CPT | Performed by: FAMILY MEDICINE

## 2023-01-31 PROCEDURE — 85027 COMPLETE CBC AUTOMATED: CPT | Performed by: FAMILY MEDICINE

## 2023-01-31 PROCEDURE — G0439 PPPS, SUBSEQ VISIT: HCPCS | Performed by: FAMILY MEDICINE

## 2023-01-31 PROCEDURE — 90662 IIV NO PRSV INCREASED AG IM: CPT | Performed by: FAMILY MEDICINE

## 2023-01-31 PROCEDURE — 90677 PCV20 VACCINE IM: CPT | Performed by: FAMILY MEDICINE

## 2023-01-31 PROCEDURE — 80061 LIPID PANEL: CPT | Performed by: FAMILY MEDICINE

## 2023-01-31 PROCEDURE — 1160F RVW MEDS BY RX/DR IN RCRD: CPT | Performed by: FAMILY MEDICINE

## 2023-01-31 PROCEDURE — 1159F MED LIST DOCD IN RCRD: CPT | Performed by: FAMILY MEDICINE

## 2023-01-31 PROCEDURE — G0008 ADMIN INFLUENZA VIRUS VAC: HCPCS | Performed by: FAMILY MEDICINE

## 2023-01-31 PROCEDURE — 82728 ASSAY OF FERRITIN: CPT | Performed by: FAMILY MEDICINE

## 2023-01-31 PROCEDURE — 84443 ASSAY THYROID STIM HORMONE: CPT | Performed by: FAMILY MEDICINE

## 2023-01-31 PROCEDURE — 99397 PER PM REEVAL EST PAT 65+ YR: CPT | Performed by: FAMILY MEDICINE

## 2023-01-31 PROCEDURE — 83036 HEMOGLOBIN GLYCOSYLATED A1C: CPT | Performed by: FAMILY MEDICINE

## 2023-01-31 PROCEDURE — G0009 ADMIN PNEUMOCOCCAL VACCINE: HCPCS | Performed by: FAMILY MEDICINE

## 2023-02-07 LAB — BIOTIN SERPL-MCNC: 0.23 NG/ML (ref 0.05–0.83)

## 2023-02-23 ENCOUNTER — TELEPHONE (OUTPATIENT)
Dept: FAMILY MEDICINE CLINIC | Facility: CLINIC | Age: 68
End: 2023-02-23
Payer: MEDICARE

## 2023-02-23 NOTE — TELEPHONE ENCOUNTER
Caller: Johnna Contreras    Relationship: Self    Best call back number: [phone]      What was the call regarding: PATIENT WAS DISCHARGED YESTERDAY 2.22, WANTS TO KNOW IF SHE SHOULD KEEP HER UPCOMING APPOINTMENTS BEFORE SEEING DR PORTILLO.    Do you require a callback: AS SOON AS POSSIBLE.

## 2023-02-27 RX ORDER — OXYBUTYNIN CHLORIDE 5 MG/1
TABLET ORAL
Qty: 90 TABLET | Refills: 1 | Status: SHIPPED | OUTPATIENT
Start: 2023-02-27

## 2023-02-27 RX ORDER — BACLOFEN 20 MG/1
TABLET ORAL
Qty: 360 TABLET | Refills: 1 | Status: SHIPPED | OUTPATIENT
Start: 2023-02-27

## 2023-02-28 ENCOUNTER — OFFICE VISIT (OUTPATIENT)
Dept: FAMILY MEDICINE CLINIC | Facility: CLINIC | Age: 68
End: 2023-02-28
Payer: MEDICARE

## 2023-02-28 VITALS
WEIGHT: 131.4 LBS | DIASTOLIC BLOOD PRESSURE: 78 MMHG | TEMPERATURE: 97.7 F | BODY MASS INDEX: 22.54 KG/M2 | SYSTOLIC BLOOD PRESSURE: 110 MMHG

## 2023-02-28 DIAGNOSIS — I10 PRIMARY HYPERTENSION: ICD-10-CM

## 2023-02-28 DIAGNOSIS — E87.1 HYPONATREMIA: ICD-10-CM

## 2023-02-28 DIAGNOSIS — E78.5 HYPERLIPIDEMIA, UNSPECIFIED HYPERLIPIDEMIA TYPE: ICD-10-CM

## 2023-02-28 DIAGNOSIS — J44.9 MODERATE COPD (CHRONIC OBSTRUCTIVE PULMONARY DISEASE): Primary | ICD-10-CM

## 2023-02-28 DIAGNOSIS — Z09 HOSPITAL DISCHARGE FOLLOW-UP: ICD-10-CM

## 2023-02-28 PROCEDURE — 99495 TRANSJ CARE MGMT MOD F2F 14D: CPT | Performed by: FAMILY MEDICINE

## 2023-02-28 PROCEDURE — 1111F DSCHRG MED/CURRENT MED MERGE: CPT | Performed by: FAMILY MEDICINE

## 2023-02-28 RX ORDER — LISINOPRIL 5 MG/1
5 TABLET ORAL DAILY
Qty: 90 TABLET | Refills: 3 | Status: SHIPPED | OUTPATIENT
Start: 2023-02-28 | End: 2023-03-09 | Stop reason: SDUPTHER

## 2023-02-28 RX ORDER — ATORVASTATIN CALCIUM 20 MG/1
20 TABLET, FILM COATED ORAL DAILY
Qty: 90 TABLET | Refills: 3 | Status: SHIPPED | OUTPATIENT
Start: 2023-02-28

## 2023-02-28 NOTE — PROGRESS NOTES
Hospital Follow-Up/Transition of Care Visit     Patient Name: Johnna Contreras  : 1955   MRN: [id number]   Care Team: Patient Care Team:  Rsa Padilla DO as PCP - General (Family Medicine)    Chief Complaint:    Chief Complaint   Patient presents with   • Follow-up     Hospital follow-up, pt states her Na was low       History of Present Illness: Johnna Contreras is a 67 y.o. female who presents today for TCM visit.    Within 48 business hours after discharge our office contacted her via telephone to coordinate her care and needs.      I reviewed and discussed the details of that call along with the discharge summary, hospital problems, inpatient lab results, inpatient diagnostic studies, and consultation reports with Johnna.      Current outpatient and discharge medications have been reconciled for the patient.  Reviewed by: Ras Padilla DO    Date of admission: 23 at [facility]  Date of Discharge: 23  Diagnosis: Acute on chronic hypercapneic resp failure; AMS    Date of admission 23 at [facility]  Date of discharge: 23  Diagnosis: Hyponatremia  No flowsheet data found.  Risk for Readmission (LACE) No data recorded    History of Present Illness   Course During Hospital Stay:    Hospital stay #1:  Johnna Contreras is a 67 y.o. female presenting with mental status. Patient to apparently felt bad was brought in by her  Andrew from home is currently not at the bedside she had very decreased mental status when she was evaluated by the ER. Apparently they had difficulty getting some urine so they ended up straight cathing her and got 900 mL out and they waited some period of time is unclear how long it was an hour or so they bladder scanned her and she had 819 out and they placed a Farris catheter at that point her mental status started to improve and her blood pressure came down. When I evaluated her she stated she came in because she had decreased mental status. Initially no one was  at the bedside were able to up pretty full conversation given my low expectations but she states she has been feeling short of breath for the past 4 or 5 days much more so than usual she continues to smoke and is not on any oxygen but she states she does have a history of COPD. She was on oxygen satting about 92% when I saw her on 4 L and so I took the oxygen off and her sats dropped down to 80% on room air and her respiratory rate at that time was 35. She did complain of some wheezing as well as coughing. GEN level improved when placed on 2 L nasal cannula. He denied any fevers or chills this afternoon she states she chronically has little bit of a headache. She denied any chest pain or palpitations. She complained of some nausea but no vomiting no diarrhea constipation she does have some urinary bladder spasm issues for which she states that she takes oxybutynin. She also complains of some anxiety. 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  Patient respiratory status continued to improve with IV steroids, breathing treatment and oxygen support in addition to IV antibiotics, patient did finish her IV antibiotics, IV steroids switched to p.o., patient is still requiring 2 L oxygen support, patient chronic smoker, patient counseled to quit smoking, patient will be discharged on home oxygen with home health to follow-up with primary care physician closely, patient was hemodynamically stable at time of discharge.    Hospital stay #2:  #Leukocytosis of unknown origin- Resolved  -WBC 14.0 with left shift, negative lactic acid, generalized fatigue/weakness.  -No obvious source for bacterial infection, could be viral?  -Blood cultures ordered.  -Urine legionella/strep pneumo Ags.  -PNA/respiratory PCR panel.neg  -Empiric antibiotic  treatment with Rocephin 1g daily.  Deescalate pending clinical course.  -Check procalctionin.     #Electrolyte disturbances  #Hyponatremia-resolved  -Etiology not clear, could be from dehydration or underlying kidney disease that has not yet been diagnosed.  -Continue NS at 100cc/h x1 L.  -Recheck BMP.  -Check electrolytes and replete as needed.  -Replace magnesium with 1 g magnesium sulfate IVPB x1 dose.  -Consult to Nephrology/following     #Acute-on-chronic hypoxic respiratory failure  #COPD without acute exacerbation  #Emphysema  -Duonebs Q6h WA and PRN.  -Pulmicort nebs BID.  -Oxygen supplementation as needed.  -May use NIPPV for increased WOB or refractory hypoxia.  -Patient may need to be evaluated for oxygen at home, this might decrease readmissions.  I will consult case management for further options.  -Consider Inpatient Consult to Pulmonary/CCM if patient develops refractory hypoxemia.  Otherwise, would recommend establishing care on an outpatient basis.     #HTN  -Continue home medications.  -PRN antihypertensives.     #HLD  -Check lipid panel.  -Add atorvastatin 40mg daily.     #Hyperammonemia with constipation  #Mentation status baseline not known  -Add lactulose 20g BID x3 days.  -Check ammonia level in AM.     Glycemic control:  Keep BS between 110-180     GI prophylaxis:   Protonix  VTE prophylaxis:   Heparin SQ  Reports old bruise/3 weeks old on left inner thigh.      Status Since Discharge:  Smoked a very little bit, hasn't been home much. Night of initial DC had 2 and 1 the next morning before returning to hospital; After DC last night she smoked 3 cigarettes, 3 this morning. She plans to put the patches on at 9pm similar to how the hospital was doing it.    They had discharged her on NaCl 1g TID for 10 days, I'm not sure I'd like her on salt tabs. No evidence for use, and she has been taken off diuretic now. She has a bit of chronic hyponatremia. Notes say started on atorva 40 but discharged on  20mg. Ok to start lower.     The following portions of the patient's history were reviewed and updated as appropriate: allergies, current medications, past family history, past medical history, past social history, past surgical history and problem list.    This patient is accompanied by their self who contributes to the history of their care.    The following portions of the patient's history were reviewed and updated as appropriate: allergies, current medications, past family history, past medical history, past social history, past surgical history and problem list.    Subjective      Review of Systems:   Review of Systems - See HPI    Past Medical History:   Past Medical History:   Diagnosis Date   • Back problem    • Bronchitis    • Claustrophobia    • GERD (gastroesophageal reflux disease)    • Headache    • Hyperlipidemia    • Hypertension    • Seizures (HCC)     last seizure at the age of 21   • Stroke (HCC)        Past Surgical History:   Past Surgical History:   Procedure Laterality Date   • DILATATION AND CURETTAGE     • EYE SURGERY     • TONSILLECTOMY     • TUBAL ABDOMINAL LIGATION         Family History:   Family History   Problem Relation Age of Onset   • Heart attack Mother    • Stroke Mother    • Hypertension Mother    • Hyperlipidemia Mother    • Diabetes Father    • Breast cancer Paternal Grandmother    • Cancer Paternal Grandmother    • Stroke Paternal Grandmother    • Heart attack Paternal Grandmother    • Ovarian cancer Neg Hx        Social History:   Social History     Socioeconomic History   • Marital status:    Tobacco Use   • Smoking status: Every Day     Packs/day: 1.00     Years: 50.00     Pack years: 50.00     Types: Cigarettes   • Smokeless tobacco: Never   Vaping Use   • Vaping Use: Never used   Substance and Sexual Activity   • Alcohol use: Yes     Comment: rarely    • Drug use: No   • Sexual activity: Defer       Tobacco History:   Social History     Tobacco Use   Smoking Status  Every Day   • Packs/day: 1.00   • Years: 50.00   • Pack years: 50.00   • Types: Cigarettes   Smokeless Tobacco Never       Medications:     Current Outpatient Medications:   •  albuterol sulfate  (90 Base) MCG/ACT inhaler, Inhale 2 puffs Daily., Disp: 18 g, Rfl: 11  •  amLODIPine (NORVASC) 5 MG tablet, Take 1 tablet by mouth Daily., Disp: 90 tablet, Rfl: 3  •  aspirin 81 MG tablet, Take 1 tablet by mouth Daily., Disp: , Rfl:   •  baclofen (LIORESAL) 20 MG tablet, TAKE 1 TABLET 4 TIMES DAILY, Disp: 360 tablet, Rfl: 1  •  Calcium-Magnesium-Vitamin D (CALCIUM 1200+D3 PO), Take  by mouth., Disp: , Rfl:   •  citalopram (CeleXA) 40 MG tablet, Take 1 tablet by mouth Daily., Disp: 90 tablet, Rfl: 3  •  Fexofenadine HCl (ALLERGY 24-HR PO), Take  by mouth., Disp: , Rfl:   •  fluticasone (FLONASE) 50 MCG/ACT nasal spray, 1 spray into the nostril(s) as directed by provider Daily., Disp: 32 g, Rfl: 0  •  hydrocortisone (ANUSOL-HC) 2.5 % rectal cream, Insert  into the rectum 2 (Two) Times a Day., Disp: , Rfl:   •  ibuprofen (ADVIL,MOTRIN) 200 MG tablet, Take 1 tablet by mouth Every 6 (Six) Hours As Needed for Mild Pain., Disp: 90 tablet, Rfl: 2  •  IncobotulinumtoxinA 100 units reconstituted solution, Inject 400 Units into the appropriate muscle as directed by prescriber Every 3 (Three) Months., Disp: , Rfl:   •  ketoconazole (NIZORAL) 2 % shampoo, Apply  topically to the appropriate area as directed 1 (One) Time Per Week., Disp: 120 mL, Rfl: 2  •  multivitamin with minerals tablet tablet, Take 1 tablet by mouth Daily., Disp: , Rfl:   •  omeprazole (priLOSEC) 40 MG capsule, Take 1 capsule by mouth 2 (Two) Times a Day., Disp: 180 capsule, Rfl: 3  •  oxybutynin (DITROPAN) 5 MG tablet, TAKE 1 TABLET EVERY NIGHT, Disp: 90 tablet, Rfl: 1  •  atorvastatin (LIPITOR) 20 MG tablet, Take 1 tablet by mouth Daily., Disp: 90 tablet, Rfl: 3  •  lisinopril (PRINIVIL,ZESTRIL) 5 MG tablet, Take 1 tablet by mouth Daily., Disp: 90 tablet,  Rfl: 3    Allergies:   Allergies   Allergen Reactions   • Atorvastatin Myalgia       Objective   Objective     Physical Exam:  Vital Signs:   Vitals:    02/28/23 0949   BP: 110/78   BP Location: Right arm   Patient Position: Sitting   Cuff Size: Adult   Temp: 97.7 °F (36.5 °C)   TempSrc: Infrared   Weight: 59.6 kg (131 lb 6.4 oz)     Body mass index is 22.54 kg/m².     Physical Exam  Constitutional:       Appearance: Normal appearance.   Pulmonary:      Effort: Pulmonary effort is normal.   Musculoskeletal:      Comments: Chronically in power wheelchair   Neurological:      Mental Status: She is alert.       Nursing note reviewed  Procedures/Radiology     Procedures  CT chest without IV contrast    Result Date: 2/23/2023  Dependent atelectasis. Otherwise, no acute disease.. Images reviewed, interpreted and dictated by Dr. Abhishek Mills MD    XR chest 2 views    Result Date: 2/23/2023  Emphysema without acute cardiopulmonary process. Images reviewed, interpreted, and dictated by Dr. Emerita Morin. Transcribed by Debby Holley PA-C.       Assessment & Plan   Assessment / Plan      Assessment/Plan:   Problems Addressed This Visit  Diagnoses and all orders for this visit:    1. Moderate COPD (chronic obstructive pulmonary disease) (HCC) (Primary)    2. Hospital discharge follow-up    3. Hyponatremia  -     Basic Metabolic Panel; Future    4. Primary hypertension  -     lisinopril (PRINIVIL,ZESTRIL) 5 MG tablet; Take 1 tablet by mouth Daily.  Dispense: 90 tablet; Refill: 3    5. Hyperlipidemia, unspecified hyperlipidemia type  Assessment & Plan:  Atorvastatin 20 mg daily for secondary prevention    Orders:  -     atorvastatin (LIPITOR) 20 MG tablet; Take 1 tablet by mouth Daily.  Dispense: 90 tablet; Refill: 3    Problem List Items Addressed This Visit        Cardiac and Vasculature    Hyperlipidemia    Overview     Started atorvastatin 10mg June 2022.  The notes from the hospital at [facility] report for her to start  40 mg daily, but she was discharged on 20 mg daily.  I think it is reasonable to start at a lower dose, refill provided for the atorvastatin 20 mg.         Current Assessment & Plan     Atorvastatin 20 mg daily for secondary prevention         Relevant Medications    atorvastatin (LIPITOR) 20 MG tablet    Hypertension    Overview     Amlodipine 5mg daily, lisinopril 5mg (changed from lisinopril-HCTZ 20-12.5 inpatient 2/2023 for hyponatremia)         Relevant Medications    lisinopril (PRINIVIL,ZESTRIL) 5 MG tablet       Pulmonary and Pneumonias    Moderate COPD (chronic obstructive pulmonary disease) (HCC) - Primary    Overview     Mod obstruction w/o response to bronchodilator on PFT 2/2022        Other Visit Diagnoses     Hospital discharge follow-up        Hyponatremia        Relevant Orders    Basic Metabolic Panel        #1 hyponatremia  She was discontinued off of her lisinopril dose HCTZ in the hospital which is certainly appropriate.  She has a little bit of chronic hyponatremia, and this may very well be enough to resolve her slight hyponatremia.  I would recommend holding off on the salt tablets for right now.  We will recheck her sodium levels in 2 weeks.  Her mental status is at baseline today.        See patient diagnoses and orders along with patient instructions for assessment, plan, and changes to care for patient.    Patient Instructions   1. Recheck sodium level at lab in 2 weeks  2. If sodium level is still low we might need salt tablets      Follow Up:   Return if symptoms worsen or fail to improve.      MDM     [facility] [address]  [facility]  [address]  [facility] [address]  Fax [phone]  Phone [phone]

## 2023-03-06 ENCOUNTER — TRANSCRIBE ORDERS (OUTPATIENT)
Dept: LAB | Facility: HOSPITAL | Age: 68
End: 2023-03-06
Payer: MEDICARE

## 2023-03-06 ENCOUNTER — LAB (OUTPATIENT)
Dept: LAB | Facility: HOSPITAL | Age: 68
End: 2023-03-06
Payer: MEDICARE

## 2023-03-06 DIAGNOSIS — E87.1 HYPONATREMIA: ICD-10-CM

## 2023-03-06 DIAGNOSIS — E87.1 HYPONATREMIA: Primary | ICD-10-CM

## 2023-03-06 PROCEDURE — 36415 COLL VENOUS BLD VENIPUNCTURE: CPT

## 2023-03-06 PROCEDURE — 80048 BASIC METABOLIC PNL TOTAL CA: CPT

## 2023-03-07 ENCOUNTER — TELEPHONE (OUTPATIENT)
Dept: FAMILY MEDICINE CLINIC | Facility: CLINIC | Age: 68
End: 2023-03-07
Payer: MEDICARE

## 2023-03-07 DIAGNOSIS — J44.9 MODERATE COPD (CHRONIC OBSTRUCTIVE PULMONARY DISEASE): Primary | ICD-10-CM

## 2023-03-07 LAB
ANION GAP SERPL CALCULATED.3IONS-SCNC: 12.7 MMOL/L (ref 5–15)
BUN SERPL-MCNC: 11 MG/DL (ref 8–23)
BUN/CREAT SERPL: 16.7 (ref 7–25)
CALCIUM SPEC-SCNC: 9.7 MG/DL (ref 8.6–10.5)
CHLORIDE SERPL-SCNC: 94 MMOL/L (ref 98–107)
CO2 SERPL-SCNC: 23.3 MMOL/L (ref 22–29)
CREAT SERPL-MCNC: 0.66 MG/DL (ref 0.57–1)
EGFRCR SERPLBLD CKD-EPI 2021: 96.9 ML/MIN/1.73
GLUCOSE SERPL-MCNC: 79 MG/DL (ref 65–99)
POTASSIUM SERPL-SCNC: 3.8 MMOL/L (ref 3.5–5.2)
SODIUM SERPL-SCNC: 130 MMOL/L (ref 136–145)

## 2023-03-07 RX ORDER — IPRATROPIUM BROMIDE AND ALBUTEROL SULFATE 2.5; .5 MG/3ML; MG/3ML
3 SOLUTION RESPIRATORY (INHALATION) EVERY 4 HOURS PRN
Qty: 360 ML | Refills: 11 | Status: SHIPPED | OUTPATIENT
Start: 2023-03-07

## 2023-03-07 RX ORDER — GUAIFENESIN 600 MG/1
1200 TABLET, EXTENDED RELEASE ORAL 2 TIMES DAILY
Qty: 60 TABLET | Refills: 2 | Status: SHIPPED | OUTPATIENT
Start: 2023-03-07

## 2023-03-07 NOTE — TELEPHONE ENCOUNTER
Caller: [facility]    Relationship:  HOME HEALTH NURSE    Best call back number: [phone]    What medication are you requesting: DUO NEB AND NEBULIZER MACHINE, MUCINEX    What are your current symptoms: COPD EXACERBATION     Have you had these symptoms before:    [x] Yes  [] No    Have you been treated for these symptoms before:   [x] Yes  [] No    If a prescription is needed, what is your preferred pharmacy and phone number:    [facility] [id number], located within [facility] [address] [place] [address] [phone] [facility] [phone] FX     Additional notes: [facility] OXYGEN OR ABLE CARE ARE THE PLACES THE NEBULIZER MACHINE CAN BE SENT INTO. THE SOLUTION AN MUCINEX SHOULD BE SENT TO THE [facility].     PATIENT WAS DISCHARGED FROM [facility] AND WAS SENT HOME WITHOUT THESE PRESCRIPTIONS. MANOLO IS NEEDING DR. PORTILLO TO SEND THESE IN FOR HER COPD EXACERBATION.

## 2023-03-07 NOTE — TELEPHONE ENCOUNTER
Attempted to contact patient, no answer. Unable to leave voicemail       Notified HH nurse     Nebulizer supplies has been sent to [facility]

## 2023-03-09 ENCOUNTER — TELEPHONE (OUTPATIENT)
Dept: FAMILY MEDICINE CLINIC | Facility: CLINIC | Age: 68
End: 2023-03-09
Payer: MEDICARE

## 2023-03-09 DIAGNOSIS — I10 PRIMARY HYPERTENSION: ICD-10-CM

## 2023-03-09 RX ORDER — LISINOPRIL 5 MG/1
5 TABLET ORAL DAILY
Qty: 90 TABLET | Refills: 3 | Status: SHIPPED | OUTPATIENT
Start: 2023-03-09

## 2023-03-09 NOTE — TELEPHONE ENCOUNTER
Caller: Johnna Contreras    Relationship: Self    Best call back number:[phone]    Requested Prescriptions:   Requested Prescriptions     Pending Prescriptions Disp Refills   • lisinopril (PRINIVIL,ZESTRIL) 5 MG tablet 90 tablet 3     Sig: Take 1 tablet by mouth Daily.        Pharmacy where request should be sent: [facility] [id number] [address] [phone] [facility] [phone] FX     Additional details provided by patient: PATIENT NEEDING REFILL    Does the patient have less than a 3 day supply:  [x] Yes  [] No    Would you like a call back once the refill request has been completed: [] Yes [] No    If the office needs to give you a call back, can they leave a voicemail: [] Yes [] No    Cameron Vergara Rep   03/09/23 14:18 EST

## 2023-03-09 NOTE — TELEPHONE ENCOUNTER
Yudith with [facility] called and requested verbal orders for OT Evaluation for patient next week. Pt. Had an appt today, but canceled it. [facility] needs orders for new appt. Please call Yudith @ [phone]

## 2023-03-10 ENCOUNTER — TELEPHONE (OUTPATIENT)
Dept: FAMILY MEDICINE CLINIC | Facility: CLINIC | Age: 68
End: 2023-03-10
Payer: MEDICARE

## 2023-03-10 DIAGNOSIS — J96.21 ACUTE ON CHRONIC RESPIRATORY FAILURE WITH HYPOXEMIA: ICD-10-CM

## 2023-03-10 DIAGNOSIS — J44.9 MODERATE COPD (CHRONIC OBSTRUCTIVE PULMONARY DISEASE): Primary | ICD-10-CM

## 2023-03-10 NOTE — TELEPHONE ENCOUNTER
Oxygen order and supporting documents faxed to [facility],  nurse notified.     Attempted to contact, no answer. Unable to leave voicemail

## 2023-03-10 NOTE — TELEPHONE ENCOUNTER
Caller: ESPERANZA    Relationship: HOME HEALTH    Best call back number: [phone]      What was the call regarding: PATIENTS OXYGEN IS 88%. NEEDS PRN OXYGEN DELIVERED TO HER HOME. PATIENT IS A SMOKER.     Do you require a callback: NO

## 2023-03-14 ENCOUNTER — PRIOR AUTHORIZATION (OUTPATIENT)
Dept: FAMILY MEDICINE CLINIC | Facility: CLINIC | Age: 68
End: 2023-03-14
Payer: MEDICARE

## 2023-03-14 ENCOUNTER — TELEPHONE (OUTPATIENT)
Dept: FAMILY MEDICINE CLINIC | Facility: CLINIC | Age: 68
End: 2023-03-14
Payer: MEDICARE

## 2023-03-14 NOTE — TELEPHONE ENCOUNTER
Caller: [facility] - [place], [place] - [address] - [phone] [facility] [phone] FX    Relationship: Pharmacy    Best call back number: [phone]    What medications are you currently taking:   Current Outpatient Medications on File Prior to Visit   Medication Sig Dispense Refill   • albuterol sulfate  (90 Base) MCG/ACT inhaler Inhale 2 puffs Daily. 18 g 11   • amLODIPine (NORVASC) 5 MG tablet Take 1 tablet by mouth Daily. 90 tablet 3   • aspirin 81 MG tablet Take 1 tablet by mouth Daily.     • atorvastatin (LIPITOR) 20 MG tablet Take 1 tablet by mouth Daily. 90 tablet 3   • baclofen (LIORESAL) 20 MG tablet TAKE 1 TABLET 4 TIMES DAILY 360 tablet 1   • Calcium-Magnesium-Vitamin D (CALCIUM 1200+D3 PO) Take  by mouth.     • citalopram (CeleXA) 40 MG tablet Take 1 tablet by mouth Daily. 90 tablet 3   • Fexofenadine HCl (ALLERGY 24-HR PO) Take  by mouth.     • fluticasone (FLONASE) 50 MCG/ACT nasal spray 1 spray into the nostril(s) as directed by provider Daily. 32 g 0   • guaiFENesin (Mucinex) 600 MG 12 hr tablet Take 2 tablets by mouth 2 (Two) Times a Day. 60 tablet 2   • hydrocortisone (ANUSOL-HC) 2.5 % rectal cream Insert  into the rectum 2 (Two) Times a Day.     • ibuprofen (ADVIL,MOTRIN) 200 MG tablet Take 1 tablet by mouth Every 6 (Six) Hours As Needed for Mild Pain. 90 tablet 2   • IncobotulinumtoxinA 100 units reconstituted solution Inject 400 Units into the appropriate muscle as directed by prescriber Every 3 (Three) Months.     • ipratropium-albuterol (DUO-NEB) 0.5-2.5 mg/3 ml nebulizer Take 3 mL by nebulization Every 4 (Four) Hours As Needed for Wheezing. 360 mL 11   • ketoconazole (NIZORAL) 2 % shampoo Apply  topically to the appropriate area as directed 1 (One) Time Per Week. 120 mL 2   • lisinopril (PRINIVIL,ZESTRIL) 5 MG tablet Take 1 tablet by mouth Daily. 90 tablet 3   • multivitamin with minerals tablet tablet Take 1 tablet by mouth Daily.     • omeprazole (priLOSEC) 40 MG  capsule Take 1 capsule by mouth 2 (Two) Times a Day. 180 capsule 3   • oxybutynin (DITROPAN) 5 MG tablet TAKE 1 TABLET EVERY NIGHT 90 tablet 1     No current facility-administered medications on file prior to visit.          When did you start taking these medications:     Which medication are you concerned about: MUCUS RELIEF ER 600MG TABLET     Who prescribed you this medication:     What are your concerns: PATIENTS PHARMACY'S PRIOR AUTHORIZATION DEPARTMENT IS CALLING AND STATES THAT THEY HAVE RECEIVED A PRIOR AUTHORIZATION FOR THIS MEDICATION THAT IS REQUIRING ADDITIONAL INFORMATION.     How long have you had these concerns:

## 2023-03-15 ENCOUNTER — TELEPHONE (OUTPATIENT)
Dept: FAMILY MEDICINE CLINIC | Facility: CLINIC | Age: 68
End: 2023-03-15
Payer: MEDICARE

## 2023-03-15 NOTE — TELEPHONE ENCOUNTER
Dianna from home health calling needing verbal orders for occupational therapy evaluation her best call back number Is  [phone]

## 2023-03-20 ENCOUNTER — TELEPHONE (OUTPATIENT)
Dept: FAMILY MEDICINE CLINIC | Facility: CLINIC | Age: 68
End: 2023-03-20
Payer: MEDICARE

## 2023-03-20 DIAGNOSIS — Z91.010 PEANUT ALLERGY: Primary | ICD-10-CM

## 2023-03-20 DIAGNOSIS — J44.9 MODERATE COPD (CHRONIC OBSTRUCTIVE PULMONARY DISEASE): ICD-10-CM

## 2023-03-20 RX ORDER — AZITHROMYCIN 250 MG/1
TABLET, FILM COATED ORAL
Qty: 6 TABLET | Refills: 0 | Status: SHIPPED | OUTPATIENT
Start: 2023-03-20 | End: 2023-03-25

## 2023-03-20 RX ORDER — EPINEPHRINE 0.3 MG/.3ML
0.3 INJECTION SUBCUTANEOUS ONCE
Qty: 1 EACH | Refills: 0 | Status: SHIPPED | OUTPATIENT
Start: 2023-03-20 | End: 2023-03-20

## 2023-03-20 RX ORDER — PREDNISONE 20 MG/1
TABLET ORAL
Qty: 12 TABLET | Refills: 0 | Status: SHIPPED | OUTPATIENT
Start: 2023-03-20 | End: 2023-03-26

## 2023-03-20 NOTE — TELEPHONE ENCOUNTER
Caller: MILEY    Relationship: Home Health    Best call back number:[phone]    What is the best time to reach you:     Who are you requesting to speak with (clinical staff, provider,  specific staff member): DR PORTILLO    Do you know the name of the person who called: MILEY    What was the call regarding: PATIENT DOES NOT HAVE AN EPIPEN AND HAS TREE NUT ALLERGY AND PEANUT ALLERGY AND MILEY IS REQUESTING PATIENT HAVE AN EPIPEN ALSO PATIENT IS WHEEZING THROUGHOUT AND IS REQUESTING PREDNISONE AND AN ANTIBIOTIC    Do you require a callback: YES

## 2023-03-20 NOTE — TELEPHONE ENCOUNTER
Attempted to contact patient, no answer. Left voicemail for patient to call the office back (office # given).     Hub may relay message, and schedule appointment.    Please notify pt that she needs an appt to be evaluated for wheezing. If she is having any chest pain or shortness of breath she needs to go to the ER. If she declines having chest pain/shortness of breath & PCP does not have any availability please check same day availability for other providers in the office.

## 2023-03-21 DIAGNOSIS — Z00.6 EXAMINATION FOR NORMAL COMPARISON OR CONTROL IN CLINICAL RESEARCH: Primary | ICD-10-CM

## 2023-03-24 ENCOUNTER — TELEPHONE (OUTPATIENT)
Dept: FAMILY MEDICINE CLINIC | Facility: CLINIC | Age: 68
End: 2023-03-24
Payer: MEDICARE

## 2023-03-24 NOTE — TELEPHONE ENCOUNTER
Dianna from [facility] called to request a verbal order to move patient's OT from this week to next. Please call her back at [phone].

## 2023-03-30 ENCOUNTER — TELEPHONE (OUTPATIENT)
Dept: FAMILY MEDICINE CLINIC | Facility: CLINIC | Age: 68
End: 2023-03-30
Payer: MEDICARE

## 2023-03-30 NOTE — TELEPHONE ENCOUNTER
Caller: HEATHER - Count includes the [facility]    Relationship to patient: Other    Best call back number: [phone]    Patient is needing: HEATHER CALLED IN REQUESTING VERBAL ORDERS FOR OCCUPATIONAL THERAPY.     HEATHER STATED THEY WOULD BE ORDERING SOME EQUIPMENT FOR THE PATIENTS  AND THOSE PAPERS MAY BE SENT THROUGH AS WELL. THEY WILL BE ORDERING THE PATIENT A BEDSIDE COMMODE AND A SPLINT FOR HER LEFT WRIST.

## 2023-04-06 NOTE — TELEPHONE ENCOUNTER
Called Yudith, [facility] to call us back.      OK FOR [facility] TO RELAY DR. PORTILLO' VERBAL ORDERS.

## 2023-04-12 ENCOUNTER — OFFICE VISIT (OUTPATIENT)
Dept: PULMONOLOGY | Facility: CLINIC | Age: 68
End: 2023-04-12
Payer: MEDICARE

## 2023-04-12 VITALS
WEIGHT: 126 LBS | HEART RATE: 61 BPM | HEIGHT: 64 IN | OXYGEN SATURATION: 94 % | DIASTOLIC BLOOD PRESSURE: 90 MMHG | BODY MASS INDEX: 21.51 KG/M2 | RESPIRATION RATE: 16 BRPM | SYSTOLIC BLOOD PRESSURE: 134 MMHG | TEMPERATURE: 97.8 F

## 2023-04-12 DIAGNOSIS — Z87.891 PERSONAL HISTORY OF TOBACCO USE, PRESENTING HAZARDS TO HEALTH: ICD-10-CM

## 2023-04-12 DIAGNOSIS — J96.11 CHRONIC RESPIRATORY FAILURE WITH HYPOXIA: ICD-10-CM

## 2023-04-12 DIAGNOSIS — J44.9 MODERATE COPD (CHRONIC OBSTRUCTIVE PULMONARY DISEASE): Primary | ICD-10-CM

## 2023-04-12 PROCEDURE — 1160F RVW MEDS BY RX/DR IN RCRD: CPT | Performed by: INTERNAL MEDICINE

## 2023-04-12 PROCEDURE — 3075F SYST BP GE 130 - 139MM HG: CPT | Performed by: INTERNAL MEDICINE

## 2023-04-12 PROCEDURE — 94060 EVALUATION OF WHEEZING: CPT | Performed by: INTERNAL MEDICINE

## 2023-04-12 PROCEDURE — 99205 OFFICE O/P NEW HI 60 MIN: CPT | Performed by: INTERNAL MEDICINE

## 2023-04-12 PROCEDURE — 1159F MED LIST DOCD IN RCRD: CPT | Performed by: INTERNAL MEDICINE

## 2023-04-12 PROCEDURE — 3080F DIAST BP >= 90 MM HG: CPT | Performed by: INTERNAL MEDICINE

## 2023-04-12 RX ORDER — ALBUTEROL SULFATE 90 UG/1
4 AEROSOL, METERED RESPIRATORY (INHALATION) ONCE
Status: COMPLETED | OUTPATIENT
Start: 2023-04-12 | End: 2023-04-12

## 2023-04-12 RX ORDER — EPINEPHRINE 0.3 MG/.3ML
INJECTION SUBCUTANEOUS
COMMUNITY
Start: 2023-03-20

## 2023-04-12 RX ORDER — ATORVASTATIN CALCIUM 10 MG/1
10 TABLET, FILM COATED ORAL DAILY
COMMUNITY
Start: 2023-03-14

## 2023-04-12 RX ORDER — FLUTICASONE FUROATE, UMECLIDINIUM BROMIDE AND VILANTEROL TRIFENATATE 200; 62.5; 25 UG/1; UG/1; UG/1
1 POWDER RESPIRATORY (INHALATION) DAILY
Qty: 1 EACH | Refills: 6 | Status: SHIPPED | OUTPATIENT
Start: 2023-04-12

## 2023-04-12 RX ADMIN — ALBUTEROL SULFATE 4 PUFF: 90 AEROSOL, METERED RESPIRATORY (INHALATION) at 08:33

## 2023-04-12 NOTE — PROGRESS NOTES
Initial Pulmonary Consult Note    Subjective   Reason for consultation/Chief Complaint: Shortness of Breath    Johnna Contreras is a 67 y.o. female is being seen for consultation today at the request of Ras Padilla DO    History of Present Illness    Ms. Contreras is a 66yo F smoker with a history of COPD and chronic hypoxic respiratory failure who is referred to [facility] care.     She was admitted to the hospital in February 2023 for hyponatremia along with acute on chronic respiratory failure.     She thinks that her breathing is slightly worsening. She is using Advair and Albuterol at home along with Duonebs. She has home oxygen but she doesn't use it very often. She is not using it while she is smoking. She continues to smoke 1ppd.     Active Ambulatory Problems     Diagnosis Date Noted   • Hyperlipidemia 11/12/2019   • Hypertension 11/12/2019   • Stroke 11/12/2019   • Personal history of tobacco use, presenting hazards to health 12/09/2020   • Solid nodule of lung 6 mm to 8 mm in diameter 12/09/2020   • Prediabetes 06/14/2022   • Bursitis of right shoulder 07/08/2022   • Impingement syndrome of right shoulder 07/08/2022   • Moderate COPD (chronic obstructive pulmonary disease) 01/31/2023   • Chronic respiratory failure with hypoxia 04/12/2023     Resolved Ambulatory Problems     Diagnosis Date Noted   • No Resolved Ambulatory Problems     Past Medical History:   Diagnosis Date   • Back problem    • Bronchitis    • Claustrophobia    • GERD (gastroesophageal reflux disease)    • Headache    • Seizures        Past Surgical History:   Procedure Laterality Date   • DILATATION AND CURETTAGE     • EYE SURGERY     • TONSILLECTOMY     • TUBAL ABDOMINAL LIGATION         Family History   Problem Relation Age of Onset   • Heart attack Mother    • Stroke Mother    • Hypertension Mother    • Hyperlipidemia Mother    • Diabetes Father    • Breast cancer Paternal Grandmother    • Cancer Paternal Grandmother    • Stroke  Paternal Grandmother    • Heart attack Paternal Grandmother    • Ovarian cancer Neg Hx        Social History     Socioeconomic History   • Marital status:    Tobacco Use   • Smoking status: Every Day     Packs/day: 1.00     Years: 50.00     Pack years: 50.00     Types: Cigarettes   • Smokeless tobacco: Never   Vaping Use   • Vaping Use: Never used   Substance and Sexual Activity   • Alcohol use: Yes     Comment: rarely    • Drug use: No   • Sexual activity: Defer       Allergies   Allergen Reactions   • Tree Nut Rash       Current Outpatient Medications   Medication Sig Dispense Refill   • albuterol sulfate  (90 Base) MCG/ACT inhaler Inhale 2 puffs Daily. 18 g 11   • amLODIPine (NORVASC) 5 MG tablet Take 1 tablet by mouth Daily. 90 tablet 3   • aspirin 81 MG tablet Take 1 tablet by mouth Daily.     • atorvastatin (LIPITOR) 10 MG tablet Take 1 tablet by mouth Daily.     • atorvastatin (LIPITOR) 20 MG tablet Take 1 tablet by mouth Daily. 90 tablet 3   • baclofen (LIORESAL) 20 MG tablet TAKE 1 TABLET 4 TIMES DAILY 360 tablet 1   • Calcium-Magnesium-Vitamin D (CALCIUM 1200+D3 PO) Take  by mouth.     • citalopram (CeleXA) 40 MG tablet Take 1 tablet by mouth Daily. 90 tablet 3   • EPINEPHrine (EPIPEN) 0.3 MG/0.3ML solution auto-injector injection      • Fexofenadine HCl (ALLERGY 24-HR PO) Take  by mouth.     • fluticasone (FLONASE) 50 MCG/ACT nasal spray 1 spray into the nostril(s) as directed by provider Daily. 32 g 0   • guaiFENesin (Mucinex) 600 MG 12 hr tablet Take 2 tablets by mouth 2 (Two) Times a Day. 60 tablet 2   • hydrocortisone (ANUSOL-HC) 2.5 % rectal cream Insert  into the rectum 2 (Two) Times a Day.     • ibuprofen (ADVIL,MOTRIN) 200 MG tablet Take 1 tablet by mouth Every 6 (Six) Hours As Needed for Mild Pain. 90 tablet 2   • IncobotulinumtoxinA 100 units reconstituted solution Inject 400 Units into the appropriate muscle as directed by prescriber Every 3 (Three) Months.     •  "ipratropium-albuterol (DUO-NEB) 0.5-2.5 mg/3 ml nebulizer Take 3 mL by nebulization Every 4 (Four) Hours As Needed for Wheezing. 360 mL 11   • ketoconazole (NIZORAL) 2 % shampoo Apply  topically to the appropriate area as directed 1 (One) Time Per Week. 120 mL 2   • lisinopril (PRINIVIL,ZESTRIL) 5 MG tablet Take 1 tablet by mouth Daily. 90 tablet 3   • multivitamin with minerals tablet tablet Take 1 tablet by mouth Daily.     • omeprazole (priLOSEC) 40 MG capsule Take 1 capsule by mouth 2 (Two) Times a Day. 180 capsule 3   • oxybutynin (DITROPAN) 5 MG tablet TAKE 1 TABLET EVERY NIGHT 90 tablet 1   • Fluticasone-Umeclidin-Vilant (Trelegy Ellipta) 200-62.5-25 MCG/ACT aerosol powder  Inhale 1 puff Daily. 1 each 6     No current facility-administered medications for this visit.       Review of Systems  All other systems were reviewed and are negative.  Exceptions are included in the HPI or as otherwise noted above.     Objective   Blood pressure 134/90, pulse 61, temperature 97.8 °F (36.6 °C), resp. rate 16, height 162.6 cm (64.02\"), weight 57.2 kg (126 lb), SpO2 94 %, not currently breastfeeding.  Physical Exam  Vitals and nursing note reviewed.   Constitutional:       General: She is not in acute distress.     Appearance: She is well-developed.   HENT:      Head: Normocephalic and atraumatic.   Eyes:      General: No scleral icterus.     Conjunctiva/sclera: Conjunctivae normal.      Pupils: Pupils are equal, round, and reactive to light.   Neck:      Thyroid: No thyromegaly.      Trachea: No tracheal deviation.   Cardiovascular:      Rate and Rhythm: Normal rate and regular rhythm.      Heart sounds: Normal heart sounds.   Pulmonary:      Effort: Pulmonary effort is normal. No respiratory distress.      Breath sounds: Decreased breath sounds present. No wheezing.   Abdominal:      General: Bowel sounds are normal.      Palpations: Abdomen is soft.      Tenderness: There is no abdominal tenderness.   Musculoskeletal:   "       General: Normal range of motion.      Cervical back: Normal range of motion and neck supple.   Lymphadenopathy:      Cervical: No cervical adenopathy.   Skin:     General: Skin is warm and dry.      Findings: No erythema or rash.   Neurological:      Mental Status: She is alert and oriented to person, place, and time.      Motor: No abnormal muscle tone.      Coordination: Coordination normal.   Psychiatric:         Speech: Speech normal.         Behavior: Behavior normal.         Judgment: Judgment normal.         PFTs:  Performed in clinic and personally reviewed.  There is moderate airway obstruction. There is a significant response to bronchodilators.     Imaging:  CT Chest from 2/23/23 reviewed. There is mild bibasilar dependent atelectasis. There is a small calcified granuloma in the right lower lobe. The lungs are otherwise clear.     Assessment & Plan   Diagnoses and all orders for this visit:    1. Moderate COPD (chronic obstructive pulmonary disease) (Primary)  -     Pulmonary Function Test  -     albuterol sulfate HFA (PROVENTIL HFA;VENTOLIN HFA;PROAIR HFA) inhaler 4 puff    2. Personal history of tobacco use, presenting hazards to health    3. Chronic respiratory failure with hypoxia    Other orders  -     Fluticasone-Umeclidin-Vilant (Trelegy Ellipta) 200-62.5-25 MCG/ACT aerosol powder ; Inhale 1 puff Daily.  Dispense: 1 each; Refill: 6        Discussion:  Ms. Contreras is a 68yo F who is referred to establish care for COPD.     1. Moderate COPD  - Change Advair to Trelegy 200 as she had a recent hospitalization for COPD exacerbation. I have given her samples and sent an Rx.   - Continue Albuterol as needed.   - Duonebs as needed.   - Check an Alpha-1 kit.     2. Chronic Respiratory Failure  - I have encouraged her to use her supplemental oxygen at least at night while she is sleeping.   - We will try and get her a POC for daytime use as she has a difficult time maneuvering an oxygen tank as she is  wheelchair bound as is her .     3. Tobacco Dependence  - Continues to smoke 1ppd.   - I have encouraged her to quit smoking.   - She qualifies for yearly low-dose CT scan of the chest for lung cancer screening. She will be due again in February 2024.     Follow up in 4-6 months. Instructed to call if she continues to worsen despite the change in inhalers as she may need a round of steroids for an exacerbation.        I have spent 62 minutes reviewing the patient record, face to face with the patient in discussion of test results and plans for further management and in documentation and coordination of care. Excluding time spent on other separate services such as performing procedures or test interpretation, if applicable.        Sobeida V Case, DO  Pulmonary and Critical Care Medicine  Note Electronically Signed

## 2023-04-13 ENCOUNTER — TELEPHONE (OUTPATIENT)
Dept: FAMILY MEDICINE CLINIC | Facility: CLINIC | Age: 68
End: 2023-04-13

## 2023-04-13 NOTE — TELEPHONE ENCOUNTER
Caller: [facility]    Relationship: [facility]    Best call back number: [phone]    What orders are you requesting (i.e. lab or imaging): [facility] IS COMING TO  OXYGEN AND REST OF THE EQUIPMENT    In what timeframe would the patient need to come in: ASAP    Where will you receive your lab/imaging services: [facility] STATES PATIENT OXYGEN IS 90-95

## 2023-04-13 NOTE — TELEPHONE ENCOUNTER
Patient is insistent on quitting oxygen therapy, she has contacted [facility] to retrieve supplies.     Home Health reports oxygen saturation remaining mid 90's

## 2023-04-14 ENCOUNTER — CLINICAL SUPPORT (OUTPATIENT)
Dept: PULMONOLOGY | Facility: CLINIC | Age: 68
End: 2023-04-14

## 2023-04-14 DIAGNOSIS — J44.9 MODERATE COPD (CHRONIC OBSTRUCTIVE PULMONARY DISEASE): Primary | ICD-10-CM

## 2023-04-21 ENCOUNTER — TELEPHONE (OUTPATIENT)
Dept: FAMILY MEDICINE CLINIC | Facility: CLINIC | Age: 68
End: 2023-04-21
Payer: MEDICARE

## 2023-04-21 DIAGNOSIS — J44.9 OBSTRUCTIVE CHRONIC BRONCHITIS WITHOUT EXACERBATION: ICD-10-CM

## 2023-04-21 DIAGNOSIS — J96.11 CHRONIC RESPIRATORY FAILURE WITH HYPOXIA: Primary | ICD-10-CM

## 2023-04-21 NOTE — TELEPHONE ENCOUNTER
PT IS NON COMPLIANT, SHE'S A HEAVY SMOKER IS NOT USING OXYGEN AND HAS HAD IT RETURNED. SHE'S ALSO NOT USING HER NEBULIZER AND PATIENT HAS A WHEEZE AND CHRONIC COUGH.

## 2023-04-27 ENCOUNTER — TELEPHONE (OUTPATIENT)
Dept: FAMILY MEDICINE CLINIC | Facility: CLINIC | Age: 68
End: 2023-04-27
Payer: MEDICARE

## 2023-04-27 NOTE — TELEPHONE ENCOUNTER
Caller: [facility]    Relationship: [facility]    Best call back number: [phone]    What orders are you requesting (i.e. lab or imaging): VERBAL ORDER FOR URINE SPECIMIN    In what timeframe would the patient need to come in: AS SOON AS POSSIBLE    Where will you receive your lab/imaging services: HOME [facility]    Additional notes: [facility] BELIEVES PATIENT HAS A UTI.

## 2023-05-05 ENCOUNTER — TELEPHONE (OUTPATIENT)
Dept: FAMILY MEDICINE CLINIC | Facility: CLINIC | Age: 68
End: 2023-05-05
Payer: MEDICARE

## 2023-05-05 NOTE — TELEPHONE ENCOUNTER
Patient reports that she had a recent fall and was evaluated at [facility] ER, she states that she is better but still has pain along her left side    She states her blood pressure was high this a.m but has improved more recently. Scheduled follow up with PCP

## 2023-05-10 ENCOUNTER — OFFICE VISIT (OUTPATIENT)
Dept: FAMILY MEDICINE CLINIC | Facility: CLINIC | Age: 68
End: 2023-05-10
Payer: MEDICARE

## 2023-05-10 ENCOUNTER — LAB (OUTPATIENT)
Dept: LAB | Facility: HOSPITAL | Age: 68
End: 2023-05-10
Payer: MEDICARE

## 2023-05-10 VITALS
DIASTOLIC BLOOD PRESSURE: 78 MMHG | SYSTOLIC BLOOD PRESSURE: 110 MMHG | HEART RATE: 76 BPM | TEMPERATURE: 96.9 F | OXYGEN SATURATION: 98 %

## 2023-05-10 DIAGNOSIS — I67.1 ANEURYSM OF LEFT INTERNAL CAROTID ARTERY: ICD-10-CM

## 2023-05-10 DIAGNOSIS — Z13.29 SCREENING FOR ENDOCRINE DISORDER: ICD-10-CM
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
## 2023-05-10 DIAGNOSIS — E87.1 HYPONATREMIA: ICD-10-CM

## 2023-05-10 LAB
ANION GAP SERPL CALCULATED.3IONS-SCNC: 6.6 MMOL/L (ref 5–15)
BILIRUB UR QL STRIP: NEGATIVE
BUN SERPL-MCNC: 10 MG/DL (ref 8–23)
BUN/CREAT SERPL: 14.1 (ref 7–25)
CALCIUM SPEC-SCNC: 10 MG/DL (ref 8.6–10.5)
CHLORIDE SERPL-SCNC: 106 MMOL/L (ref 98–107)
CLARITY UR: CLEAR
CO2 SERPL-SCNC: 29.4 MMOL/L (ref 22–29)
COLOR UR: YELLOW
CREAT SERPL-MCNC: 0.71 MG/DL (ref 0.57–1)
CREAT UR-MCNC: 12.8 MG/DL
EGFRCR SERPLBLD CKD-EPI 2021: 93.9 ML/MIN/1.73
GLUCOSE SERPL-MCNC: 107 MG/DL (ref 65–99)
GLUCOSE UR STRIP-MCNC: NEGATIVE MG/DL
HGB UR QL STRIP.AUTO: NEGATIVE
KETONES UR QL STRIP: NEGATIVE
LEUKOCYTE ESTERASE UR QL STRIP.AUTO: NEGATIVE
NITRITE UR QL STRIP: NEGATIVE
OSMOLALITY SERPL: 290 MOSM/KG (ref 280–301)
OSMOLALITY UR: 149 MOSM/KG
PH UR STRIP.AUTO: 7.5 [PH] (ref 5–8)
POTASSIUM SERPL-SCNC: 3.5 MMOL/L (ref 3.5–5.2)
PROT UR QL STRIP: NEGATIVE
SODIUM SERPL-SCNC: 142 MMOL/L (ref 136–145)
SODIUM UR-SCNC: 44 MMOL/L
SP GR UR STRIP: 1.01 (ref 1–1.03)
URATE SERPL-MCNC: 2.8 MG/DL (ref 2.4–5.7)
UROBILINOGEN UR QL STRIP: NORMAL

## 2023-05-10 PROCEDURE — 84550 ASSAY OF BLOOD/URIC ACID: CPT | Performed by: FAMILY MEDICINE

## 2023-05-10 PROCEDURE — 83935 ASSAY OF URINE OSMOLALITY: CPT | Performed by: FAMILY MEDICINE

## 2023-05-10 PROCEDURE — 84300 ASSAY OF URINE SODIUM: CPT | Performed by: FAMILY MEDICINE

## 2023-05-10 PROCEDURE — 83930 ASSAY OF BLOOD OSMOLALITY: CPT | Performed by: FAMILY MEDICINE

## 2023-05-10 PROCEDURE — 82570 ASSAY OF URINE CREATININE: CPT | Performed by: FAMILY MEDICINE

## 2023-05-10 PROCEDURE — 81003 URINALYSIS AUTO W/O SCOPE: CPT | Performed by: FAMILY MEDICINE

## 2023-05-10 PROCEDURE — 80048 BASIC METABOLIC PNL TOTAL CA: CPT | Performed by: FAMILY MEDICINE

## 2023-05-10 NOTE — PROGRESS NOTES
"Established Patient Office Visit      Patient Name: Johnna Contreras  : 1955   MRN: [id number]   Care Team: Patient Care Team:  Ras Padilla DO as PCP - General (Family Medicine)  Case, Sobeida ENCINAS DO as Consulting Physician (Pulmonary Disease)    Chief Complaint:    Chief Complaint   Patient presents with   • Numbness     Hospital follow-up R side weakness       History of Present Illness: Johnna Contreras is a 67 y.o. female who is here today for chief complaint.    HPI    Fell 4 times in 3 days, most recent . Brakes weren't set on her chair for 2 of them, others she fell asleep in chair. Twice she manjeet to her knees to get back up and fell backward.    Hospitalized at [facility] for about a week (retaining fluid). Was discharged and returned the following day to [facility], sodium level was low and was started on sodium supplement 2g TID.    Had CT of her head which did show an aneurysm. \"Medially projecting aneurysm at the left supraclinoid ICA just proximal to the carotid terminus with the mouth measuring 1.7 mm as identified on axial image 308 of series 5. Recommend close interval follow-up to ensure stability of size.\"    Still taking sodium, most recent labs on  were back to normal. She reports pain on left side of neck.    This patient is accompanied by their self who contributes to the history of their care.    The following portions of the patient's history were reviewed and updated as appropriate: allergies, current medications, past family history, past medical history, past social history, past surgical history and problem list.    Subjective      Review of Systems:   Review of Systems - See HPI    Past Medical History:   Past Medical History:   Diagnosis Date   • Back problem    • Bronchitis    • Claustrophobia    • GERD (gastroesophageal reflux disease)    • Headache    • Hyperlipidemia    • Hypertension    • Seizures     last seizure at the age of 21   • Stroke        Past Surgical History: "   Past Surgical History:   Procedure Laterality Date   • DILATATION AND CURETTAGE     • EYE SURGERY     • TONSILLECTOMY     • TUBAL ABDOMINAL LIGATION         Family History:   Family History   Problem Relation Age of Onset   • Heart attack Mother    • Stroke Mother    • Hypertension Mother    • Hyperlipidemia Mother    • Diabetes Father    • Breast cancer Paternal Grandmother    • Cancer Paternal Grandmother    • Stroke Paternal Grandmother    • Heart attack Paternal Grandmother    • Ovarian cancer Neg Hx        Social History:   Social History     Socioeconomic History   • Marital status:    Tobacco Use   • Smoking status: Every Day     Packs/day: 1.00     Years: 50.00     Pack years: 50.00     Types: Cigarettes   • Smokeless tobacco: Never   Vaping Use   • Vaping Use: Never used   Substance and Sexual Activity   • Alcohol use: Yes     Comment: rarely    • Drug use: No   • Sexual activity: Defer       Tobacco History:   Social History     Tobacco Use   Smoking Status Every Day   • Packs/day: 1.00   • Years: 50.00   • Pack years: 50.00   • Types: Cigarettes   Smokeless Tobacco Never       Medications:     Current Outpatient Medications:   •  albuterol sulfate  (90 Base) MCG/ACT inhaler, Inhale 2 puffs Daily., Disp: 18 g, Rfl: 11  •  amLODIPine (NORVASC) 5 MG tablet, Take 1 tablet by mouth Daily., Disp: 90 tablet, Rfl: 3  •  aspirin 81 MG tablet, Take 1 tablet by mouth Daily., Disp: , Rfl:   •  atorvastatin (LIPITOR) 10 MG tablet, Take 1 tablet by mouth Daily., Disp: , Rfl:   •  atorvastatin (LIPITOR) 20 MG tablet, Take 1 tablet by mouth Daily., Disp: 90 tablet, Rfl: 3  •  baclofen (LIORESAL) 20 MG tablet, TAKE 1 TABLET 4 TIMES DAILY, Disp: 360 tablet, Rfl: 1  •  Calcium-Magnesium-Vitamin D (CALCIUM 1200+D3 PO), Take  by mouth., Disp: , Rfl:   •  citalopram (CeleXA) 40 MG tablet, Take 1 tablet by mouth Daily., Disp: 90 tablet, Rfl: 3  •  EPINEPHrine (EPIPEN) 0.3 MG/0.3ML solution auto-injector  injection, , Disp: , Rfl:   •  Fexofenadine HCl (ALLERGY 24-HR PO), Take  by mouth., Disp: , Rfl:   •  fluticasone (FLONASE) 50 MCG/ACT nasal spray, 1 spray into the nostril(s) as directed by provider Daily., Disp: 32 g, Rfl: 0  •  Fluticasone-Umeclidin-Vilant (Trelegy Ellipta) 200-62.5-25 MCG/ACT aerosol powder , Inhale 1 puff Daily., Disp: 1 each, Rfl: 6  •  guaiFENesin (Mucinex) 600 MG 12 hr tablet, Take 2 tablets by mouth 2 (Two) Times a Day., Disp: 60 tablet, Rfl: 2  •  hydrocortisone (ANUSOL-HC) 2.5 % rectal cream, Insert  into the rectum 2 (Two) Times a Day., Disp: , Rfl:   •  ibuprofen (ADVIL,MOTRIN) 200 MG tablet, Take 1 tablet by mouth Every 6 (Six) Hours As Needed for Mild Pain., Disp: 90 tablet, Rfl: 2  •  IncobotulinumtoxinA 100 units reconstituted solution, Inject 400 Units into the appropriate muscle as directed by prescriber Every 3 (Three) Months., Disp: , Rfl:   •  ipratropium-albuterol (DUO-NEB) 0.5-2.5 mg/3 ml nebulizer, Take 3 mL by nebulization Every 4 (Four) Hours As Needed for Wheezing., Disp: 360 mL, Rfl: 11  •  ketoconazole (NIZORAL) 2 % shampoo, Apply  topically to the appropriate area as directed 1 (One) Time Per Week., Disp: 120 mL, Rfl: 2  •  lisinopril (PRINIVIL,ZESTRIL) 5 MG tablet, Take 1 tablet by mouth Daily., Disp: 90 tablet, Rfl: 3  •  multivitamin with minerals tablet tablet, Take 1 tablet by mouth Daily., Disp: , Rfl:   •  omeprazole (priLOSEC) 40 MG capsule, Take 1 capsule by mouth 2 (Two) Times a Day., Disp: 180 capsule, Rfl: 3  •  oxybutynin (DITROPAN) 5 MG tablet, TAKE 1 TABLET EVERY NIGHT, Disp: 90 tablet, Rfl: 1    Allergies:   Allergies   Allergen Reactions   • Tree Nut Rash       Objective   Objective     Physical Exam:  Vital Signs:   Vitals:    05/10/23 0824   BP: 110/78   BP Location: Left arm   Patient Position: Sitting   Cuff Size: Adult   Pulse: 76   Temp: 96.9 °F (36.1 °C)   TempSrc: Infrared   SpO2: 98%   Weight: Comment: unable to assess     There is no height  or weight on file to calculate BMI.     Physical Exam  Nursing note reviewed  Const: NAD, A&Ox4, Pleasant, Cooperative  Eyes: EOMI, no conjunctivitis  ENT: No nasal discharge present, neck supple  Cardiac: Regular rate and rhythm, no cyanosis  Resp: Respiratory rate within normal limits, no increased work of breathing, no audible wheezing or retractions noted  GI: No distention or ascites  MSK: Motor and sensation grossly intact in bilateral upper extremities  Neurologic: CN II-XII grossly intact  Psych: Appropriate mood and behavior.  Skin: Warm, dry  Procedures/Radiology     Procedures  No radiology results for the last 7 days     Assessment & Plan   Assessment / Plan      Assessment/Plan:   Problems Addressed This Visit  Diagnoses and all orders for this visit:    1. Fall, subsequent encounter (Primary)    2. Aneurysm of left internal carotid artery  Comments:  Medially projecting aneurysm at the left supraclinoid ICA just proximal to the carotid terminus with the mouth measuring 1.7 mm   Orders:  -     Ambulatory Referral to Vascular Surgery    3. Hyponatremia  -     Basic Metabolic Panel; Future  -     Osmolality, Serum; Future  -     Sodium, Urine, Random - Urine, Clean Catch; Future  -     Osmolality, Urine - Urine, Clean Catch; Future  -     Uric Acid; Future  -     Creatinine Urine Random (kidney function) GFR component - Urine, Clean Catch; Future  -     Basic Metabolic Panel  -     Cancel: Basic Metabolic Panel  -     Osmolality, Serum  -     Sodium, Urine, Random - Urine, Clean Catch  -     Osmolality, Urine - Urine, Clean Catch  -     Uric Acid  -     Creatinine Urine Random (kidney function) GFR component - Urine, Clean Catch    4. Screening for endocrine disorder  -     Urinalysis With Microscopic If Indicated (No Culture) - Urine, Clean Catch      Problem List Items Addressed This Visit    None  Visit Diagnoses     Fall, subsequent encounter    -  Primary    Aneurysm of left internal carotid artery         Medially projecting aneurysm at the left supraclinoid ICA just proximal to the carotid terminus with the mouth measuring 1.7 mm     Relevant Orders    Ambulatory Referral to Vascular Surgery    Hyponatremia        Relevant Orders    Basic Metabolic Panel    Osmolality, Serum (Completed)    Sodium, Urine, Random - Urine, Clean Catch (Completed)    Osmolality, Urine - Urine, Clean Catch (Completed)    Uric Acid (Completed)    Creatinine Urine Random (kidney function) GFR component - Urine, Clean Catch (Completed)    Screening for endocrine disorder                There are no Patient Instructions on file for this visit.    Follow Up:   No follow-ups on file.    DO GRUJIT Jackson RD  [facility]  [address]  [facility] [id number]  Fax [phone]  Phone [phone]

## 2023-05-19 ENCOUNTER — TELEPHONE (OUTPATIENT)
Dept: FAMILY MEDICINE CLINIC | Facility: CLINIC | Age: 68
End: 2023-05-19
Payer: MEDICARE

## 2023-05-19 NOTE — TELEPHONE ENCOUNTER
Caller: [facility]    Relationship:     Best call back number: [phone]    What is the best time to reach you: ANYTIME     Who are you requesting to speak with (clinical staff, provider,  specific staff member): CLINICAL STAFF     What was the call regarding: ESPERANZA IS CALLING WITH [facility]. SHE SAYS THAT THE PATIENT IS HAVING AN 8 OUT OF 10 PAIN WITH HER ABDOMEN. SHE HAS HAD A BOWEL MOVEMENT AND NO BLOOD WAS DETECTED. THE PAIN JUST STARTED THIS MORNING.     THEY ARE ALSO WANTING TO CHECK ON A REFERRAL THAT IS IN REGARDS TO THE ANEURYSM THAT SHE HAD.     Do you require a callback: YES

## 2023-05-19 NOTE — TELEPHONE ENCOUNTER
Spouse called on behalf of pt he says that she was admitted to the hospital and he does not feel like they have treated her issues while she was there. He wants to know if  can referrer her some where else please advise thank you.

## 2023-05-22 RX ORDER — ATORVASTATIN CALCIUM 10 MG/1
TABLET, FILM COATED ORAL
Qty: 90 TABLET | Refills: 1 | Status: SHIPPED | OUTPATIENT
Start: 2023-05-22

## 2023-05-22 NOTE — TELEPHONE ENCOUNTER
Contacted patient, gave verbal understanding of referral being sent to [facility].

## 2023-05-31 ENCOUNTER — TELEPHONE (OUTPATIENT)
Dept: FAMILY MEDICINE CLINIC | Facility: CLINIC | Age: 68
End: 2023-05-31

## 2023-05-31 NOTE — TELEPHONE ENCOUNTER
Caller: Ben Johnna [facility]CO ANTONIO    Relationship: [facility]    Best call back number: [phone]    Caller requesting test results: PATIENT     What test was performed: SCAN OF THE BRAIN     When was the test performed: 5/9/23    Where was the test performed: [facility]    Additional notes: PATIENT STATES DURING HER APPOINTMENT WITH DR. MESSER TODAY SHE WAS TOLD HE WAS UNABLE TO FIND THE RESULTS OF HER BRAIN SCAN. PATIENT STATES DR. MESSER WILL NEED THE TEST RESULTS FAXED TO HIM TO DETERMINE IF THE PROCEDURE FOR HER BRAIN ANEURYSM IS SAFE. HOME HEALTH CARE WILL ALSO NEED INSTRUCTIONS FOR PATIENT'S PHYSICAL THERAPY AND OCCUPATIONAL THERAPY DUE TO THE BRAIN ANEURYSM. PLEASE CALL PATIENT WITH AN UPDATE.

## 2023-05-31 NOTE — TELEPHONE ENCOUNTER
Brain/Neck CT has been forwarded to [facility].     [phone] fax     We discussed home health. I advised her that this has not been ordered by PCP and will follow up as needed.

## 2023-06-08 DIAGNOSIS — J44.9 MODERATE COPD (CHRONIC OBSTRUCTIVE PULMONARY DISEASE): ICD-10-CM

## 2023-06-08 RX ORDER — GUAIFENESIN 600 MG/1
TABLET, EXTENDED RELEASE ORAL
Qty: 60 TABLET | Refills: 2 | Status: SHIPPED | OUTPATIENT
Start: 2023-06-08

## 2023-06-16 ENCOUNTER — TELEPHONE (OUTPATIENT)
Dept: FAMILY MEDICINE CLINIC | Facility: CLINIC | Age: 68
End: 2023-06-16
Payer: MEDICARE

## 2023-06-16 NOTE — TELEPHONE ENCOUNTER
[facility] To inquire further left voicemail to return call     Attempted to contact patient, no answer. Unable to leave voicemail

## 2023-06-16 NOTE — TELEPHONE ENCOUNTER
Patient called and said that she went to see Dr. Silvio Trejo regarding her Aneurysm. The doctor told her that he could not see the results of her CT Scan. (It was done at [facility]) and he did not know where the aneurysm was located, and unsure if he was the right surgeon for her.     I see that the results were forwarded to [facility].     She said she called about this two weeks ago, and has not heard back from anyone. She would like a call back to let her know what to do now.

## 2023-06-19 NOTE — TELEPHONE ENCOUNTER
Contacted [facility] to inquire further. I was advised that patient has been followed by Dr. Morrow, he will continue to provide care but did not have copy of CT scan     I have refaxed report to [phone] for surgeon to review.    Left voicemail for patient to return call

## 2023-08-01 ENCOUNTER — OFFICE VISIT (OUTPATIENT)
Dept: FAMILY MEDICINE CLINIC | Facility: CLINIC | Age: 68
End: 2023-08-01
Payer: MEDICARE

## 2023-08-01 VITALS
SYSTOLIC BLOOD PRESSURE: 122 MMHG | WEIGHT: 125.8 LBS | HEIGHT: 64 IN | DIASTOLIC BLOOD PRESSURE: 78 MMHG | BODY MASS INDEX: 21.48 KG/M2
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
## 2023-08-01 DIAGNOSIS — L65.9 HAIR THINNING: ICD-10-CM

## 2023-08-01 NOTE — PROGRESS NOTES
Established Patient Office Visit      Patient Name: Johnna Contreras  : 1955   MRN: [id number]   Care Team: Patient Care Team:  Ras Padilla DO as PCP - General (Family Medicine)  Case, Sobeida ENCINAS DO as Consulting Physician (Pulmonary Disease)    Chief Complaint:    Chief Complaint   Patient presents with    Fall     Follow-up regarding a fall in May and a possible Aneurysm       History of Present Illness: Johnna Contreras is a 68 y.o. female who is here today for chief complaint.    HPI    ER visit in May at [facility]. Initial notes mentioned aneurysm, but now that images and readings are loaded in, no mention that I can see. May have been preliminary read.    This patient is accompanied by their self who contributes to the history of their care.    The following portions of the patient's history were reviewed and updated as appropriate: allergies, current medications, past family history, past medical history, past social history, past surgical history and problem list.    Subjective      Review of Systems:   Review of Systems - See HPI    Past Medical History:   Past Medical History:   Diagnosis Date    Back problem     Bronchitis     Claustrophobia     GERD (gastroesophageal reflux disease)     Headache     Hyperlipidemia     Hypertension     Seizures     last seizure at the age of 21    Stroke        Past Surgical History:   Past Surgical History:   Procedure Laterality Date    DILATATION AND CURETTAGE      EYE SURGERY      TONSILLECTOMY      TUBAL ABDOMINAL LIGATION         Family History:   Family History   Problem Relation Age of Onset    Heart attack Mother     Stroke Mother     Hypertension Mother     Hyperlipidemia Mother     Diabetes Father     Breast cancer Paternal Grandmother     Cancer Paternal Grandmother     Stroke Paternal Grandmother     Heart attack Paternal Grandmother     Ovarian cancer Neg Hx        Social History:   Social History     Socioeconomic History     Marital status:    Tobacco Use    Smoking status: Every Day     Packs/day: 1.00     Years: 50.00     Pack years: 50.00     Types: Cigarettes    Smokeless tobacco: Never   Vaping Use    Vaping Use: Never used   Substance and Sexual Activity    Alcohol use: Yes     Comment: rarely     Drug use: No    Sexual activity: Defer       Tobacco History:   Social History     Tobacco Use   Smoking Status Every Day    Packs/day: 1.00    Years: 50.00    Pack years: 50.00    Types: Cigarettes   Smokeless Tobacco Never       Medications:     Current Outpatient Medications:     albuterol sulfate  (90 Base) MCG/ACT inhaler, Inhale 2 puffs Daily., Disp: 18 g, Rfl: 11    amLODIPine (NORVASC) 5 MG tablet, Take 1 tablet by mouth Daily., Disp: 90 tablet, Rfl: 3    aspirin 81 MG tablet, Take 1 tablet by mouth Daily., Disp: , Rfl:     atorvastatin (LIPITOR) 10 MG tablet, TAKE 1 TABLET DAILY, Disp: 90 tablet, Rfl: 1    baclofen (LIORESAL) 20 MG tablet, TAKE 1 TABLET 4 TIMES DAILY, Disp: 360 tablet, Rfl: 1    Calcium-Magnesium-Vitamin D (CALCIUM 1200+D3 PO), Take  by mouth., Disp: , Rfl:     citalopram (CeleXA) 40 MG tablet, Take 1 tablet by mouth Daily., Disp: 90 tablet, Rfl: 3    EPINEPHrine (EPIPEN) 0.3 MG/0.3ML solution auto-injector injection, , Disp: , Rfl:     Fexofenadine HCl (ALLERGY 24-HR PO), Take  by mouth., Disp: , Rfl:     fluticasone (FLONASE) 50 MCG/ACT nasal spray, 1 spray into the nostril(s) as directed by provider Daily., Disp: 32 g, Rfl: 0    Fluticasone-Umeclidin-Vilant (Trelegy Ellipta) 200-62.5-25 MCG/ACT aerosol powder , Inhale 1 puff Daily., Disp: 1 each, Rfl: 6    hydrocortisone (ANUSOL-HC) 2.5 % rectal cream, Insert  into the rectum 2 (Two) Times a Day., Disp: , Rfl:     ibuprofen (ADVIL,MOTRIN) 200 MG tablet, Take 1 tablet by mouth Every 6 (Six) Hours As Needed for Mild Pain., Disp: 90 tablet, Rfl: 2    IncobotulinumtoxinA 100 units reconstituted solution, Inject 400 Units into  "the appropriate muscle as directed by prescriber Every 3 (Three) Months., Disp: , Rfl:     ipratropium-albuterol (DUO-NEB) 0.5-2.5 mg/3 ml nebulizer, Take 3 mL by nebulization Every 4 (Four) Hours As Needed for Wheezing., Disp: 360 mL, Rfl: 11    ketoconazole (NIZORAL) 2 % shampoo, Apply  topically to the appropriate area as directed 1 (One) Time Per Week., Disp: 120 mL, Rfl: 2    lisinopril (PRINIVIL,ZESTRIL) 5 MG tablet, Take 1 tablet by mouth Daily., Disp: 90 tablet, Rfl: 3    multivitamin with minerals tablet tablet, Take 1 tablet by mouth Daily., Disp: , Rfl:     omeprazole (priLOSEC) 40 MG capsule, Take 1 capsule by mouth 2 (Two) Times a Day., Disp: 180 capsule, Rfl: 3    oxybutynin (DITROPAN) 5 MG tablet, TAKE 1 TABLET EVERY NIGHT, Disp: 90 tablet, Rfl: 1    Minoxidil 5 % foam, Apply 1 application  topically 2 (Two) Times a Day., Disp: 60 g, Rfl: 11    Allergies:   Allergies   Allergen Reactions    Tree Nut Rash       Objective   Objective     Physical Exam:  Vital Signs:   Vitals:    08/01/23 0816   BP: 122/78   BP Location: Left arm   Patient Position: Sitting   Cuff Size: Adult   Weight: 57.1 kg (125 lb 12.8 oz)   Height: 162.6 cm (64.02\")     Body mass index is 21.58 kg/mý.     Physical Exam  Nursing note reviewed  Const: NAD, A&Ox4, Pleasant, Cooperative  Eyes: EOMI, no conjunctivitis  ENT: No nasal discharge present, neck supple  Cardiac: Regular rate and rhythm, no cyanosis  Resp: Respiratory rate within normal limits, no increased work of breathing, no audible wheezing or retractions noted  GI: No distention or ascites  MSK: Motor and sensation grossly intact in bilateral upper extremities  Neurologic: CN II-XII grossly intact  Psych: Appropriate mood and behavior.  Skin: Warm, dry  Procedures/Radiology     Procedures  No radiology results for the last 7 days     Assessment & Plan   Assessment / Plan      Assessment/Plan:   Problems Addressed This Visit  Diagnoses and all orders for this " visit:    1. Fall, subsequent encounter (Primary)    2. Hair thinning  -     Minoxidil 5 % foam; Apply 1 application  topically 2 (Two) Times a Day.  Dispense: 60 g; Refill: 11      Problem List Items Addressed This Visit    None  Visit Diagnoses       Fall, subsequent encounter    -  Primary    Hair thinning        Relevant Medications    Minoxidil 5 % foam            There are no Patient Instructions on file for this visit.    Follow Up:   Return in about 6 months (around 2/1/2024) for Medicare Wellness.    DO GURJIT Jackson RD  [facility]  [address]  [facility] [address]  Fax [phone]  Phone [phone]

## 2023-08-19 NOTE — TELEPHONE ENCOUNTER
Rx Refill Note  Requested Prescriptions     Pending Prescriptions Disp Refills    baclofen (LIORESAL) 20 MG tablet [Pharmacy Med Name: BACLOFEN TAB 20MG] 360 tablet 1     Sig: TAKE 1 TABLET 4 TIMES DAILY    oxybutynin (DITROPAN) 5 MG tablet [Pharmacy Med Name: OXYBUTYNIN TAB 5MG] 90 tablet 1     Sig: TAKE 1 TABLET EVERY NIGHT    citalopram (CeleXA) 40 MG tablet [Pharmacy Med Name: CITALOPRAM TAB 40MG] 90 tablet 3     Sig: TAKE 1 TABLET DAILY      Last office visit with prescribing clinician: 8/1/2023   Last telemedicine visit with prescribing clinician: Visit date not found   Next office visit with prescribing clinician: 2/2/2024                         Would you like a call back once the refill request has been completed: [] Yes [] No    If the office needs to give you a call back, can they leave a voicemail: [] Yes [] No    Madison Mcginnis MA  08/19/23, 11:09 EDT

## 2023-08-20 RX ORDER — OXYBUTYNIN CHLORIDE 5 MG/1
TABLET ORAL
Qty: 90 TABLET | Refills: 1 | Status: SHIPPED | OUTPATIENT
Start: 2023-08-20

## 2023-08-20 RX ORDER — BACLOFEN 20 MG/1
TABLET ORAL
Qty: 360 TABLET | Refills: 1 | Status: SHIPPED | OUTPATIENT
Start: 2023-08-20

## 2023-08-20 RX ORDER — CITALOPRAM 40 MG/1
TABLET ORAL
Qty: 90 TABLET | Refills: 3 | Status: SHIPPED | OUTPATIENT
Start: 2023-08-20

## 2023-09-02 DIAGNOSIS — K21.9 GASTROESOPHAGEAL REFLUX DISEASE WITHOUT ESOPHAGITIS: ICD-10-CM

## 2023-09-05 RX ORDER — OMEPRAZOLE 40 MG/1
CAPSULE, DELAYED RELEASE ORAL
Qty: 180 CAPSULE | Refills: 3 | Status: SHIPPED | OUTPATIENT
Start: 2023-09-05

## 2023-11-17 DIAGNOSIS — I10 PRIMARY HYPERTENSION: ICD-10-CM

## 2023-11-17 RX ORDER — LISINOPRIL 5 MG/1
5 TABLET ORAL DAILY
Qty: 90 TABLET | Refills: 2 | Status: SHIPPED | OUTPATIENT
Start: 2023-11-17

## 2023-11-21 ENCOUNTER — OFFICE VISIT (OUTPATIENT)
Dept: FAMILY MEDICINE CLINIC | Facility: CLINIC | Age: 68
End: 2023-11-21
Payer: MEDICARE

## 2023-11-21 VITALS — SYSTOLIC BLOOD PRESSURE: 118 MMHG | DIASTOLIC BLOOD PRESSURE: 68 MMHG

## 2023-11-21 DIAGNOSIS — M79.605 PAIN OF LEFT LOWER EXTREMITY: Primary | ICD-10-CM

## 2023-11-21 RX ORDER — METHYLPREDNISOLONE ACETATE 40 MG/ML
40 INJECTION, SUSPENSION INTRA-ARTICULAR; INTRALESIONAL; INTRAMUSCULAR; SOFT TISSUE ONCE
Status: COMPLETED | OUTPATIENT
Start: 2023-11-21 | End: 2023-11-21

## 2023-11-21 RX ADMIN — METHYLPREDNISOLONE ACETATE 40 MG: 40 INJECTION, SUSPENSION INTRA-ARTICULAR; INTRALESIONAL; INTRAMUSCULAR; SOFT TISSUE at 13:35

## 2023-11-21 NOTE — PROGRESS NOTES
Established Patient Office Visit      Patient Name: Johnna Contreras  : 1955   MRN: [id number]   Care Team: Patient Care Team:  Ras Padilla DO as PCP - General (Family Medicine)  Case, Sobeida ENCINAS DO as Consulting Physician (Pulmonary Disease)    Chief Complaint:    Chief Complaint   Patient presents with    Leg Pain     Pt co pain in left leg for approx 2 weeks       History of Present Illness: Johnna Contreras is a 68 y.o. female who is here today for chief complaint.    HPI    Has had a couple falls due to pain in RLE. Now having severe radiating pain in low back, left hip, and upper left leg.    This patient is accompanied by their self who contributes to the history of their care.    The following portions of the patient's history were reviewed and updated as appropriate: allergies, current medications, past family history, past medical history, past social history, past surgical history and problem list.    Subjective      Review of Systems:   Review of Systems - See HPI    Past Medical History:   Past Medical History:   Diagnosis Date    Back problem     Bronchitis     Claustrophobia     GERD (gastroesophageal reflux disease)     Headache     Hyperlipidemia     Hypertension     Seizures     last seizure at the age of 21    Stroke        Past Surgical History:   Past Surgical History:   Procedure Laterality Date    DILATATION AND CURETTAGE      EYE SURGERY      TONSILLECTOMY      TUBAL ABDOMINAL LIGATION         Family History:   Family History   Problem Relation Age of Onset    Heart attack Mother     Stroke Mother     Hypertension Mother     Hyperlipidemia Mother     Diabetes Father     Breast cancer Paternal Grandmother     Cancer Paternal Grandmother     Stroke Paternal Grandmother     Heart attack Paternal Grandmother     Ovarian cancer Neg Hx        Social History:   Social History     Socioeconomic History    Marital status:    Tobacco Use    Smoking status: Every Day     Packs/day:  1.00     Years: 50.00     Additional pack years: 0.00     Total pack years: 50.00     Types: Cigarettes    Smokeless tobacco: Never   Vaping Use    Vaping Use: Never used   Substance and Sexual Activity    Alcohol use: Yes     Comment: rarely     Drug use: No    Sexual activity: Defer       Tobacco History:   Social History     Tobacco Use   Smoking Status Every Day    Packs/day: 1.00    Years: 50.00    Additional pack years: 0.00    Total pack years: 50.00    Types: Cigarettes   Smokeless Tobacco Never       Medications:     Current Outpatient Medications:     albuterol sulfate  (90 Base) MCG/ACT inhaler, Inhale 2 puffs Daily., Disp: 18 g, Rfl: 11    amLODIPine (NORVASC) 5 MG tablet, Take 1 tablet by mouth Daily., Disp: 90 tablet, Rfl: 3    aspirin 81 MG tablet, Take 1 tablet by mouth Daily., Disp: , Rfl:     atorvastatin (LIPITOR) 10 MG tablet, TAKE 1 TABLET DAILY, Disp: 90 tablet, Rfl: 1    baclofen (LIORESAL) 20 MG tablet, TAKE 1 TABLET 4 TIMES DAILY, Disp: 360 tablet, Rfl: 1    Calcium-Magnesium-Vitamin D (CALCIUM 1200+D3 PO), Take  by mouth., Disp: , Rfl:     citalopram (CeleXA) 40 MG tablet, TAKE 1 TABLET DAILY, Disp: 90 tablet, Rfl: 3    EPINEPHrine (EPIPEN) 0.3 MG/0.3ML solution auto-injector injection, , Disp: , Rfl:     Fexofenadine HCl (ALLERGY 24-HR PO), Take  by mouth., Disp: , Rfl:     fluticasone (FLONASE) 50 MCG/ACT nasal spray, 1 spray into the nostril(s) as directed by provider Daily., Disp: 32 g, Rfl: 0    Fluticasone-Umeclidin-Vilant (Trelegy Ellipta) 200-62.5-25 MCG/ACT aerosol powder , Inhale 1 puff Daily., Disp: 1 each, Rfl: 6    hydrocortisone (ANUSOL-HC) 2.5 % rectal cream, Insert  into the rectum 2 (Two) Times a Day., Disp: , Rfl:     ibuprofen (ADVIL,MOTRIN) 200 MG tablet, Take 1 tablet by mouth Every 6 (Six) Hours As Needed for Mild Pain., Disp: 90 tablet, Rfl: 2    IncobotulinumtoxinA 100 units reconstituted solution, Inject 400 Units into the appropriate muscle as directed by  prescriber Every 3 (Three) Months., Disp: , Rfl:     ipratropium-albuterol (DUO-NEB) 0.5-2.5 mg/3 ml nebulizer, Take 3 mL by nebulization Every 4 (Four) Hours As Needed for Wheezing., Disp: 360 mL, Rfl: 11    ketoconazole (NIZORAL) 2 % shampoo, Apply  topically to the appropriate area as directed 1 (One) Time Per Week., Disp: 120 mL, Rfl: 2    lisinopril (PRINIVIL,ZESTRIL) 5 MG tablet, TAKE 1 TABLET DAILY, Disp: 90 tablet, Rfl: 2    Minoxidil 5 % foam, Apply 1 application  topically 2 (Two) Times a Day., Disp: 60 g, Rfl: 11    multivitamin with minerals tablet tablet, Take 1 tablet by mouth Daily., Disp: , Rfl:     omeprazole (priLOSEC) 40 MG capsule, TAKE 1 CAPSULE TWICE DAILY, Disp: 180 capsule, Rfl: 3    oxybutynin (DITROPAN) 5 MG tablet, TAKE 1 TABLET EVERY NIGHT, Disp: 90 tablet, Rfl: 1    Allergies:   Allergies   Allergen Reactions    Tree Nut Rash       Objective   Objective     Physical Exam:  Vital Signs:   Vitals:    11/21/23 1304   BP: 118/68   BP Location: Left arm   Patient Position: Sitting   Cuff Size: Adult   Weight: Comment: pt unable to stand     There is no height or weight on file to calculate BMI.     Physical Exam  Nursing note reviewed  Const: NAD, A&Ox4, Pleasant, Cooperative  MSK: In power wheelchair. Decreased L patellar reflex. Strength 4/5 LLE  Procedures/Radiology     Procedures  No radiology results for the last 7 days     Assessment & Plan   Assessment / Plan      Assessment/Plan:   Problems Addressed This Visit  Diagnoses and all orders for this visit:    1. Pain of left lower extremity (Primary)  -     methylPREDNISolone acetate (DEPO-medrol) injection 40 mg      Problem List Items Addressed This Visit    None  Visit Diagnoses       Pain of left lower extremity    -  Primary    Relevant Medications    methylPREDNISolone acetate (DEPO-medrol) injection 40 mg (Completed)          Steroid injection today for likely DDD flare. If not improved would recommend CT to assess for compression  fracture and neuro compromise.    There are no Patient Instructions on file for this visit.    Follow Up:   No follow-ups on file.      DO GURJIT Jackson RD  [facility]  [address]  [facility] [address]  Fax [phone]  Phone [phone]

## 2023-11-22 ENCOUNTER — TELEPHONE (OUTPATIENT)
Dept: FAMILY MEDICINE CLINIC | Facility: CLINIC | Age: 68
End: 2023-11-22
Payer: MEDICARE

## 2023-11-22 DIAGNOSIS — M54.42 ACUTE LEFT-SIDED LOW BACK PAIN WITH LEFT-SIDED SCIATICA: Primary | ICD-10-CM

## 2023-11-22 NOTE — TELEPHONE ENCOUNTER
PATIENT HAS CALLED REQUESTING A CALL BACK TO LET HER KNOW HOW LONG IT TAKES FOR A STEROID SHOT TO KICK IN. PATIENT RECEIVED SHOT IN OFFICE YESTERDAY.    CALL BACK NUMBER [phone]

## 2023-11-27 NOTE — TELEPHONE ENCOUNTER
I apologize, I was out of office at 12 on Wednesday so did not get this untl now. If it has not kicked in by now she should call and I would recommend further imaging

## 2023-11-27 NOTE — TELEPHONE ENCOUNTER
Ok, the nerve issue that she presented with and for which she received an injection last week--if those symptoms are still present I would recommend further imaging.

## 2024-02-16 ENCOUNTER — PRIOR AUTHORIZATION (OUTPATIENT)
Dept: FAMILY MEDICINE CLINIC | Facility: CLINIC | Age: 69
End: 2024-02-16

## 2024-02-16 ENCOUNTER — LAB (OUTPATIENT)
Dept: LAB | Facility: HOSPITAL | Age: 69
End: 2024-02-16
Payer: MEDICARE

## 2024-02-16 ENCOUNTER — HOSPITAL ENCOUNTER (OUTPATIENT)
Dept: GENERAL RADIOLOGY | Facility: HOSPITAL | Age: 69
Discharge: HOME | End: 2024-02-16
Payer: MEDICARE

## 2024-02-16 ENCOUNTER — OFFICE VISIT (OUTPATIENT)
Dept: FAMILY MEDICINE CLINIC | Facility: CLINIC | Age: 69
End: 2024-02-16
Payer: MEDICARE

## 2024-02-16 VITALS
BODY MASS INDEX: 20.42 KG/M2 | WEIGHT: 119.6 LBS | HEIGHT: 64 IN | HEART RATE: 68 BPM | OXYGEN SATURATION: 98 % | DIASTOLIC BLOOD PRESSURE: 66 MMHG | SYSTOLIC BLOOD PRESSURE: 112 MMHG

## 2024-02-16 DIAGNOSIS — Z13.29 SCREENING FOR ENDOCRINE DISORDER: ICD-10-CM

## 2024-02-16 DIAGNOSIS — Z00.00 MEDICARE ANNUAL WELLNESS VISIT, SUBSEQUENT: ICD-10-CM

## 2024-02-16 DIAGNOSIS — R40.0 UNCONTROLLED DAYTIME SOMNOLENCE: ICD-10-CM

## 2024-02-16 DIAGNOSIS — R73.03 PREDIABETES: ICD-10-CM

## 2024-02-16 DIAGNOSIS — Z87.891 PERSONAL HISTORY OF TOBACCO USE, PRESENTING HAZARDS TO HEALTH: ICD-10-CM

## 2024-02-16 DIAGNOSIS — M54.12 CERVICAL RADICULOPATHY: ICD-10-CM

## 2024-02-16 DIAGNOSIS — Z11.59 ENCOUNTER FOR HEPATITIS C SCREENING TEST FOR LOW RISK PATIENT: ICD-10-CM

## 2024-02-16 DIAGNOSIS — M54.12 CERVICAL RADICULOPATHY: Primary | ICD-10-CM

## 2024-02-16 DIAGNOSIS — M62.838 MUSCLE SPASM OF LEFT LOWER EXTREMITY: ICD-10-CM

## 2024-02-16 DIAGNOSIS — E78.5 HYPERLIPIDEMIA, UNSPECIFIED HYPERLIPIDEMIA TYPE: ICD-10-CM

## 2024-02-16 DIAGNOSIS — I10 PRIMARY HYPERTENSION: ICD-10-CM

## 2024-02-16 LAB
ALBUMIN SERPL-MCNC: 4.6 G/DL (ref 3.5–5.2)
ALBUMIN/GLOB SERPL: 1.7 G/DL
ALP SERPL-CCNC: 107 U/L (ref 39–117)
ALT SERPL W P-5'-P-CCNC: 27 U/L (ref 1–33)
ANION GAP SERPL CALCULATED.3IONS-SCNC: 10 MMOL/L (ref 5–15)
AST SERPL-CCNC: 27 U/L (ref 1–32)
BASOPHILS # BLD AUTO: 0.04 10*3/MM3 (ref 0–0.2)
BASOPHILS NFR BLD AUTO: 0.6 % (ref 0–1.5)
BILIRUB SERPL-MCNC: 0.2 MG/DL (ref 0–1.2)
BILIRUB UR QL STRIP: NEGATIVE
BUN SERPL-MCNC: 14 MG/DL (ref 8–23)
BUN/CREAT SERPL: 19.4 (ref 7–25)
CALCIUM SPEC-SCNC: 10 MG/DL (ref 8.6–10.5)
CHLORIDE SERPL-SCNC: 102 MMOL/L (ref 98–107)
CHOLEST SERPL-MCNC: 156 MG/DL (ref 0–200)
CLARITY UR: CLEAR
CO2 SERPL-SCNC: 28 MMOL/L (ref 22–29)
COLOR UR: YELLOW
CREAT SERPL-MCNC: 0.72 MG/DL (ref 0.57–1)
DEPRECATED RDW RBC AUTO: 40.3 FL (ref 37–54)
EGFRCR SERPLBLD CKD-EPI 2021: 91.8 ML/MIN/1.73
EOSINOPHIL # BLD AUTO: 0.21 10*3/MM3 (ref 0–0.4)
EOSINOPHIL NFR BLD AUTO: 3.2 % (ref 0.3–6.2)
ERYTHROCYTE [DISTWIDTH] IN BLOOD BY AUTOMATED COUNT: 12.8 % (ref 12.3–15.4)
GLOBULIN UR ELPH-MCNC: 2.7 GM/DL
GLUCOSE SERPL-MCNC: 77 MG/DL (ref 65–99)
GLUCOSE UR STRIP-MCNC: NEGATIVE MG/DL
HBA1C MFR BLD: 5.9 % (ref 4.8–5.6)
HCT VFR BLD AUTO: 41.6 % (ref 34–46.6)
HCV AB SER DONR QL: NORMAL
HDLC SERPL-MCNC: 66 MG/DL (ref 40–60)
HGB BLD-MCNC: 14.2 G/DL (ref 12–15.9)
HGB UR QL STRIP.AUTO: NEGATIVE
IMM GRANULOCYTES # BLD AUTO: 0 10*3/MM3 (ref 0–0.05)
IMM GRANULOCYTES NFR BLD AUTO: 0 % (ref 0–0.5)
KETONES UR QL STRIP: NEGATIVE
LDLC SERPL CALC-MCNC: 78 MG/DL (ref 0–100)
LDLC/HDLC SERPL: 1.18 {RATIO}
LEUKOCYTE ESTERASE UR QL STRIP.AUTO: NEGATIVE
LYMPHOCYTES # BLD AUTO: 1.95 10*3/MM3 (ref 0.7–3.1)
LYMPHOCYTES NFR BLD AUTO: 29.7 % (ref 19.6–45.3)
MCH RBC QN AUTO: 30.3 PG (ref 26.6–33)
MCHC RBC AUTO-ENTMCNC: 34.1 G/DL (ref 31.5–35.7)
MCV RBC AUTO: 88.7 FL (ref 79–97)
MONOCYTES # BLD AUTO: 0.54 10*3/MM3 (ref 0.1–0.9)
MONOCYTES NFR BLD AUTO: 8.2 % (ref 5–12)
NEUTROPHILS NFR BLD AUTO: 3.83 10*3/MM3 (ref 1.7–7)
NEUTROPHILS NFR BLD AUTO: 58.3 % (ref 42.7–76)
NITRITE UR QL STRIP: NEGATIVE
NRBC BLD AUTO-RTO: 0 /100 WBC (ref 0–0.2)
PH UR STRIP.AUTO: 7 [PH] (ref 5–8)
PLATELET # BLD AUTO: 207 10*3/MM3 (ref 140–450)
PMV BLD AUTO: 12.8 FL (ref 6–12)
POTASSIUM SERPL-SCNC: 3.6 MMOL/L (ref 3.5–5.2)
PROT SERPL-MCNC: 7.3 G/DL (ref 6–8.5)
PROT UR QL STRIP: NEGATIVE
RBC # BLD AUTO: 4.69 10*6/MM3 (ref 3.77–5.28)
SODIUM SERPL-SCNC: 140 MMOL/L (ref 136–145)
SP GR UR STRIP: 1.01 (ref 1–1.03)
TRIGL SERPL-MCNC: 61 MG/DL (ref 0–150)
TSH SERPL DL<=0.05 MIU/L-ACNC: 1.48 UIU/ML (ref 0.27–4.2)
UROBILINOGEN UR QL STRIP: NORMAL
VLDLC SERPL-MCNC: 12 MG/DL (ref 5–40)
WBC NRBC COR # BLD AUTO: 6.57 10*3/MM3 (ref 3.4–10.8)

## 2024-02-16 PROCEDURE — 83036 HEMOGLOBIN GLYCOSYLATED A1C: CPT | Performed by: FAMILY MEDICINE

## 2024-02-16 PROCEDURE — 72040 X-RAY EXAM NECK SPINE 2-3 VW: CPT

## 2024-02-16 PROCEDURE — 84443 ASSAY THYROID STIM HORMONE: CPT | Performed by: FAMILY MEDICINE

## 2024-02-16 PROCEDURE — 80061 LIPID PANEL: CPT | Performed by: FAMILY MEDICINE

## 2024-02-16 PROCEDURE — 80053 COMPREHEN METABOLIC PANEL: CPT | Performed by: FAMILY MEDICINE

## 2024-02-16 PROCEDURE — 86803 HEPATITIS C AB TEST: CPT | Performed by: FAMILY MEDICINE

## 2024-02-16 PROCEDURE — 81003 URINALYSIS AUTO W/O SCOPE: CPT | Performed by: FAMILY MEDICINE

## 2024-02-16 PROCEDURE — 85025 COMPLETE CBC W/AUTO DIFF WBC: CPT | Performed by: FAMILY MEDICINE

## 2024-02-16 RX ORDER — VARENICLINE TARTRATE 1 MG/1
1 TABLET, FILM COATED ORAL 2 TIMES DAILY
Qty: 180 TABLET | Refills: 0 | Status: SHIPPED | OUTPATIENT
Start: 2024-02-16 | End: 2024-05-16

## 2024-02-16 RX ORDER — VARENICLINE TARTRATE 0.5 MG/1
TABLET, FILM COATED ORAL
Qty: 11 TABLET | Refills: 0 | Status: SHIPPED | OUTPATIENT
Start: 2024-02-16

## 2024-02-16 RX ORDER — BACLOFEN 20 MG/1
20 TABLET ORAL 4 TIMES DAILY
Qty: 360 TABLET | Refills: 1 | Status: SHIPPED | OUTPATIENT
Start: 2024-02-16

## 2024-02-16 NOTE — PATIENT INSTRUCTIONS
Advance Care Planning and Advance Directives     You make decisions on a daily basis - decisions about where you want to live, your career, your home, your life. Perhaps one of the most important decisions you face is your choice for future medical care. Take time to talk with your family and your healthcare team and start planning today.  Advance Care Planning is a process that can help you:  Understand possible future healthcare decisions in light of your own experiences  Reflect on those decision in light of your goals and values  Discuss your decisions with those closest to you and the healthcare professionals that care for you  Make a plan by creating a document that reflects your wishes    Surrogate Decision Maker  In the event of a medical emergency, which has left you unable to communicate or to make your own decisions, you would need someone to make decisions for you.  It is important to discuss your preferences for medical treatment with this person while you are in good health.     Qualities of a surrogate decision maker:  Willing to take on this role and responsibility  Knows what you want for future medical care  Willing to follow your wishes even if they don't agree with them  Able to make difficult medical decisions under stressful circumstances    Advance Directives  These are legal documents you can create that will guide your healthcare team and decision maker(s) when needed. These documents can be stored in the electronic medical record.    Living Will - a legal document to guide your care if you have a terminal condition or a serious illness and are unable to communicate. States vary by statute in document names/types, but most forms may include one or more of the following:        -  Directions regarding life-prolonging treatments        -  Directions regarding artificially provided nutrition/hydration        -  Choosing a healthcare decision maker        -  Direction regarding organ/tissue  donation    Durable Power of  for Healthcare - this document names an -in-fact to make medical decisions for you, but it may also allow this person to make personal and financial decisions for you. Please seek the advice of an  if you need this type of document.    **Advance Directives are not required and no one may discriminate against you if you do not sign one.    Medical Orders  Many states allow specific forms/orders signed by your physician to record your wishes for medical treatment in your current state of health. This form, signed in personal communication with your physician, addresses resuscitation and other medical interventions that you may or may not want.      For more information or to schedule a time with a [facility] Advance Care Planning Facilitator contact: [facility] or call [phone] and someone will contact you directly.  You are due for Shingrix vaccination series ( the newest shingles vaccine).  It is a two shot series spaced 2-6 months apart. Please get this vaccine series started at your earliest convenience at your local pharmacy to help avoid shingles outbreak. It is more effective than the old Zostavax vaccine and is recommended even if you have had the Zostavax vaccine in the past.  Once the Shingrix series is completed, it does not need to be repeated.   For more information, please look at the website below:  [facility] Shingrix Vaccine Information      Medicare Wellness  Personal Prevention Plan of Service     Date of Office Visit:    Encounter Provider:  Ras Padilla DO  Place of Service:  [facility]  Patient Name: Johnna Contreras  :  1955    As part of the Medicare Wellness portion of your visit today, we are providing you with this personalized preventive plan of services (PPPS). This plan is based upon recommendations of the [place] Preventive Services Task Force (USPSTF) and the Advisory  Committee on Immunization Practices (ACIP).    This lists the preventive care services that should be considered, and provides dates of when you are due. Items listed as completed are up-to-date and do not require any further intervention.    Health Maintenance   Topic Date Due   • ZOSTER VACCINE (1 of 2) Never done   • RSV Vaccine - Adults (1 - 1-dose 60+ series) Never done   • HEPATITIS C SCREENING  Never done   • PAP SMEAR  Never done   • ANNUAL WELLNESS VISIT  01/31/2024   • INFLUENZA VACCINE  03/31/2024 (Originally 8/1/2023)   • COVID-19 Vaccine (4 - 2023-24 season) 05/07/2024 (Originally 9/1/2023)   • TDAP/TD VACCINES (1 - Tdap) 11/21/2024 (Originally 6/9/1975)   • LUNG CANCER SCREENING  02/23/2024   • LIPID PANEL  02/24/2024   • DXA SCAN  03/22/2024   • MAMMOGRAM  05/17/2024   • COLORECTAL CANCER SCREENING  01/07/2031   • Pneumococcal Vaccine 65+  Completed       Orders Placed This Encounter   Procedures   • XR Spine Cervical 2 or 3 View     Standing Status:   Future     Standing Expiration Date:   2/16/2025     Order Specific Question:   Reason for Exam:     Answer:   right UE radiculopathy     Order Specific Question:   Release to patient     Answer:   Routine Release [[id number]]   • Ambulatory Referral to Orthopedic Surgery     Referral Priority:   Routine     Referral Type:   Consultation     Referral Reason:   Specialty Services Required     Referral [facility]   [facility] hospital     Requested Specialty:   Orthopedic Surgery     Number of Visits Requested:   1       No follow-ups on file.

## 2024-03-04 NOTE — PROGRESS NOTES
The ABCs of the Annual Wellness Visit  Subsequent Medicare Wellness Visit    Subjective      Johnna Contreras is a 68 y.o. female who presents for a Subsequent Medicare Wellness Visit.    The following portions of the patient's history were reviewed and   updated as appropriate: allergies, current medications, past family history, past medical history, past social history, past surgical history, and problem list.    Compared to one year ago, the patient feels her physical   health is the same.    Compared to one year ago, the patient feels her mental   health is the same.    Recent Hospitalizations:  She was not admitted to the hospital during the last year.       Current Medical Providers:  Patient Care Team:  Ras Padilla DO as PCP - General (Family Medicine)  Case, Sobeida ENCINAS DO as Consulting Physician (Pulmonary Disease)    Outpatient Medications Prior to Visit   Medication Sig Dispense Refill    albuterol sulfate  (90 Base) MCG/ACT inhaler Inhale 2 puffs Daily. 18 g 11    amLODIPine (NORVASC) 5 MG tablet Take 1 tablet by mouth Daily. 90 tablet 3    aspirin 81 MG tablet Take 1 tablet by mouth Daily.      atorvastatin (LIPITOR) 10 MG tablet TAKE 1 TABLET DAILY 90 tablet 1    Calcium-Magnesium-Vitamin D (CALCIUM 1200+D3 PO) Take  by mouth.      citalopram (CeleXA) 40 MG tablet TAKE 1 TABLET DAILY 90 tablet 3    EPINEPHrine (EPIPEN) 0.3 MG/0.3ML solution auto-injector injection       Fexofenadine HCl (ALLERGY 24-HR PO) Take  by mouth.      fluticasone (FLONASE) 50 MCG/ACT nasal spray 1 spray into the nostril(s) as directed by provider Daily. 32 g 0    Fluticasone-Umeclidin-Vilant (Trelegy Ellipta) 200-62.5-25 MCG/ACT aerosol powder  Inhale 1 puff Daily. 1 each 6    hydrocortisone (ANUSOL-HC) 2.5 % rectal cream Insert  into the rectum 2 (Two) Times a Day.      ibuprofen (ADVIL,MOTRIN) 200 MG tablet Take 1 tablet by mouth Every 6 (Six) Hours As Needed for Mild Pain. 90 tablet 2    IncobotulinumtoxinA  100 units reconstituted solution Inject 400 Units into the appropriate muscle as directed by prescriber Every 3 (Three) Months.      ipratropium-albuterol (DUO-NEB) 0.5-2.5 mg/3 ml nebulizer Take 3 mL by nebulization Every 4 (Four) Hours As Needed for Wheezing. 360 mL 11    ketoconazole (NIZORAL) 2 % shampoo Apply  topically to the appropriate area as directed 1 (One) Time Per Week. 120 mL 2    lisinopril (PRINIVIL,ZESTRIL) 5 MG tablet TAKE 1 TABLET DAILY 90 tablet 2    Minoxidil 5 % foam Apply 1 application  topically 2 (Two) Times a Day. 60 g 11    multivitamin with minerals tablet tablet Take 1 tablet by mouth Daily.      omeprazole (priLOSEC) 40 MG capsule TAKE 1 CAPSULE TWICE DAILY 180 capsule 3    oxybutynin (DITROPAN) 5 MG tablet TAKE 1 TABLET EVERY NIGHT 90 tablet 1    baclofen (LIORESAL) 20 MG tablet TAKE 1 TABLET 4 TIMES DAILY 360 tablet 1     No facility-administered medications prior to visit.       No opioid medication identified on active medication list. I have reviewed chart for other potential  high risk medication/s and harmful drug interactions in the elderly.        Aspirin is on active medication list. Aspirin use is indicated based on review of current medical condition/s. Pros and cons of this therapy have been discussed today. Benefits of this medication outweigh potential harm.  Patient has been encouraged to continue taking this medication.  .      Patient Active Problem List   Diagnosis    Hyperlipidemia    Hypertension    Stroke    Personal history of tobacco use, presenting hazards to health    Solid nodule of lung 6 mm to 8 mm in diameter    Prediabetes    Bursitis of right shoulder    Impingement syndrome of right shoulder    Moderate COPD (chronic obstructive pulmonary disease)    Chronic respiratory failure with hypoxia     Advance Care Planning   Advance Care Planning     Advance Directive is not on file.  ACP discussion was held with the patient during this visit. Patient does not  "have an advance directive, information provided.     Objective    Vitals:    24 0818   BP: 112/66   BP Location: Left arm   Patient Position: Sitting   Cuff Size: Adult   Pulse: 68   SpO2: 98%   Weight: 54.3 kg (119 lb 9.6 oz)   Height: 162.6 cm (64.02\")   PainSc:   8   PainLoc: Leg     Estimated body mass index is 20.52 kg/m² as calculated from the following:    Height as of this encounter: 162.6 cm (64.02\").    Weight as of this encounter: 54.3 kg (119 lb 9.6 oz).    BMI is within normal parameters. No other follow-up for BMI required.      Does the patient have evidence of cognitive impairment?   No  ATTENTION  What is the year: correct  What is the month of the year: correct  What is the day of the week?: correct  What is the date?: correct  MEMORY  Repeat address three times, only score third attempt: [address] [place], [place]: 6  HOW MANY ANIMALS DID THE PATIENT NAME  Verbal Fluency -- Animal Names (0-25): 17-21  CLOCK DRAWING  Clock Drawing:  (unable to complete)  MEMORY RECALL  Tell me what you remember about that name and address we were repeating at the beginnin   Lab Results   Component Value Date    TRIG 61 2024    HDL 66 (H) 2024    LDL 78 2024    VLDL 12 2024    HGBA1C 5.90 (H) 2024          HEALTH RISK ASSESSMENT    Smoking Status:  Social History     Tobacco Use   Smoking Status Every Day    Current packs/day: 1.00    Average packs/day: 1 pack/day for 50.0 years (50.0 ttl pk-yrs)    Types: Cigarettes   Smokeless Tobacco Never     Alcohol Consumption:  Social History     Substance and Sexual Activity   Alcohol Use Yes    Comment: rarely      Fall Risk Screen:    JAMILA Fall Risk Assessment was completed, and patient is at LOW risk for falls.Assessment completed on:2024    Depression Screenin/16/2024     8:21 AM   PHQ-2/PHQ-9 Depression Screening   Little Interest or Pleasure in Doing Things 0-->not at all   Feeling Down, " Depressed or Hopeless 0-->not at all   PHQ-9: Brief Depression Severity Measure Score 0       Health Habits and Functional and Cognitive Screenin/16/2024     8:21 AM   Functional & Cognitive Status   Do you have difficulty preparing food and eating? Yes   Do you have difficulty bathing yourself, getting dressed or grooming yourself? Yes   Do you have difficulty using the toilet? Yes   Do you have difficulty moving around from place to place? Yes   Do you have trouble with steps or getting out of a bed or a chair? Yes   Current Diet Well Balanced Diet   Dental Exam Up to date   Eye Exam Up to date   Exercise (times per week) 0 times per week   Current Exercises Include No Regular Exercise   Do you need help using the phone?  No   Are you deaf or do you have serious difficulty hearing?  Yes   Do you need help to go to places out of walking distance? Yes   Do you need help shopping? Yes   Do you need help preparing meals?  Yes   Do you need help with housework?  Yes   Do you need help with laundry? Yes   Do you need help taking your medications? No   Do you need help managing money? No   Do you ever drive or ride in a car without wearing a seat belt? No   Have you felt unusual stress, anger or loneliness in the last month? No   Who do you live with? Spouse   If you need help, do you have trouble finding someone available to you? No   Have you been bothered in the last four weeks by sexual problems? No   Do you have difficulty concentrating, remembering or making decisions? No       Age-appropriate Screening Schedule:  Refer to the list below for future screening recommendations based on patient's age, sex and/or medical conditions. Orders for these recommended tests are listed in the plan section. The patient has been provided with a written plan.    Health Maintenance   Topic Date Due    ZOSTER VACCINE (1 of 2) Never done    RSV Vaccine - Adults (1 - 1-dose 60+ series) Never done    PAP SMEAR  Never done     ANNUAL WELLNESS VISIT  01/31/2024    LUNG CANCER SCREENING  02/23/2024    INFLUENZA VACCINE  03/31/2024 (Originally 8/1/2023)    COVID-19 Vaccine (4 - 2023-24 season) 05/07/2024 (Originally 9/1/2023)    TDAP/TD VACCINES (1 - Tdap) 11/21/2024 (Originally 6/9/1974)    DXA SCAN  03/22/2024    MAMMOGRAM  05/17/2024    LIPID PANEL  02/16/2025    COLORECTAL CANCER SCREENING  01/07/2031    HEPATITIS C SCREENING  Completed    Pneumococcal Vaccine 65+  Completed                  CMS Preventative Services Quick Reference  Risk Factors Identified During Encounter:    Tobacco Use/Dependance Risk (use dotphrase .tobaccocessation for documentation)    The above risks/problems have been discussed with the patient.  Pertinent information has been shared with the patient in the After Visit Summary.    Diagnoses and all orders for this visit:    1. Cervical radiculopathy (Primary)  -     XR Spine Cervical 2 or 3 View; Future  -     Ambulatory Referral to Orthopedic Surgery    2. Muscle spasm of left lower extremity  -     baclofen (LIORESAL) 20 MG tablet; Take 1 tablet by mouth 4 (Four) Times a Day.  Dispense: 360 tablet; Refill: 1    3. Medicare annual wellness visit, subsequent    4. Personal history of tobacco use, presenting hazards to health  -     varenicline (Chantix) 1 MG tablet; Take 1 tablet by mouth 2 (Two) Times a Day for 90 days.  Dispense: 180 tablet; Refill: 0  -     varenicline (Chantix) 0.5 MG tablet; Take 1 tablet PO daily for 3 days, then 1 tablet PO morning and night for 4 days  Dispense: 11 tablet; Refill: 0    5. Prediabetes  -     Hemoglobin A1c; Future  -     Comprehensive Metabolic Panel; Future  -     Urinalysis With Microscopic If Indicated (No Culture) - Urine, Clean Catch; Future  -     Hemoglobin A1c  -     Comprehensive Metabolic Panel  -     Urinalysis With Microscopic If Indicated (No Culture) - Urine, Clean Catch    6. Primary hypertension    7. Hyperlipidemia, unspecified hyperlipidemia type  -      Lipid Panel; Future  -     Lipid Panel    8. Screening for endocrine disorder  -     TSH; Future  -     TSH    9. Uncontrolled daytime somnolence  Comments:  3-4 hours of sleep per night. Ref to sleep specialist today.  Orders:  -     CBC & Differential; Future  -     Ambulatory Referral to Sleep Medicine  -     CBC & Differential    10. Encounter for hepatitis C screening test for low risk patient  -     Hepatitis C Antibody; Future  -     Hepatitis C Antibody      Problem List Items Addressed This Visit          Cardiac and Vasculature    Hyperlipidemia    Overview     Started atorvastatin 10mg June 2022.  The notes from the hospital at [facility] report for her to start 40 mg daily, but she was discharged on 20 mg daily.  I think it is reasonable to start at a lower dose, refill provided for the atorvastatin 20 mg.         Relevant Orders    Lipid Panel (Completed)    Hypertension    Overview     Amlodipine 5mg daily, lisinopril 5mg (changed from lisinopril-HCTZ 20-12.5 inpatient 2/2023 for hyponatremia)            Endocrine and Metabolic    Prediabetes    Relevant Orders    Hemoglobin A1c (Completed)    Comprehensive Metabolic Panel (Completed)    Urinalysis With Microscopic If Indicated (No Culture) - Urine, Clean Catch (Completed)       Tobacco    Personal history of tobacco use, presenting hazards to health    Overview     Failed Chantix, patches (OTC only per insurance), lozenges. Currently at 1ppd.  Completed CT low dose 9/10/20, small 7 mm noncalcified pleural-based nodule identified within the inferior aspect of the right upper lobe. Follow-up was stable, rec routine 1 year screening.         Relevant Medications    varenicline (Chantix) 1 MG tablet    varenicline (Chantix) 0.5 MG tablet     Other Visit Diagnoses       Cervical radiculopathy    -  Primary    Relevant Orders    XR Spine Cervical 2 or 3 View (Completed)    Ambulatory Referral to Orthopedic Surgery (Completed)    Muscle spasm of left lower  extremity        Relevant Medications    baclofen (LIORESAL) 20 MG tablet    Medicare annual wellness visit, subsequent        Screening for endocrine disorder        Relevant Orders    TSH (Completed)    Uncontrolled daytime somnolence        3-4 hours of sleep per night. Ref to sleep specialist today.    Relevant Orders    CBC & Differential (Completed)    Ambulatory Referral to Sleep Medicine    Encounter for hepatitis C screening test for low risk patient        Relevant Orders    Hepatitis C Antibody (Completed)          The wellness exam has been reviewed in detail.  The patient has been fully counseled on preventative guidelines for vaccines, cancer screenings, and other health maintenance needs.  Functional testing has been performed to assess capacity for independent living and need for other medical interventions.   The patient was counseled on maintaining a lifestyle to promote good health and to minimize chronic diseases.  The patient has been assisted with scheduling healthcare procedures for the coming year and given a written document outlining these recommendations.    Follow Up:   Next Medicare Wellness visit to be scheduled in 1 year.      An After Visit Summary and PPPS were made available to the patient.

## 2024-04-02 ENCOUNTER — READMISSION MANAGEMENT (OUTPATIENT)
Dept: CALL CENTER | Facility: HOSPITAL | Age: 69
End: 2024-04-02
Payer: MEDICARE

## 2024-04-02 NOTE — OUTREACH NOTE
Prep Survey      Flowsheet Row Responses   [employer] facility patient discharged from? Non-BH   Is LACE score < 7 ? Non-BH Discharge   Eligibility Not Eligible   What are the reasons patient is not eligible? [facility]   Does the patient have one of the following disease processes/diagnoses(primary or secondary)? Other   Prep survey completed? Yes            Veronica Gonzalez Registered Nurse

## 2024-04-11 DIAGNOSIS — M62.838 MUSCLE SPASM OF LEFT LOWER EXTREMITY: ICD-10-CM

## 2024-04-11 RX ORDER — KETOCONAZOLE 20 MG/ML
SHAMPOO TOPICAL
Qty: 120 ML | Refills: 0 | Status: SHIPPED | OUTPATIENT
Start: 2024-04-11

## 2024-04-11 NOTE — TELEPHONE ENCOUNTER
Rx Refill Note  Requested Prescriptions     Pending Prescriptions Disp Refills    baclofen (LIORESAL) 20 MG tablet [Pharmacy Med Name: BACLOFEN 20MG TABS] 360 tablet 0     Sig: To be filled by Provider      Last office visit with prescribing clinician: 2/16/2024   Last telemedicine visit with prescribing clinician: Visit date not found   Next office visit with prescribing clinician: 8/16/2024                         Would you like a call back once the refill request has been completed: [] Yes [] No    If the office needs to give you a call back, can they leave a voicemail: [] Yes [] No    Caroline Marley MA  04/11/24, 15:01 EDT

## 2024-04-12 RX ORDER — BACLOFEN 20 MG/1
TABLET ORAL
Qty: 360 TABLET | Refills: 0 | Status: SHIPPED | OUTPATIENT
Start: 2024-04-12

## 2024-04-29 ENCOUNTER — TRANSITIONAL CARE MANAGEMENT TELEPHONE ENCOUNTER (OUTPATIENT)
Dept: CALL CENTER | Facility: HOSPITAL | Age: 69
End: 2024-04-29
Payer: MEDICARE

## 2024-04-29 ENCOUNTER — READMISSION MANAGEMENT (OUTPATIENT)
Dept: CALL CENTER | Facility: HOSPITAL | Age: 69
End: 2024-04-29
Payer: MEDICARE

## 2024-04-29 NOTE — OUTREACH NOTE
Call Center TCM Note      Flowsheet Row Responses   [facility] patient discharged from? Non-BH   Does the patient have one of the following disease processes/diagnoses(primary or secondary)? Other   TCM attempt successful? No   Unsuccessful attempts Attempt 1            Alexandru Barahona RN    4/29/2024, 15:01 EDT

## 2024-04-29 NOTE — OUTREACH NOTE
Call Center TCM Note      Flowsheet Row Responses   [facility] patient discharged from? Non-   Does the patient have one of the following disease processes/diagnoses(primary or secondary)? Other   TCM attempt successful? No   Unsuccessful attempts Attempt 2            Alexandru Barahona RN    4/29/2024, 15:24 EDT

## 2024-04-29 NOTE — OUTREACH NOTE
Prep Survey      Flowsheet Row Responses   [employer] facility patient discharged from? Non-BH   Is LACE score < 7 ? Non-BH Discharge   Eligibility [facility]   Date of Discharge 04/28/24   Discharge Disposition [facility]   Discharge diagnosis Unspecified fracture of lower end of left tibia, subsequent encounter for closed fracture with routine healing   Does the patient have one of the following disease processes/diagnoses(primary or secondary)? Other   Prep survey completed? Yes            Venus ORTIZ - Registered Nurse

## 2024-04-30 ENCOUNTER — TRANSITIONAL CARE MANAGEMENT TELEPHONE ENCOUNTER (OUTPATIENT)
Dept: CALL CENTER | Facility: HOSPITAL | Age: 69
End: 2024-04-30
Payer: MEDICARE

## 2024-04-30 NOTE — OUTREACH NOTE
Call Center TCM Note      Flowsheet Row Responses   [facility] [place] patient discharged from? Non-BH   Does the patient have one of the following disease processes/diagnoses(primary or secondary)? Other   TCM attempt successful? No   Unsuccessful attempts Attempt 3            Latoya Echevarria RN    4/30/2024, 16:05 EDT

## 2024-05-03 ENCOUNTER — READMISSION MANAGEMENT (OUTPATIENT)
Dept: CALL CENTER | Facility: HOSPITAL | Age: 69
End: 2024-05-03
Payer: MEDICARE

## 2024-05-04 ENCOUNTER — TRANSITIONAL CARE MANAGEMENT TELEPHONE ENCOUNTER (OUTPATIENT)
Dept: CALL CENTER | Facility: HOSPITAL | Age: 69
End: 2024-05-04
Payer: MEDICARE

## 2024-05-04 NOTE — OUTREACH NOTE
Prep Survey      Flowsheet Row Responses   [employer] facility patient discharged from? Non-BH   Is LACE score < 7 ? Non-BH Discharge   Eligibility [facility]   Date of Admission 04/30/24   Date of Discharge 05/03/24   Discharge Disposition Home or Self Care   Discharge diagnosis Encephalopathy, UTI   Does the patient have one of the following disease processes/diagnoses(primary or secondary)? Other   Does the patient have Home health ordered? No   Prep survey completed? Yes            Veronica Gonzalez Registered Nurse

## 2024-05-04 NOTE — OUTREACH NOTE
Call Center TCM Note      Flowsheet Row Responses   [facility] [place] patient discharged from? Non-BH   Does the patient have one of the following disease processes/diagnoses(primary or secondary)? Other   TCM attempt successful? No   Unsuccessful attempts Attempt 2            Marissa Nash RN    5/4/2024, 11:38 EDT

## 2024-05-04 NOTE — OUTREACH NOTE
Call Center TCM Note      Flowsheet Row Responses   [facility] patient discharged from? Non-BH   Does the patient have one of the following disease processes/diagnoses(primary or secondary)? Other   TCM attempt successful? No   Unsuccessful attempts Attempt 1            Marissa Nash RN    5/4/2024, 09:31 EDT

## 2024-05-06 ENCOUNTER — TRANSITIONAL CARE MANAGEMENT TELEPHONE ENCOUNTER (OUTPATIENT)
Dept: CALL CENTER | Facility: HOSPITAL | Age: 69
End: 2024-05-06
Payer: MEDICARE

## 2024-05-18 ENCOUNTER — READMISSION MANAGEMENT (OUTPATIENT)
Dept: CALL CENTER | Facility: HOSPITAL | Age: 69
End: 2024-05-18
Payer: MEDICARE

## 2024-05-20 ENCOUNTER — DOCUMENTATION (OUTPATIENT)
Dept: FAMILY MEDICINE CLINIC | Facility: CLINIC | Age: 69
End: 2024-05-20
Payer: MEDICARE

## 2024-05-20 RX ORDER — DOXYCYCLINE 100 MG/1
100 CAPSULE ORAL 2 TIMES DAILY
COMMUNITY

## 2024-05-20 RX ORDER — CARVEDILOL 25 MG/1
25 TABLET ORAL 2 TIMES DAILY WITH MEALS
COMMUNITY

## 2024-05-20 RX ORDER — CEFDINIR 300 MG/1
300 CAPSULE ORAL 2 TIMES DAILY
COMMUNITY

## 2024-05-20 RX ORDER — POLYETHYLENE GLYCOL 3350 17 G/17G
17 POWDER, FOR SOLUTION ORAL DAILY
COMMUNITY

## 2024-05-20 RX ORDER — CITALOPRAM 20 MG/1
40 TABLET ORAL DAILY
COMMUNITY

## 2024-05-20 RX ORDER — OYSTER SHELL CALCIUM WITH VITAMIN D 500; 200 MG/1; [IU]/1
1 TABLET, FILM COATED ORAL 2 TIMES DAILY
COMMUNITY

## 2024-05-20 RX ORDER — DOCUSATE SODIUM 100 MG/1
100 CAPSULE, LIQUID FILLED ORAL DAILY
COMMUNITY

## 2024-05-20 RX ORDER — PANTOPRAZOLE SODIUM 40 MG/1
40 TABLET, DELAYED RELEASE ORAL DAILY
COMMUNITY

## 2024-05-20 RX ORDER — NITROGLYCERIN 0.4 MG/1
0.4 TABLET SUBLINGUAL
COMMUNITY

## 2024-05-20 RX ORDER — OXYCODONE HYDROCHLORIDE 5 MG/1
5 TABLET ORAL EVERY 6 HOURS PRN
COMMUNITY

## 2024-05-20 RX ORDER — CLOPIDOGREL BISULFATE 75 MG/1
75 TABLET ORAL DAILY
COMMUNITY

## 2024-05-20 RX ORDER — LISINOPRIL 10 MG/1
10 TABLET ORAL 2 TIMES DAILY
COMMUNITY

## 2024-06-05 ENCOUNTER — TELEPHONE (OUTPATIENT)
Dept: FAMILY MEDICINE CLINIC | Facility: CLINIC | Age: 69
End: 2024-06-05
Payer: MEDICARE

## 2024-06-05 NOTE — TELEPHONE ENCOUNTER
KARENA WILD AT [facility], SHE WAS CALLING TO GET A VERBAL CONFIRMATION FOR THE PT'S HOME ORDERS FROM HER PCP. CALL BACK NUMBER: [phone]

## 2024-06-08 ENCOUNTER — READMISSION MANAGEMENT (OUTPATIENT)
Dept: CALL CENTER | Facility: HOSPITAL | Age: 69
End: 2024-06-08
Payer: MEDICARE

## 2024-06-08 NOTE — OUTREACH NOTE
Prep Survey      Flowsheet Row Responses   [employer] facility patient discharged from? Non-BH   Is LACE score < 7 ? Non-BH Discharge   Eligibility [facility]   Date of Discharge 06/08/24   Discharge Disposition Home-Health Care [facility]   Discharge diagnosis Hemiplegia and hemiparesis following cerebral infarction affecting left non-dominant side   Does the patient have one of the following disease processes/diagnoses(primary or secondary)? Other   Does the patient have Home health ordered? Yes   Is there a DME ordered? No   Prep survey completed? Yes            HARMAN BAKER - Registered Nurse

## 2024-06-10 ENCOUNTER — TRANSITIONAL CARE MANAGEMENT TELEPHONE ENCOUNTER (OUTPATIENT)
Dept: CALL CENTER | Facility: HOSPITAL | Age: 69
End: 2024-06-10
Payer: MEDICARE

## 2024-06-10 ENCOUNTER — LAB (OUTPATIENT)
Dept: LAB | Facility: HOSPITAL | Age: 69
End: 2024-06-10
Payer: MEDICARE

## 2024-06-10 ENCOUNTER — OFFICE VISIT (OUTPATIENT)
Dept: FAMILY MEDICINE CLINIC | Facility: CLINIC | Age: 69
End: 2024-06-10
Payer: MEDICARE

## 2024-06-10 VITALS
HEART RATE: 61 BPM | SYSTOLIC BLOOD PRESSURE: 130 MMHG | BODY MASS INDEX: 18.61 KG/M2 | WEIGHT: 109 LBS | DIASTOLIC BLOOD PRESSURE: 78 MMHG | TEMPERATURE: 97.8 F | HEIGHT: 64 IN | OXYGEN SATURATION: 97 %

## 2024-06-10 DIAGNOSIS — G93.40 ENCEPHALOPATHY: Primary | ICD-10-CM

## 2024-06-10 DIAGNOSIS — R73.9 HYPERGLYCEMIA: ICD-10-CM

## 2024-06-10 DIAGNOSIS — E87.1 HYPONATREMIA: ICD-10-CM

## 2024-06-10 LAB
ALBUMIN SERPL-MCNC: 3.9 G/DL (ref 3.5–5.2)
ALBUMIN/GLOB SERPL: 1.1 G/DL
ALP SERPL-CCNC: 108 U/L (ref 39–117)
ALT SERPL W P-5'-P-CCNC: 21 U/L (ref 1–33)
ANION GAP SERPL CALCULATED.3IONS-SCNC: 12.8 MMOL/L (ref 5–15)
AST SERPL-CCNC: 20 U/L (ref 1–32)
BILIRUB SERPL-MCNC: 0.2 MG/DL (ref 0–1.2)
BUN SERPL-MCNC: 11 MG/DL (ref 8–23)
BUN/CREAT SERPL: 20.4 (ref 7–25)
CALCIUM SPEC-SCNC: 9.7 MG/DL (ref 8.6–10.5)
CHLORIDE SERPL-SCNC: 92 MMOL/L (ref 98–107)
CO2 SERPL-SCNC: 21.2 MMOL/L (ref 22–29)
CREAT SERPL-MCNC: 0.54 MG/DL (ref 0.57–1)
EGFRCR SERPLBLD CKD-EPI 2021: 100.4 ML/MIN/1.73
GLOBULIN UR ELPH-MCNC: 3.5 GM/DL
GLUCOSE SERPL-MCNC: 114 MG/DL (ref 65–99)
HBA1C MFR BLD: 5.9 % (ref 4.8–5.6)
POTASSIUM SERPL-SCNC: 4.5 MMOL/L (ref 3.5–5.2)
PROT SERPL-MCNC: 7.4 G/DL (ref 6–8.5)
SODIUM SERPL-SCNC: 126 MMOL/L (ref 136–145)

## 2024-06-10 PROCEDURE — 1111F DSCHRG MED/CURRENT MED MERGE: CPT | Performed by: FAMILY MEDICINE

## 2024-06-10 PROCEDURE — 99495 TRANSJ CARE MGMT MOD F2F 14D: CPT | Performed by: FAMILY MEDICINE

## 2024-06-10 PROCEDURE — 83036 HEMOGLOBIN GLYCOSYLATED A1C: CPT

## 2024-06-10 PROCEDURE — 3075F SYST BP GE 130 - 139MM HG: CPT | Performed by: FAMILY MEDICINE

## 2024-06-10 PROCEDURE — 1125F AMNT PAIN NOTED PAIN PRSNT: CPT | Performed by: FAMILY MEDICINE

## 2024-06-10 PROCEDURE — 80053 COMPREHEN METABOLIC PANEL: CPT

## 2024-06-10 PROCEDURE — 3078F DIAST BP <80 MM HG: CPT | Performed by: FAMILY MEDICINE

## 2024-06-10 RX ORDER — OXYBUTYNIN CHLORIDE 10 MG/1
TABLET, EXTENDED RELEASE ORAL
COMMUNITY
Start: 2024-06-05

## 2024-06-10 NOTE — PROGRESS NOTES
Hospital Follow-Up/Transition of Care Visit     Patient Name: Johnna Contreras  : 1955   MRN: [id number]   Care Team: Patient Care Team:  Ras Padilla DO as PCP - General (Family Medicine)  Case, Sobeida ENCINAS DO as Consulting Physician (Pulmonary Disease)  Zackery Garcia DO (Long Term Care Facility)    Chief Complaint:    Chief Complaint   Patient presents with    Hospital Follow Up Visit       History of Present Illness: Johnna Contreras is a 69 y.o. female who presents today for TCM visit.    Within 48 business hours after discharge our office contacted her via telephone to coordinate her care and needs.      I reviewed and discussed the details of that call along with the discharge summary, hospital problems, inpatient lab results, inpatient diagnostic studies, and consultation reports with Johnna.      Current outpatient and discharge medications have been reconciled for the patient.  Reviewed by: Ras Padilla DO          2024     5:29 AM 5/3/2024    10:22 PM 2024     2:52 PM   Date of TCM Phone Call   [facility] -    Date of Admission  2024    Date of Discharge 2024 5/3/2024 2024   Discharge Disposition Home-Health Care Svc Home or Self Care Home-Health Care Sv     Risk for Readmission (LACE) No data recorded    History of Present Illness   Course During Hospital Stay:    Johnna Contreras is a 68 y.o. female with a history of COPD, hypertension, prior CVA with residual left hemiparesis who presents to [facility] in [place] after being found unresponsive at home. She was discharged from this facility on 2024 after a very similar episode that required intubation. During that hospitalization she was treated for UTI and discharged home on oral antibiotics (cefdinir). No known inciting events, constant, no relieving factors.  In the ED she was encephalopathic, having periods of apnea so she was once again intubated. She has been normotensive, normoxic, not tachycardic or tachypneic. Labs reviewed: ABG pH 7.39, pCO2 37, pO2 68>>114. WBC 11 K, hemoglobin 13, , K3.9, creatinine 1.13, UA not suggestive of infection, UDS negative except for opiates which she is prescribed. She did not improve with naloxone. Will admit for neurology to reevaluate.    Hospital Course:    This is a 68 years old female with significant past medical history of COPD who was admitted for acute hypoxic respiratory failure secondary to COPD exacerbation. Patient was intubated on 5/4 for airway protection, was reportedly having apneic episodes and was minimally responsive.     MRI brain showed restricted diffusion within the extreme anterior superior right cerebellar hemisphere consistent with a small focus of ischemia. There is questionable punctate restricted diffusion within the superio left medulla.  Old lacunar infarct in the right thalamus with Wallerian degeneration of the right aspect of the donavon.patient was started on aspirin, statin and Plavix, neurology was consulted. Patient was started on broad-spectrum IV antibiotics and steroids,Pneumonia PCR + E. coli, coronavirus her condition improved gradually and She developed intermittent bradycardia from being on sedation in the ICU, EP cardiology was consulted,Echocardiogram un remarkable she was successfully extubated on 5/8. patient was transferred out of the ICU Also, Found to have Acute, nondisplaced fracture of the distal tibial Metaphysis.Orthopedic consulted. Was evaluated by physical therapy. She was found to have hyponatremia, staretd on Urea, recommend follow up with nephrology as out patient. Over all clinical condition improving, will be discharged to [facility].     Status Since Discharge:  Therapy will start Tuesday-Wednesday     The following portions of the patient's history were  reviewed and updated as appropriate: allergies, current medications, past family history, past medical history, past social history, past surgical history, and problem list.    This patient is accompanied by their self who contributes to the history of their care.    The following portions of the patient's history were reviewed and updated as appropriate: allergies, current medications, past family history, past medical history, past social history, past surgical history and problem list.    Subjective      Review of Systems:   Review of Systems - See HPI    Past Medical History:   Past Medical History:   Diagnosis Date    Acute CVA (cerebrovascular accident)     Acute hypoxic respiratory failure     Acute metabolic encephalopathy     PAM (acute kidney injury)     Back problem     Bradycardia     Bronchitis     Claustrophobia     Constipation, unspecified     COPD exacerbation     COVID-19     Depression     GERD (gastroesophageal reflux disease)     Headache     Hemiplegia, unspecified affecting left dominant side     History of ectopic pregnancy     Hyperlipidemia     Hypertension     Ileus     Muscle weakness (generalized)     Pneumonia due to E. coli     Seizures     last seizure at the age of 21    Stroke        Past Surgical History:   Past Surgical History:   Procedure Laterality Date    DILATATION AND CURETTAGE      EYE SURGERY      TONSILLECTOMY      TUBAL ABDOMINAL LIGATION         Family History:   Family History   Problem Relation Age of Onset    Heart attack Mother     Stroke Mother     Hypertension Mother     Hyperlipidemia Mother     Diabetes Father     Breast cancer Paternal Grandmother     Cancer Paternal Grandmother     Stroke Paternal Grandmother     Heart attack Paternal Grandmother     Ovarian cancer Neg Hx        Social History:   Social History     Socioeconomic History    Marital status:    Tobacco Use    Smoking status: Every Day     Current packs/day: 1.00     Average packs/day: 1  pack/day for 50.0 years (50.0 ttl pk-yrs)     Types: Cigarettes    Smokeless tobacco: Never   Vaping Use    Vaping status: Never Used   Substance and Sexual Activity    Alcohol use: Yes     Comment: rarely     Drug use: No    Sexual activity: Defer       Tobacco History:   Social History     Tobacco Use   Smoking Status Every Day    Current packs/day: 1.00    Average packs/day: 1 pack/day for 50.0 years (50.0 ttl pk-yrs)    Types: Cigarettes   Smokeless Tobacco Never       Medications:     Current Outpatient Medications:     aspirin 81 MG tablet, Take 1 tablet by mouth Daily., Disp: , Rfl:     Calcium Carbonate-Vitamin D 500-5 MG-MCG tablet, Take 1 tablet by mouth 2 (Two) Times a Day., Disp: , Rfl:     carvedilol (COREG) 25 MG tablet, Take 1 tablet by mouth 2 (Two) Times a Day With Meals., Disp: , Rfl:     citalopram (CeleXA) 20 MG tablet, Take 2 tablets by mouth Daily., Disp: , Rfl:     clopidogrel (PLAVIX) 75 MG tablet, Take 1 tablet by mouth Daily., Disp: , Rfl:     EPINEPHrine (EPIPEN) 0.3 MG/0.3ML solution auto-injector injection, , Disp: , Rfl:     Fluticasone-Umeclidin-Vilant (Trelegy Ellipta) 200-62.5-25 MCG/ACT aerosol powder , Inhale 1 puff Daily., Disp: 1 each, Rfl: 6    lisinopril (PRINIVIL,ZESTRIL) 10 MG tablet, Take 1 tablet by mouth 2 (Two) Times a Day., Disp: , Rfl:     multivitamin with minerals tablet tablet, Take 1 tablet by mouth Daily., Disp: , Rfl:     nitroglycerin (NITROSTAT) 0.4 MG SL tablet, Place 1 tablet under the tongue Every 5 (Five) Minutes As Needed for Chest Pain. Take no more than 3 doses in 15 minutes., Disp: , Rfl:     omeprazole (priLOSEC) 40 MG capsule, TAKE 1 CAPSULE TWICE DAILY, Disp: 180 capsule, Rfl: 3    oxybutynin XL (DITROPAN-XL) 10 MG 24 hr tablet, , Disp: , Rfl:     oxyCODONE (ROXICODONE) 5 MG immediate release tablet, Take 1 tablet by mouth Every 6 (Six) Hours As Needed for Moderate Pain., Disp: , Rfl:     pantoprazole (PROTONIX) 40 MG EC tablet, Take 1 tablet by mouth  "Daily., Disp: , Rfl:     polyethylene glycol (MiraLax) 17 GM/SCOOP powder, Take 17 g by mouth Daily., Disp: , Rfl:     Urea (URE-NA) 15 g pack packet, Take 15 g by mouth 2 (Two) Times a Day., Disp: 60 each, Rfl: 2    docusate sodium (COLACE) 100 MG capsule, Take 1 capsule by mouth Daily. (Patient not taking: Reported on 6/10/2024), Disp: , Rfl:     Allergies:   Allergies   Allergen Reactions    Tree Nut Rash       Objective   Objective     Physical Exam:  Vital Signs:   Vitals:    06/10/24 0922   BP: 130/78   Pulse: 61   Temp: 97.8 °F (36.6 °C)   TempSrc: Infrared   SpO2: 97%   Weight: 49.4 kg (109 lb)   Height: 162.6 cm (64.02\")   PainSc:   6   PainLoc: Leg  Comment: left     Body mass index is 18.7 kg/m².     Physical Exam  Nursing note reviewed  Const: NAD, A&Ox4, Pleasant, Cooperative  Eyes: EOMI, no conjunctivitis  ENT: No nasal discharge present, neck supple  Cardiac: Regular rate and rhythm, no cyanosis  Procedures/Radiology     Procedures  No radiology results for the last 7 days     Assessment & Plan   Assessment / Plan      Assessment/Plan:   Problems Addressed This Visit  Diagnoses and all orders for this visit:    1. Encephalopathy (Primary)    2. Hyperglycemia  -     Comprehensive Metabolic Panel; Future  -     Hemoglobin A1c; Future    3. Hyponatremia  -     Comprehensive Metabolic Panel; Future  -     Urea (URE-NA) 15 g pack packet; Take 15 g by mouth 2 (Two) Times a Day.  Dispense: 60 each; Refill: 2      Problem List Items Addressed This Visit    None  Visit Diagnoses       Encephalopathy    -  Primary    Hyperglycemia        Relevant Orders    Comprehensive Metabolic Panel (Completed)    Hemoglobin A1c (Completed)    Hyponatremia        Relevant Medications    Urea (URE-NA) 15 g pack packet    Other Relevant Orders    Comprehensive Metabolic Panel (Completed)            She still has rather severe hyponatremia, sodium today 126.  This is acutely down from her level in February when she was at 140, " although about where she was at [facility].  She should continue her Ure-Na, refill sent to her pharmacy today.  Continue to avoid any diuretics.    See patient diagnoses and orders along with patient instructions for assessment, plan, and changes to care for patient.    There are no Patient Instructions on file for this visit.    Follow Up:   No follow-ups on file.        MDM       [facility] [address]  [facility]  [address]  [facility] [address]  Fax [phone]  Phone [phone]

## 2024-06-10 NOTE — OUTREACH NOTE
Call Center TCM Note      Flowsheet Row Responses   [facility] patient discharged from? Non-BH   Does the patient have one of the following disease processes/diagnoses(primary or secondary)? Other   TCM attempt successful? Yes   Revoked Reason Other  [Per records, pt had a TCM /hosp dc fu apt today (6/10/24).  This apt satisfies the TCM requirement.]   Discharge diagnosis Hemiplegia and hemiparesis following cerebral infarction affecting left non-dominant side            Lesli Tan RN    6/10/2024, 10:41 EDT

## 2024-06-13 ENCOUNTER — TELEPHONE (OUTPATIENT)
Dept: FAMILY MEDICINE CLINIC | Facility: CLINIC | Age: 69
End: 2024-06-13
Payer: MEDICARE

## 2024-06-13 NOTE — TELEPHONE ENCOUNTER
Unable to reach AARON Sánchez for return call.    Dr Padilla approved the verbal orders for HH and that he will follow.   He was unaware that the patient had an injury and/or wound.    Please relay.

## 2024-06-13 NOTE — TELEPHONE ENCOUNTER
Caller: RAIN    Relationship: Home Health    Best call back number: [phone]     What orders are you requesting (i.e. lab or imaging):  FOR THE PCP TO FOLLOW HOME HEALTH ORDERS    In what timeframe would the patient need to come in: ASAP    Where will you receive your lab/imaging services: IN PATIENT HOME    Additional notes: RAIN CALLING FROM [facility] TO SEE IF THE PROVIDER WILL FOLLOW HOME HEALTH ORDERS FOR THE PATIENT.    THE PATIENT HAS A WOUND ON THE LEFT FOOT THAT HAD NO DOCUMENTATION  ON THE INJURY AND [facility] NEED KNOW IF DR. QUEZADA SPOKE WITH THE PATIENT ABOUT THE WOUND ON THE FOOT.    [facility] NEED THE EDOLOGY ABOUT THE INJURY AS THEY WERE NOT AWARE OF THE INJURY.    PATIENT WAS ALSO ADMITTED BACK TO THE HOSPITAL ON 06/12/24 AT  DUE TO A FALL DUE TO ALTERED MENTAL STATUS

## 2024-06-14 NOTE — TELEPHONE ENCOUNTER
Unable to reach AARON Nelson for return call.     Dr Padilla approved the verbal orders for HH and that he will follow.   He was unaware that the patient had an injury and/or wound.     Please relay.

## 2024-06-17 NOTE — TELEPHONE ENCOUNTER
Left detailed message regarding below:  Dr Padilla approved the verbal orders for HH and that he will follow.   He was unaware that the patient had an injury and/or wound.

## 2024-06-24 ENCOUNTER — DOCUMENTATION (OUTPATIENT)
Dept: FAMILY MEDICINE CLINIC | Facility: CLINIC | Age: 69
End: 2024-06-24
Payer: MEDICARE

## 2024-06-24 RX ORDER — LANOLIN ALCOHOL/MO/W.PET/CERES
9 CREAM (GRAM) TOPICAL NIGHTLY
COMMUNITY

## 2024-06-24 RX ORDER — BISACODYL 5 MG/1
10 TABLET, DELAYED RELEASE ORAL DAILY
COMMUNITY

## 2024-06-24 RX ORDER — IBUPROFEN 400 MG/1
400 TABLET ORAL EVERY 4 HOURS PRN
COMMUNITY

## 2024-06-24 RX ORDER — NIFEDIPINE 30 MG/1
30 TABLET, EXTENDED RELEASE ORAL DAILY
COMMUNITY

## 2024-06-24 RX ORDER — ACETAMINOPHEN 650 MG
1 TABLET, EXTENDED RELEASE ORAL AS NEEDED
COMMUNITY

## 2024-06-24 RX ORDER — ATORVASTATIN CALCIUM 20 MG/1
20 TABLET, FILM COATED ORAL NIGHTLY
COMMUNITY

## 2024-06-24 RX ORDER — HYDROCODONE BITARTRATE AND ACETAMINOPHEN 5; 325 MG/1; MG/1
1 TABLET ORAL EVERY 6 HOURS PRN
COMMUNITY

## 2024-06-24 RX ORDER — TIOTROPIUM BROMIDE INHALATION SPRAY 3.12 UG/1
2 SPRAY, METERED RESPIRATORY (INHALATION)
COMMUNITY

## 2024-06-24 RX ORDER — OMEGA-3S/DHA/EPA/FISH OIL/D3 300MG-1000
400 CAPSULE ORAL DAILY
COMMUNITY

## 2024-06-24 RX ORDER — SENNOSIDES A AND B 8.6 MG/1
1 TABLET, FILM COATED ORAL DAILY
COMMUNITY

## 2024-06-26 NOTE — PROGRESS NOTES
Nursing Home History and Physical       Zackery Garcia DO  [address] [place] [address] Phone: [phone]  Fax: [phone]     PATIENT NAME: Johnna Contreras                                                                          YOB: 1955           DATE OF SERVICE: 06/27/2024  FACILITY:  [facility]    CHIEF COMPLAINT:  Nursing facility admission      HISTORY OF PRESENT ILLNESS:   Patient is a 69-year-old female with a history of COPD, left-sided hemiparesis due to CVA, depression, and GERD who was recently hospitalized at [facility] for altered mental status as well as unwitnessed fall.  Trauma workup was only positive for hematoma of left forehead.  Due to altered mental status and confusion patient was hospitalized.  She was noted to have significant hyponatremia with suspicion for SIADH.  Patient was treated with IV fluids with improvement.  Chronic hyponatremia was noted as a concern.  Blood pressure medications were adjusted due to concerns of hypertension as well as bradycardia with beta-blocker.  Patient was also noted to have a distal tibial fracture which was noted prior to this recent admission.  Due to significant debility and weakness patient has transferred to this facility for further care.    On exam today, patient was resting comfortably.  In her bed with no specific concerns.  She did mention that she continued to have some pain associated with the hematoma on her forehead.  She seemed motivated to work with physical therapy.      PAST MEDICAL & SURGICAL HISTORY:   Past Medical History:   Diagnosis Date    Acute CVA (cerebrovascular accident)     Acute encephalopathy     Acute hypoxic respiratory failure     Acute metabolic encephalopathy     PAM (acute kidney injury)     Back problem     Bradycardia     Bronchitis     Claustrophobia     Constipation, unspecified     COPD exacerbation     COVID-19     Depression     Fall from wheelchair     GERD  (gastroesophageal reflux disease)     Headache     Hemiplegia, unspecified affecting left dominant side     History of ectopic pregnancy     Hyperlipidemia     Hypertension     Ileus     Muscle weakness (generalized)     Personal history of other endocrine, nutritional and metabolic disease     Pneumonia due to E. coli     Seizures     last seizure at the age of 21    Stroke     Tobacco use     Traumatic hematoma of forehead       Past Surgical History:   Procedure Laterality Date    DILATATION AND CURETTAGE      EYE SURGERY      TONSILLECTOMY      TUBAL ABDOMINAL LIGATION           MEDICATIONS:  I have reviewed and reconciled the patients medication list in the patients chart at the [facility] nursing [facility] on 06/27/2024.      ALLERGIES:  Allergies   Allergen Reactions    Tree Nut Rash         SOCIAL HISTORY:  Social History     Socioeconomic History    Marital status:    Tobacco Use    Smoking status: Every Day     Current packs/day: 1.00     Average packs/day: 1 pack/day for 50.0 years (50.0 ttl pk-yrs)     Types: Cigarettes    Smokeless tobacco: Never   Vaping Use    Vaping status: Never Used   Substance and Sexual Activity    Alcohol use: Yes     Comment: rarely     Drug use: No    Sexual activity: Defer       FAMILY HISTORY:  Family History   Problem Relation Age of Onset    Heart attack Mother     Stroke Mother     Hypertension Mother     Hyperlipidemia Mother     Diabetes Father     Breast cancer Paternal Grandmother     Cancer Paternal Grandmother     Stroke Paternal Grandmother     Heart attack Paternal Grandmother     Ovarian cancer Neg Hx         REVIEW OF SYSTEMS:  Review of Systems   Constitutional:  Negative for chills, fatigue and fever.   HENT:  Negative for congestion, ear pain, rhinorrhea, sinus pressure and sore throat.    Eyes:  Negative for visual disturbance.   Respiratory:  Negative for cough, chest tightness, shortness of breath and wheezing.    Cardiovascular:  Negative for chest  pain, palpitations and leg swelling.   Gastrointestinal:  Negative for abdominal pain, blood in stool, constipation, diarrhea, nausea and vomiting.   Endocrine: Negative for polydipsia and polyuria.   Genitourinary:  Negative for dysuria and hematuria.   Musculoskeletal:  Negative for arthralgias and back pain.   Skin:  Negative for rash.   Neurological:  Negative for dizziness, light-headedness, numbness and headaches.   Psychiatric/Behavioral:  Negative for dysphoric mood and sleep disturbance. The patient is not nervous/anxious.          PHYSICAL EXAMINATION:   VITAL SIGNS: /78   Pulse 84   Temp 98.1 °F (36.7 °C)   Resp 18   Wt 47.2 kg (104 lb 1.6 oz)   SpO2 97%   BMI 17.86 kg/m²     Physical Exam  Vitals and nursing note reviewed.   Constitutional:       Appearance: Normal appearance. She is well-developed.   HENT:      Head: Normocephalic and atraumatic.      Nose: Nose normal.      Mouth/Throat:      Mouth: Mucous membranes are moist.      Pharynx: No oropharyngeal exudate.   Eyes:      General: No scleral icterus.     Extraocular Movements: Extraocular movements intact.      Conjunctiva/sclera: Conjunctivae normal.      Pupils: Pupils are equal, round, and reactive to light.   Neck:      Thyroid: No thyromegaly.   Cardiovascular:      Rate and Rhythm: Normal rate and regular rhythm.      Heart sounds: Normal heart sounds. No murmur heard.     No friction rub. No gallop.   Pulmonary:      Effort: Pulmonary effort is normal. No respiratory distress.      Breath sounds: Normal breath sounds. No wheezing.   Abdominal:      General: Bowel sounds are normal. There is no distension.      Palpations: Abdomen is soft.      Tenderness: There is no abdominal tenderness.   Musculoskeletal:         General: No deformity or signs of injury.      Cervical back: Normal range of motion and neck supple.   Lymphadenopathy:      Cervical: No cervical adenopathy.   Skin:     General: Skin is warm and dry.       Findings: No rash.   Neurological:      General: No focal deficit present.      Mental Status: She is alert and oriented to person, place, and time.   Psychiatric:         Mood and Affect: Mood normal.         Behavior: Behavior normal.         RECORDS REVIEW:   Discharge Summary [facility] 6/21/24    ASSESSMENT   Diagnoses and all orders for this visit:    1. Debility (Primary)    2. Hyperlipidemia, unspecified hyperlipidemia type    3. Primary hypertension    4. Prediabetes    5. Bursitis of right shoulder    6. Chronic respiratory failure with hypoxia    7. Personal history of tobacco use, presenting hazards to health    8. Moderate COPD (chronic obstructive pulmonary disease)    9. Solid nodule of lung 6 mm to 8 mm in diameter    10. Cerebrovascular accident (CVA), unspecified mechanism        PLAN  Debility/weakness  - Continue PT and OT in rehab facility for strengthening.    CVA with left-sided weakness  - Continue supportive care in nursing facility for ADLs.  Continue PT and OT.    Hypertension  - Patient was started on nifedipine and carvedilol was discontinued.  - Lisinopril dose was increased during hospitalization.    Distal tibial fracture  - Patient is permitted to bear weight per orthopedic recommendations.    Protein calorie malnutrition  - Nutrition services are following.    Hyponatremia  - Monitor sodium levels monthly in nursing facility    GERD  - Continue Protonix daily.        [x]  Discussed Patient in detail with nursing/staff, addressed all needs today.     [x]  Plan of Care Reviewed   [x]  PT/OT Reviewed   []  Order Changes  []  Discharge Plans Reviewed  [x]  Advance Directive on file with Nursing Home.   [x]  POA on file with Nursing Home.    [x]  Code Status listed and reviewed.     Zackery Garcia DO.  6/28/2024      **Part of this note may be an electronic transcription/translation of spoken language to printed text using the Dragon Dictation System.**

## 2024-06-27 ENCOUNTER — NURSING HOME (OUTPATIENT)
Dept: INTERNAL MEDICINE | Facility: CLINIC | Age: 69
End: 2024-06-27
Payer: MEDICARE

## 2024-06-27 DIAGNOSIS — R53.81 DEBILITY: Primary | ICD-10-CM

## 2024-06-27 DIAGNOSIS — J96.11 CHRONIC RESPIRATORY FAILURE WITH HYPOXIA: ICD-10-CM

## 2024-06-27 DIAGNOSIS — M75.51 BURSITIS OF RIGHT SHOULDER: ICD-10-CM

## 2024-06-27 DIAGNOSIS — I63.9 CEREBROVASCULAR ACCIDENT (CVA), UNSPECIFIED MECHANISM: ICD-10-CM

## 2024-06-27 DIAGNOSIS — Z87.891 PERSONAL HISTORY OF TOBACCO USE, PRESENTING HAZARDS TO HEALTH: ICD-10-CM

## 2024-06-27 DIAGNOSIS — R91.1 SOLID NODULE OF LUNG 6 MM TO 8 MM IN DIAMETER: ICD-10-CM

## 2024-06-27 DIAGNOSIS — J44.9 MODERATE COPD (CHRONIC OBSTRUCTIVE PULMONARY DISEASE): ICD-10-CM

## 2024-06-27 DIAGNOSIS — R73.03 PREDIABETES: ICD-10-CM

## 2024-06-27 DIAGNOSIS — I10 PRIMARY HYPERTENSION: ICD-10-CM

## 2024-06-27 DIAGNOSIS — E78.5 HYPERLIPIDEMIA, UNSPECIFIED HYPERLIPIDEMIA TYPE: ICD-10-CM

## 2024-06-27 PROCEDURE — 99305 1ST NF CARE MODERATE MDM 35: CPT | Performed by: INTERNAL MEDICINE

## 2024-06-27 NOTE — LETTER
Nursing Home History and Physical       Zackery Garcia DO  [address] [place] [address] Phone: [phone]  Fax: [phone]     PATIENT NAME: Johnna Contreras                                                                          YOB: 1955           DATE OF SERVICE: 06/27/2024  FACILITY:  [facility]    CHIEF COMPLAINT:  Nursing facility admission      HISTORY OF PRESENT ILLNESS:   Patient is a 69-year-old female with a history of COPD, left-sided hemiparesis due to CVA, depression, and GERD who was recently hospitalized at [facility] for altered mental status as well as unwitnessed fall.  Trauma workup was only positive for hematoma of left forehead.  Due to altered mental status and confusion patient was hospitalized.  She was noted to have significant hyponatremia with suspicion for SIADH.  Patient was treated with IV fluids with improvement.  Chronic hyponatremia was noted as a concern.  Blood pressure medications were adjusted due to concerns of hypertension as well as bradycardia with beta-blocker.  Patient was also noted to have a distal tibial fracture which was noted prior to this recent admission.  Due to significant debility and weakness patient has transferred to this facility for further care.    On exam today, patient was resting comfortably.  In her bed with no specific concerns.  She did mention that she continued to have some pain associated with the hematoma on her forehead.  She seemed motivated to work with physical therapy.      PAST MEDICAL & SURGICAL HISTORY:   Past Medical History:   Diagnosis Date    Acute CVA (cerebrovascular accident)     Acute encephalopathy     Acute hypoxic respiratory failure     Acute metabolic encephalopathy     PAM (acute kidney injury)     Back problem     Bradycardia     Bronchitis     Claustrophobia     Constipation, unspecified     COPD exacerbation     COVID-19     Depression     Fall from wheelchair     GERD  (gastroesophageal reflux disease)     Headache     Hemiplegia, unspecified affecting left dominant side     History of ectopic pregnancy     Hyperlipidemia     Hypertension     Ileus     Muscle weakness (generalized)     Personal history of other endocrine, nutritional and metabolic disease     Pneumonia due to E. coli     Seizures     last seizure at the age of 21    Stroke     Tobacco use     Traumatic hematoma of forehead       Past Surgical History:   Procedure Laterality Date    DILATATION AND CURETTAGE      EYE SURGERY      TONSILLECTOMY      TUBAL ABDOMINAL LIGATION           MEDICATIONS:  I have reviewed and reconciled the patients medication list in the patients chart at the [facility] nursing [facility] on 06/27/2024.      ALLERGIES:  Allergies   Allergen Reactions    Tree Nut Rash         SOCIAL HISTORY:  Social History     Socioeconomic History    Marital status:    Tobacco Use    Smoking status: Every Day     Current packs/day: 1.00     Average packs/day: 1 pack/day for 50.0 years (50.0 ttl pk-yrs)     Types: Cigarettes    Smokeless tobacco: Never   Vaping Use    Vaping status: Never Used   Substance and Sexual Activity    Alcohol use: Yes     Comment: rarely     Drug use: No    Sexual activity: Defer       FAMILY HISTORY:  Family History   Problem Relation Age of Onset    Heart attack Mother     Stroke Mother     Hypertension Mother     Hyperlipidemia Mother     Diabetes Father     Breast cancer Paternal Grandmother     Cancer Paternal Grandmother     Stroke Paternal Grandmother     Heart attack Paternal Grandmother     Ovarian cancer Neg Hx         REVIEW OF SYSTEMS:  Review of Systems   Constitutional:  Negative for chills, fatigue and fever.   HENT:  Negative for congestion, ear pain, rhinorrhea, sinus pressure and sore throat.    Eyes:  Negative for visual disturbance.   Respiratory:  Negative for cough, chest tightness, shortness of breath and wheezing.    Cardiovascular:  Negative for chest  pain, palpitations and leg swelling.   Gastrointestinal:  Negative for abdominal pain, blood in stool, constipation, diarrhea, nausea and vomiting.   Endocrine: Negative for polydipsia and polyuria.   Genitourinary:  Negative for dysuria and hematuria.   Musculoskeletal:  Negative for arthralgias and back pain.   Skin:  Negative for rash.   Neurological:  Negative for dizziness, light-headedness, numbness and headaches.   Psychiatric/Behavioral:  Negative for dysphoric mood and sleep disturbance. The patient is not nervous/anxious.          PHYSICAL EXAMINATION:   VITAL SIGNS: /78   Pulse 84   Temp 98.1 °F (36.7 °C)   Resp 18   Wt 47.2 kg (104 lb 1.6 oz)   SpO2 97%   BMI 17.86 kg/m²     Physical Exam  Vitals and nursing note reviewed.   Constitutional:       Appearance: Normal appearance. She is well-developed.   HENT:      Head: Normocephalic and atraumatic.      Nose: Nose normal.      Mouth/Throat:      Mouth: Mucous membranes are moist.      Pharynx: No oropharyngeal exudate.   Eyes:      General: No scleral icterus.     Extraocular Movements: Extraocular movements intact.      Conjunctiva/sclera: Conjunctivae normal.      Pupils: Pupils are equal, round, and reactive to light.   Neck:      Thyroid: No thyromegaly.   Cardiovascular:      Rate and Rhythm: Normal rate and regular rhythm.      Heart sounds: Normal heart sounds. No murmur heard.     No friction rub. No gallop.   Pulmonary:      Effort: Pulmonary effort is normal. No respiratory distress.      Breath sounds: Normal breath sounds. No wheezing.   Abdominal:      General: Bowel sounds are normal. There is no distension.      Palpations: Abdomen is soft.      Tenderness: There is no abdominal tenderness.   Musculoskeletal:         General: No deformity or signs of injury.      Cervical back: Normal range of motion and neck supple.   Lymphadenopathy:      Cervical: No cervical adenopathy.   Skin:     General: Skin is warm and dry.       Findings: No rash.   Neurological:      General: No focal deficit present.      Mental Status: She is alert and oriented to person, place, and time.   Psychiatric:         Mood and Affect: Mood normal.         Behavior: Behavior normal.         RECORDS REVIEW:   Discharge Summary [facility] 6/21/24    ASSESSMENT   Diagnoses and all orders for this visit:    1. Debility (Primary)    2. Hyperlipidemia, unspecified hyperlipidemia type    3. Primary hypertension    4. Prediabetes    5. Bursitis of right shoulder    6. Chronic respiratory failure with hypoxia    7. Personal history of tobacco use, presenting hazards to health    8. Moderate COPD (chronic obstructive pulmonary disease)    9. Solid nodule of lung 6 mm to 8 mm in diameter    10. Cerebrovascular accident (CVA), unspecified mechanism        PLAN  Debility/weakness  - Continue PT and OT in rehab facility for strengthening.    CVA with left-sided weakness  - Continue supportive care in nursing facility for ADLs.  Continue PT and OT.    Hypertension  - Patient was started on nifedipine and carvedilol was discontinued.  - Lisinopril dose was increased during hospitalization.    Distal tibial fracture  - Patient is permitted to bear weight per orthopedic recommendations.    Protein calorie malnutrition  - Nutrition services are following.    Hyponatremia  - Monitor sodium levels monthly in nursing facility    GERD  - Continue Protonix daily.        [x]  Discussed Patient in detail with nursing/staff, addressed all needs today.     [x]  Plan of Care Reviewed   [x]  PT/OT Reviewed   []  Order Changes  []  Discharge Plans Reviewed  [x]  Advance Directive on file with Nursing Home.   [x]  POA on file with Nursing Home.    [x]  Code Status listed and reviewed.     Zackery Garcia DO.  6/28/2024      **Part of this note may be an electronic transcription/translation of spoken language to printed text using the Dragon Dictation System.**

## 2024-06-28 VITALS
TEMPERATURE: 98.1 F | OXYGEN SATURATION: 97 % | RESPIRATION RATE: 18 BRPM | HEART RATE: 84 BPM | DIASTOLIC BLOOD PRESSURE: 78 MMHG | WEIGHT: 104.1 LBS | SYSTOLIC BLOOD PRESSURE: 122 MMHG | BODY MASS INDEX: 17.86 KG/M2

## 2024-07-05 ENCOUNTER — TRANSITIONAL CARE MANAGEMENT TELEPHONE ENCOUNTER (OUTPATIENT)
Dept: CALL CENTER | Facility: HOSPITAL | Age: 69
End: 2024-07-05
Payer: MEDICARE

## 2024-07-05 ENCOUNTER — READMISSION MANAGEMENT (OUTPATIENT)
Dept: CALL CENTER | Facility: HOSPITAL | Age: 69
End: 2024-07-05
Payer: MEDICARE

## 2024-07-05 NOTE — OUTREACH NOTE
Call Center TCM Note      Flowsheet Row Responses   [facility] [place] patient discharged from? Non-  [facility]   Does the patient have one of the following disease processes/diagnoses(primary or secondary)? Other   TCM attempt successful? Yes   Call start time 1350   Call end time 1358   Discharge diagnosis Encephalopathy   Person spoke with today (if not patient) and relationship spouse, Andrew Contreras   Meds reviewed with patient/caregiver? Yes  [ reports that they have medication list to follow and the meds sent home from nursing home]   Does the patient have all medications ordered at discharge? Yes   Is the patient taking all medications as directed (includes completed medication regime)? Yes   Comments PCP Dr Padilla. Hospital follow up scheduled for 7/15/24  945am with PCP   Does the patient have an appointment with their PCP within 7-14 days of discharge? No   Nursing Interventions Assisted patient with making appointment per protocol, Routed TCM call to PCP office   What is the Home health agency?   reports that he has to call  and let them know that she is home.   Psychosocial issues? No   Did the patient receive a copy of their discharge instructions? Yes   What is the patient's perception of their health status since discharge? Same  [ reports patient ate a good breakfast and has been sleeping today.]   Is the patient/caregiver able to teach back signs and symptoms related to disease process for when to call PCP? Yes   Is the patient/caregiver able to teach back signs and symptoms related to disease process for when to call 911? Yes   Is the patient/caregiver able to teach back the hierarchy of who to call/visit for symptoms/problems? PCP, Specialist, Home health nurse, Urgent Care, ED, 911 Yes   TCM call completed? Yes   Call end time 1358   Would this patient benefit from a Referral to [facility] Social Work? No   Is the patient interested in additional calls from an  ambulatory ? No            Lynda Marroquin, RN    7/5/2024, 14:01 EDT

## 2024-07-15 ENCOUNTER — TELEPHONE (OUTPATIENT)
Dept: FAMILY MEDICINE CLINIC | Facility: CLINIC | Age: 69
End: 2024-07-15

## 2024-07-15 ENCOUNTER — READMISSION MANAGEMENT (OUTPATIENT)
Dept: CALL CENTER | Facility: HOSPITAL | Age: 69
End: 2024-07-15
Payer: MEDICARE

## 2024-07-15 NOTE — TELEPHONE ENCOUNTER
Caller: TORRI AT [facility]    Relationship to patient: Other    Best call back number: [phone]     New or established patient?  [] New  [x] Established    Date of discharge:07/15/2024    Facility discharged from:  [facility]    Diagnosis/Symptoms: ACUTE METABOLIC ENCEPHALOPATHY    Length of stay (If applicable): 07/05/2024-07/15/2024    Specialty Only: Did you see a [employer] health provider?    [] Yes  [x] No  If so, who? NO

## 2024-07-15 NOTE — OUTREACH NOTE
Prep Survey      Flowsheet Row Responses   [facility] facility patient discharged from? Non-BH   Is LACE score < 7 ? Non-BH Discharge   Eligibility [facility]   Date of Admission 07/05/24   Date of Discharge 07/15/24   Discharge Disposition Home or Self Care   Discharge diagnosis ACUTE METABOLIC ENCEPHALOPATHY   Does the patient have one of the following disease processes/diagnoses(primary or secondary)? Other   Prep survey completed? Yes            Venus ORTIZ - Registered Nurse

## 2024-07-16 ENCOUNTER — TRANSITIONAL CARE MANAGEMENT TELEPHONE ENCOUNTER (OUTPATIENT)
Dept: CALL CENTER | Facility: HOSPITAL | Age: 69
End: 2024-07-16
Payer: MEDICARE

## 2024-07-16 NOTE — OUTREACH NOTE
Call Center TCM Note      Flowsheet Row Responses   [facility] patient discharged from? Non-  [[place]]   Does the patient have one of the following disease processes/diagnoses(primary or secondary)? Other   TCM attempt successful? No   Unsuccessful attempts Attempt 2  [attempted patient and spouse listed on PCP verbal release]            Suzan Padilla RN    7/16/2024, 14:26 EDT

## 2024-07-16 NOTE — OUTREACH NOTE
Call Center TCM Note      Flowsheet Row Responses   [facility] patient discharged from? Non-  [[place]]   Does the patient have one of the following disease processes/diagnoses(primary or secondary)? Other   TCM attempt successful? No   Unsuccessful attempts Attempt 1  [attempted patient and spouse listed on PCP verbal release]            Suzan Padilla RN    7/16/2024, 12:38 EDT

## 2024-07-17 ENCOUNTER — TRANSITIONAL CARE MANAGEMENT TELEPHONE ENCOUNTER (OUTPATIENT)
Dept: CALL CENTER | Facility: HOSPITAL | Age: 69
End: 2024-07-17
Payer: MEDICARE

## 2024-07-17 NOTE — OUTREACH NOTE
Call Center TCM Note      Flowsheet Row Responses   [facility] patient discharged from? Non-BH   Does the patient have one of the following disease processes/diagnoses(primary or secondary)? Other   TCM attempt successful? No   Unsuccessful attempts Attempt 3   Call Status Voice mail issues            Mallika Stern RN    7/17/2024, 10:40 EDT

## 2024-08-05 ENCOUNTER — TELEPHONE (OUTPATIENT)
Dept: FAMILY MEDICINE CLINIC | Facility: CLINIC | Age: 69
End: 2024-08-05
Payer: MEDICARE

## 2024-08-05 NOTE — TELEPHONE ENCOUNTER
[facility]  CALLED STATING PATIENT D/C FROM [facility] 8/4/2024; NEED TO KNOW IF DR. PORTILLO WILL SIGN  ORDERS FOR PATIENTS    PH:  [phone]

## 2024-08-19 ENCOUNTER — READMISSION MANAGEMENT (OUTPATIENT)
Dept: CALL CENTER | Facility: HOSPITAL | Age: 69
End: 2024-08-19
Payer: MEDICARE

## 2024-08-19 NOTE — OUTREACH NOTE
Prep Survey      Flowsheet Row Responses   [facility] facility patient discharged from? Non-BH   Is LACE score < 7 ? Non-BH Discharge   Eligibility [facility]   Date of Admission 08/09/24   Date of Discharge 08/19/24   Discharge diagnosis Acute respiratory failure   Does the patient have one of the following disease processes/diagnoses(primary or secondary)? Other   Prep survey completed? Yes            Doreen WISEMAN - Registered Nurse

## 2024-08-20 ENCOUNTER — TRANSITIONAL CARE MANAGEMENT TELEPHONE ENCOUNTER (OUTPATIENT)
Dept: CALL CENTER | Facility: HOSPITAL | Age: 69
End: 2024-08-20
Payer: MEDICARE

## 2024-08-20 ENCOUNTER — TELEPHONE (OUTPATIENT)
Dept: FAMILY MEDICINE CLINIC | Facility: CLINIC | Age: 69
End: 2024-08-20
Payer: MEDICARE

## 2024-08-20 NOTE — OUTREACH NOTE
Call Center TCM Note      Flowsheet Row Responses   [facility] patient discharged from? Non-BH   Does the patient have one of the following disease processes/diagnoses(primary or secondary)? Other   TCM attempt successful? No   Unsuccessful attempts Attempt 2            Toya Saenz RN    8/20/2024, 16:30 EDT

## 2024-08-20 NOTE — OUTREACH NOTE
Call Center TCM Note      Flowsheet Row Responses   [facility] patient discharged from? Non-BH   Does the patient have one of the following disease processes/diagnoses(primary or secondary)? Other   TCM attempt successful? No  [VR listing  and father]   Unsuccessful attempts Attempt 1   Call Status Left message            Toya Saenz RN    8/20/2024, 09:22 EDT

## 2024-08-21 ENCOUNTER — TRANSITIONAL CARE MANAGEMENT TELEPHONE ENCOUNTER (OUTPATIENT)
Dept: CALL CENTER | Facility: HOSPITAL | Age: 69
End: 2024-08-21
Payer: MEDICARE

## 2024-08-21 NOTE — OUTREACH NOTE
Call Center TCM Note      Flowsheet Row Responses   [facility] patient discharged from? Non-  []   Does the patient have one of the following disease processes/diagnoses(primary or secondary)? Other   TCM attempt successful? No   Unsuccessful attempts Attempt 3            Suzan Padilla RN    8/21/2024, 10:08 EDT

## 2024-08-22 ENCOUNTER — TELEPHONE (OUTPATIENT)
Dept: FAMILY MEDICINE CLINIC | Facility: CLINIC | Age: 69
End: 2024-08-22
Payer: MEDICARE

## 2024-08-22 NOTE — TELEPHONE ENCOUNTER
[facility]  called to request Skilled Nursing assessment & PT to assess; patient has wounds  1 week for 8 weeks and verbal for Skilled nursing to assess.    PH:  [phone]

## 2024-08-30 ENCOUNTER — TELEPHONE (OUTPATIENT)
Dept: FAMILY MEDICINE CLINIC | Facility: CLINIC | Age: 69
End: 2024-08-30
Payer: MEDICARE

## 2024-08-30 NOTE — TELEPHONE ENCOUNTER
"  Caller: Johnna Contreras \"Sarah\"    Relationship to patient: Self    Best call back number: [phone]     New or established patient?  [] New  [x] Established    Date of discharge: 08/29/2024     Facility discharged from:  HOSPITAL     Diagnosis/Symptoms: LOW O2 STATS       PLEASE GET HOSPITAL RECORDS FOR PATIENT'S UPCOMING APPOINTMENT 09/09/2024       "

## 2024-08-30 NOTE — TELEPHONE ENCOUNTER
[facility]    2 Follow up Social Work Visits    A message can be left on her secure line.

## 2024-09-04 ENCOUNTER — EXTERNAL PBMM DATA (OUTPATIENT)
Dept: PHARMACY | Facility: OTHER | Age: 69
End: 2024-09-04
Payer: MEDICARE

## 2024-09-05 ENCOUNTER — TELEPHONE (OUTPATIENT)
Dept: FAMILY MEDICINE CLINIC | Facility: CLINIC | Age: 69
End: 2024-09-05
Payer: MEDICARE

## 2024-09-05 NOTE — TELEPHONE ENCOUNTER
"Lisa called back to follow up, I sent Dr Padilla a secure direct message and Let Lisa know I'd call her back as soon as he responds.    He has responded \"ok for verbal\"  "

## 2024-10-09 NOTE — TELEPHONE ENCOUNTER
Caller: Johnna Contreras    Relationship: Self    Best call back number: [phone]    Requested Prescriptions:   Requested Prescriptions      No prescriptions requested or ordered in this encounter        Pharmacy where request should be sent: [facility] - [address] - [phone] [facility] [phone] FX     Additional details provided by patient: PATIENT STATES SHE NEEDS REFILL ON ALL MEDICATION TO THE NEW PHARMACY.  90 DAY SUPPLY PLEASE.      Does the patient have less than a 3 day supply:  [] Yes  [x] No    Millicent Velazquez   08/30/22 16:24 EDT        Body Location Override (Optional - Billing Will Still Be Based On Selected Body Map Location If Applicable): left hand Detail Level: Detailed Depth Of Biopsy: dermis Was A Bandage Applied: Yes Size Of Lesion In Cm: 0 Biopsy Type: H and E Biopsy Method: Dermablade Anesthesia Type: 1% lidocaine with epinephrine Anesthesia Volume In Cc: 0.5 Hemostasis: Drysol Wound Care: Petrolatum Dressing: bandage Destruction After The Procedure: No Type Of Destruction Used: Curettage Curettage Text: The wound bed was treated with curettage after the biopsy was performed. Cryotherapy Text: The wound bed was treated with cryotherapy after the biopsy was performed. Electrodesiccation Text: The wound bed was treated with electrodesiccation after the biopsy was performed. Electrodesiccation And Curettage Text: The wound bed was treated with electrodesiccation and curettage after the biopsy was performed. Silver Nitrate Text: The wound bed was treated with silver nitrate after the biopsy was performed. Lab: [id number] Lab Facility: [id number] Consent: Written consent was obtained and risks were reviewed including but not limited to scarring, infection, bleeding, scabbing, incomplete removal, nerve damage and allergy to anesthesia. Post-Care Instructions: I reviewed with the patient in detail post-care instructions. Patient is to keep the biopsy site dry overnight, and then apply bacitracin twice daily until healed. Patient may apply hydrogen peroxide soaks to remove any crusting. Notification Instructions: Patient will be notified of biopsy results. However, patient instructed to call the office if not contacted within 2 weeks. Billing Type: Third-Party Bill Information: Selecting Yes will display possible errors in your note based on the variables you have selected. This validation is only offered as a suggestion for you. PLEASE NOTE THAT THE VALIDATION TEXT WILL BE REMOVED WHEN YOU FINALIZE YOUR NOTE. IF YOU WANT TO FAX A PRELIMINARY NOTE YOU WILL NEED TO TOGGLE THIS TO 'NO' IF YOU DO NOT WANT IT IN YOUR FAXED NOTE. Body Location Override (Optional - Billing Will Still Be Based On Selected Body Map Location If Applicable): left distal cheek

## 2024-10-14 ENCOUNTER — TELEPHONE (OUTPATIENT)
Dept: FAMILY MEDICINE CLINIC | Facility: CLINIC | Age: 69
End: 2024-10-14
Payer: MEDICARE

## 2024-10-14 NOTE — TELEPHONE ENCOUNTER
Caller: DANISH [facility]    Best call back number: [phone]       What was the call regarding: DANISH WITH [facility] WOULD LIKE FOR A CALL BACK TO GET VERBAL ORDER'S TO SEE PATIENT FOR 1 TIME A WEEK FOR 1 WEEK AND PRECERTIFICATION

## 2024-10-14 NOTE — TELEPHONE ENCOUNTER
[facility] requesting occupational therapy for Johnna once a week for 4 weeks. The number provided is a secure line that can be used to send the verbal order, will lead directly to Bianka Solorzano.  Patient's pulse is 116, Bianka said that it is not too worrying but it is high so she wanted to notify the PCP.

## 2024-10-18 ENCOUNTER — READMISSION MANAGEMENT (OUTPATIENT)
Dept: CALL CENTER | Facility: HOSPITAL | Age: 69
End: 2024-10-18
Payer: MEDICARE

## 2024-10-19 NOTE — OUTREACH NOTE
Prep Survey      Flowsheet Row Responses   [facility] facility patient discharged from? Non-BH   Is LACE score < 7 ? Non- Discharge   Eligibility [facility]   Hospital Altered mental status   Date of Admission 10/16/24   Date of Discharge 10/18/24   Discharge Disposition Home or Self Care   Discharge diagnosis Altered mental status (Primary Dx),   UTI (urinary tract infection)   Does the patient have one of the following disease processes/diagnoses(primary or secondary)? Other   Does the patient have Home health ordered? No   Is there a DME ordered? No   Prep survey completed? Yes            HARMAN BAKER - Registered Nurse

## 2024-10-21 ENCOUNTER — TELEPHONE (OUTPATIENT)
Dept: FAMILY MEDICINE CLINIC | Facility: CLINIC | Age: 69
End: 2024-10-21
Payer: MEDICARE

## 2024-10-21 ENCOUNTER — TRANSITIONAL CARE MANAGEMENT TELEPHONE ENCOUNTER (OUTPATIENT)
Dept: CALL CENTER | Facility: HOSPITAL | Age: 69
End: 2024-10-21
Payer: MEDICARE

## 2024-10-21 NOTE — OUTREACH NOTE
Call Center TCM Note      Flowsheet Row Responses   [facility] patient discharged from? Non-  [[place]]   Does the patient have one of the following disease processes/diagnoses(primary or secondary)? Other   TCM attempt successful? No   Unsuccessful attempts Attempt 1   Change in Health Status Readmitted  [Patient has been admitted to ]            Suzan Padilla RN    10/21/2024, 08:32 EDT

## 2024-10-22 NOTE — TELEPHONE ENCOUNTER
Caller: [facility]    Relationship:     Best call back number: [phone]    What is the best time to reach you: ANY TIME    Who are you requesting to speak with (clinical staff, provider,  specific staff member): DR PORTILLO    Do you know the name of the person who called: [facility]    What was the call regarding: [facility] CALLED AGAIN ASKING IF DR PORTILLO WILL BE FOLLOWING HOME HEALTH ORDERS. PLEASE ADVISE

## 2024-10-24 NOTE — TELEPHONE ENCOUNTER
Caller: [facility]     Relationship:      Best call back number: [phone]     What is the best time to reach you: ANY TIME     Who are you requesting to speak with (clinical staff, provider,  specific staff member): DR PORTILLO     Do you know the name of the person who called: [facility]     What was the call regarding: [facility] CALLED AGAIN ASKING IF DR PORTILLO WILL BE FOLLOWING HOME HEALTH ORDERS. PLEASE ADVISE-3RD CALL

## 2024-10-27 ENCOUNTER — READMISSION MANAGEMENT (OUTPATIENT)
Dept: CALL CENTER | Facility: HOSPITAL | Age: 69
End: 2024-10-27
Payer: MEDICARE

## 2024-10-27 NOTE — OUTREACH NOTE
Prep Survey      Flowsheet Row Responses   [facility] facility patient discharged from? Non-BH   Is LACE score < 7 ? Non-BH Discharge   Eligibility [facility]   Date of Admission 10/19/24   Date of Discharge 10/27/24   Discharge Disposition Home or Self Care   Discharge diagnosis Encephalopathy acute   Does the patient have one of the following disease processes/diagnoses(primary or secondary)? Other   Does the patient have Home health ordered? No   Is there a DME ordered? No   Prep survey completed? Yes            HARMAN BAKER - Registered Nurse

## 2024-10-28 ENCOUNTER — TRANSITIONAL CARE MANAGEMENT TELEPHONE ENCOUNTER (OUTPATIENT)
Dept: CALL CENTER | Facility: HOSPITAL | Age: 69
End: 2024-10-28
Payer: MEDICARE

## 2024-10-28 ENCOUNTER — TELEPHONE (OUTPATIENT)
Dept: FAMILY MEDICINE CLINIC | Facility: CLINIC | Age: 69
End: 2024-10-28
Payer: MEDICARE

## 2024-10-28 NOTE — TELEPHONE ENCOUNTER
Caller: [facility] WITH [employer]    Relationship: Other    Best call back number: [phone]     What is the best time to reach you: ANY    Who are you requesting to speak with (clinical staff, provider,  specific staff member): CLINICAL STAFF    Do you know the name of the person who called: SELF    What was the call regarding: SENT HOME WITH CEFTIN 500 MG, HOSPITAL TOLD HER NOT TO TAKE IT ANYMORE. DOESN'T KNOW IF THAT WAS IN ERROR. PLEASE CALL AND CONFIRM OR CORRECT.

## 2024-10-28 NOTE — OUTREACH NOTE
Call Center TCM Note      Flowsheet Row Responses   [facility] [place] patient discharged from? Non-  []   Does the patient have one of the following disease processes/diagnoses(primary or secondary)? Other   TCM attempt successful? Yes   Call start time 1327   Call end time 1330   Discharge diagnosis Encephalopathy acute   Person spoke with today (if not patient) and relationship patient   Meds reviewed with patient/caregiver? Yes   Is the patient having any side effects they believe may be caused by any medication additions or changes? No   Does the patient have all medications ordered at discharge? Yes   Is the patient taking all medications as directed (includes completed medication regime)? Yes   Comments Patient has a discharge followup appt scheduled on 10/29/2024 with Dr Padilla. Patient was discharged from .   Does the patient have an appointment with their PCP within 7-14 days of discharge? Yes   Psychosocial issues? No   Did the patient receive a copy of their discharge instructions? Yes   Nursing interventions Reviewed instructions with patient   What is the patient's perception of their health status since discharge? Improving   Is the patient/caregiver able to teach back signs and symptoms related to disease process for when to call PCP? Yes   Is the patient/caregiver able to teach back signs and symptoms related to disease process for when to call 911? Yes   Is the patient/caregiver able to teach back the hierarchy of who to call/visit for symptoms/problems? PCP, Specialist, Home health nurse, Urgent Care, ED, 911 Yes   TCM call completed? Yes   Call end time 1330   Would this patient benefit from a Referral to Amb Social Work? No   Is the patient interested in additional calls from an ambulatory ? No            Alexandru Barahona RN    10/28/2024, 13:31 EDT

## 2024-10-29 ENCOUNTER — TELEPHONE (OUTPATIENT)
Dept: FAMILY MEDICINE CLINIC | Facility: CLINIC | Age: 69
End: 2024-10-29

## 2024-10-29 ENCOUNTER — OFFICE VISIT (OUTPATIENT)
Dept: FAMILY MEDICINE CLINIC | Facility: CLINIC | Age: 69
End: 2024-10-29
Payer: MEDICARE

## 2024-10-29 VITALS
WEIGHT: 113 LBS | SYSTOLIC BLOOD PRESSURE: 112 MMHG | HEIGHT: 64 IN | DIASTOLIC BLOOD PRESSURE: 68 MMHG | OXYGEN SATURATION: 97 % | HEART RATE: 66 BPM | BODY MASS INDEX: 19.29 KG/M2

## 2024-10-29 DIAGNOSIS — I10 PRIMARY HYPERTENSION: ICD-10-CM

## 2024-10-29 DIAGNOSIS — Z22.39 VRE (VANCOMYCIN RESISTANT ENTEROCOCCUS) CULTURE POSITIVE: ICD-10-CM

## 2024-10-29 DIAGNOSIS — Z78.0 POSTMENOPAUSE: ICD-10-CM

## 2024-10-29 DIAGNOSIS — N39.0 URINARY TRACT INFECTION WITHOUT HEMATURIA, SITE UNSPECIFIED: Primary | ICD-10-CM

## 2024-10-29 DIAGNOSIS — N39.0 URINARY TRACT INFECTION WITHOUT HEMATURIA, SITE UNSPECIFIED: ICD-10-CM

## 2024-10-29 LAB
BILIRUB BLD-MCNC: NEGATIVE MG/DL
CLARITY, POC: CLEAR
COLOR UR: YELLOW
EXPIRATION DATE: ABNORMAL
EXPIRATION DATE: NORMAL
GLUCOSE UR STRIP-MCNC: NEGATIVE MG/DL
KETONES UR QL: NEGATIVE
LEUKOCYTE EST, POC: NEGATIVE
Lab: ABNORMAL
Lab: NORMAL
NITRITE UR-MCNC: NEGATIVE MG/ML
PH UR: 7 [PH] (ref 5–8)
POC CREATININE URINE: 50
POC MICROALBUMIN URINE: 150
PROT UR STRIP-MCNC: ABNORMAL MG/DL
RBC # UR STRIP: NEGATIVE /UL
SP GR UR: 1 (ref 1–1.03)
UROBILINOGEN UR QL: NORMAL

## 2024-10-29 PROCEDURE — 87186 SC STD MICRODIL/AGAR DIL: CPT | Performed by: FAMILY MEDICINE

## 2024-10-29 PROCEDURE — 87086 URINE CULTURE/COLONY COUNT: CPT | Performed by: FAMILY MEDICINE

## 2024-10-29 PROCEDURE — 87077 CULTURE AEROBIC IDENTIFY: CPT | Performed by: FAMILY MEDICINE

## 2024-10-29 RX ORDER — ALBUTEROL SULFATE 90 UG/1
2 INHALANT RESPIRATORY (INHALATION) EVERY 6 HOURS PRN
COMMUNITY
Start: 2024-07-25

## 2024-10-29 RX ORDER — CLOPIDOGREL BISULFATE 75 MG/1
75 TABLET ORAL DAILY
COMMUNITY
End: 2024-10-29 | Stop reason: ALTCHOICE

## 2024-10-29 RX ORDER — CEFUROXIME AXETIL 500 MG/1
TABLET ORAL
COMMUNITY
Start: 2024-10-18 | End: 2024-10-29

## 2024-10-29 RX ORDER — POLYETHYLENE GLYCOL 3350 17 G/17G
17 POWDER, FOR SOLUTION ORAL DAILY
COMMUNITY
Start: 2024-09-10

## 2024-10-29 RX ORDER — ACETAMINOPHEN 325 MG/1
650 TABLET ORAL
COMMUNITY
Start: 2024-07-25

## 2024-10-29 RX ORDER — DIPHENOXYLATE HYDROCHLORIDE AND ATROPINE SULFATE 2.5; .025 MG/1; MG/1
1 TABLET ORAL DAILY
COMMUNITY

## 2024-10-29 RX ORDER — GABAPENTIN 100 MG/1
100 CAPSULE ORAL
COMMUNITY

## 2024-10-29 RX ORDER — BACLOFEN 10 MG/1
10 TABLET ORAL 3 TIMES DAILY
COMMUNITY

## 2024-10-29 NOTE — TELEPHONE ENCOUNTER
"Caller: Johnna Contreras \"Sarah\"    Relationship: Self    Best call back number: [phone]     What are your concerns:  PATIENT WAS SEEN TODAY AND CHECKING THE STATUS OF HER MEDICATION REFILLS.  NOTHING HAS BEEN SENT TO THE PHARMACY.      [facility] [id number] [facility] [address] [place] [address] [phone] [facility] [phone] FX   "

## 2024-10-29 NOTE — PROGRESS NOTES
Hospital Follow-Up/Transition of Care Visit     Patient Name: Johnna Contreras  : 1955   MRN: [id number]   Care Team: Patient Care Team:  Ras Padilla DO as PCP - General (Family Medicine)  Case, Sobeida ENCINAS DO as Consulting Physician (Pulmonary Disease)  Zackery Garcia DO (Long Term Care Facility)    Chief Complaint:    Chief Complaint   Patient presents with    Hospital Follow Up Visit       History of Present Illness: Johnna Contreras is a 69 y.o. female who presents today for TCM visit.    Within 48 business hours after discharge our office contacted her via telephone to coordinate her care and needs.      I reviewed and discussed the details of that call along with the discharge summary, hospital problems, inpatient lab results, inpatient diagnostic studies, and consultation reports with Johnna.      Current outpatient and discharge medications have been reconciled for the patient.  Reviewed by: Ras Padilla DO          5/3/2024    10:22 PM 2024     2:52 PM 2024     4:37 AM 7/15/2024    12:54 PM 2024     6:44 PM 10/18/2024    11:42 PM 10/27/2024     4:29 PM   Date of TCM Phone Call   [facility] Altered mental status [facility]   Date of Admission 2024   2024 2024 10/16/2024 10/19/2024   Date of Discharge 5/3/2024 2024 2024 7/15/2024 2024 10/18/2024 10/27/2024   Discharge Disposition Home or Self Care Home-Health Care Sv  Home or Self Care  Home or Self Care Home or Self Care     Risk for Readmission (LACE) No data recorded    History of Present Illness   Course During Hospital Stay:    Chief Concern, Brief History of Present Illness, and Hospital Course  69 y.o. year old woman admitted after being found down at home. Initially required vasopressors and mechanical ventilation. Urine culture grew ESBL E coli. Responded well to  antibiotic therapy. Transferred out of ICU 10/23/2024. Initially planned to pursue subacute rehab but she and her  preferred for her to return directly home as she is at her functional baseline and has all the equipment she may need already in place at home.   1. Septic shock related to ascending ESBL UTI: Found down at home, found to have UTI with ESBL E coli. Initially required vasopressor support and mechanical ventilation. Home oxybutynin likely contributing to urinary retention and infection. Completed course of meropenem while admitted.  -discontinue oxybutynin  2. Cerebrovascular disease: History of stroke in 2015 with chronic left-sided deficits. Brain imaging on presentation with no acute process, stable parafalcine meningioma. TSH and free T4 normal. A1C 6.8. Mobility now at baseline which is wheelchair-bound with participation in transfers.  -continue statin for cerebrovascular disease  -continue baclofen for spasticity and gabapentin for neuropathic pain  3. History of bilateral subsegmental PEs: Shortly after her stroke, presumably provoked. Continue home apixaban  4. COPD: Continue LABA-ICS inhaler with bronchodilators as needed  5. Hypertension: Continue home lisinopril  6. Depression: Continue home citalopram   7. GERD: Continue acid suppression     1. Please discontinue oxybutynin as this was likely contributing to urinary retention and recurrent urinary tract infections  2. Please discontinue mirtazapine and melatonin as these are likely contributing to sedation and sleepiness  3. Please discontinue aspirin as it was not necessary while you are taking anticoagulation  4. Seek medical attention for any falls resulting in loss of consciousness, head trauma, or severe bleeding     Status Since Discharge:  NO changes     The following portions of the patient's history were reviewed and updated as appropriate: allergies, current medications, past family history, past medical history, past social  history, past surgical history, and problem list.    This patient is accompanied by their self who contributes to the history of their care.    The following portions of the patient's history were reviewed and updated as appropriate: allergies, current medications, past family history, past medical history, past social history, past surgical history and problem list.    Subjective      Review of Systems:   Review of Systems - See HPI    Past Medical History:   Past Medical History:   Diagnosis Date    Acute CVA (cerebrovascular accident)     Acute encephalopathy     Acute hypoxic respiratory failure     Acute metabolic encephalopathy     PAM (acute kidney injury)     Back problem     Bradycardia     Bronchitis     Claustrophobia     Constipation, unspecified     COPD exacerbation     COVID-19     Depression     Fall from wheelchair     GERD (gastroesophageal reflux disease)     Headache     Hemiplegia, unspecified affecting left dominant side     History of ectopic pregnancy     Hyperlipidemia     Hypertension     Ileus     Muscle weakness (generalized)     Personal history of other endocrine, nutritional and metabolic disease     Pneumonia due to E. coli     Seizures     last seizure at the age of 21    Stroke     Tobacco use     Traumatic hematoma of forehead        Past Surgical History:   Past Surgical History:   Procedure Laterality Date    DILATATION AND CURETTAGE      EYE SURGERY      TONSILLECTOMY      TUBAL ABDOMINAL LIGATION         Family History:   Family History   Problem Relation Age of Onset    Heart attack Mother     Stroke Mother     Hypertension Mother     Hyperlipidemia Mother     Diabetes Father     Breast cancer Paternal Grandmother     Cancer Paternal Grandmother     Stroke Paternal Grandmother     Heart attack Paternal Grandmother     Ovarian cancer Neg Hx        Social History:   Social History     Socioeconomic History    Marital status:    Tobacco Use    Smoking status: Every Day      Current packs/day: 1.00     Average packs/day: 1 pack/day for 50.0 years (50.0 ttl pk-yrs)     Types: Cigarettes     Passive exposure: Never    Smokeless tobacco: Never   Vaping Use    Vaping status: Never Used   Substance and Sexual Activity    Alcohol use: Yes     Comment: rarely     Drug use: No    Sexual activity: Defer       Tobacco History:   Social History     Tobacco Use   Smoking Status Every Day    Current packs/day: 1.00    Average packs/day: 1 pack/day for 50.0 years (50.0 ttl pk-yrs)    Types: Cigarettes    Passive exposure: Never   Smokeless Tobacco Never       Medications:     Current Outpatient Medications:     acetaminophen (TYLENOL) 325 MG tablet, Take 2 tablets by mouth., Disp: , Rfl:     albuterol sulfate  (90 Base) MCG/ACT inhaler, Inhale 2 puffs Every 6 (Six) Hours As Needed., Disp: , Rfl:     apixaban (ELIQUIS) 5 MG tablet tablet, Take 1 tablet by mouth., Disp: , Rfl:     atorvastatin (LIPITOR) 20 MG tablet, Take 1 tablet by mouth Every Night., Disp: , Rfl:     bisacodyl (DULCOLAX) 5 MG EC tablet, Take 2 tablets by mouth Daily., Disp: , Rfl:     Cholecalciferol 10 MCG (400 UNIT) tablet, Take 1 tablet by mouth Daily., Disp: , Rfl:     citalopram (CeleXA) 20 MG tablet, Take 2 tablets by mouth Daily., Disp: , Rfl:     EPINEPHrine (EPIPEN) 0.3 MG/0.3ML solution auto-injector injection, , Disp: , Rfl:     Fluticasone-Umeclidin-Vilant (TRELEGY) 100-62.5-25 MCG/ACT inhaler, Inhale 1 puff Daily., Disp: , Rfl:     HYDROcodone-acetaminophen (NORCO) 5-325 MG per tablet, Take 1 tablet by mouth Every 6 (Six) Hours As Needed for Moderate Pain., Disp: , Rfl:     ibuprofen (ADVIL,MOTRIN) 400 MG tablet, Take 1 tablet by mouth Every 4 (Four) Hours As Needed for Mild Pain., Disp: , Rfl:     lisinopril (PRINIVIL,ZESTRIL) 10 MG tablet, Take 2 tablets by mouth Daily., Disp: , Rfl:     multivitamin with minerals tablet tablet, Take 1 tablet by mouth Daily., Disp: , Rfl:     naloxone (NARCAN) 4 MG/0.1ML nasal  "spray, Administer 1 spray into the nostril(s) as directed by provider., Disp: , Rfl:     NIFEdipine XL (PROCARDIA XL) 30 MG 24 hr tablet, Take 1 tablet by mouth Daily., Disp: , Rfl:     nitroglycerin (NITROSTAT) 0.4 MG SL tablet, Place 1 tablet under the tongue Every 5 (Five) Minutes As Needed for Chest Pain. Take no more than 3 doses in 15 minutes., Disp: , Rfl:     pantoprazole (PROTONIX) 40 MG EC tablet, Take 1 tablet by mouth Daily., Disp: , Rfl:     polyethylene glycol (MIRALAX) 17 g packet, Take 17 g by mouth Daily., Disp: , Rfl:     povidone-iodine (BETADINE) 10 % external solution 10%, Apply 1 Application topically to the appropriate area as directed As Needed for Wound Care. APPLY TO BALL OF LEFT FOOT DTI, COVER WITH ISLAND [facility] BORDER DRESSING ., Disp: , Rfl:     senna 8.6 MG tablet, Take 1 tablet by mouth Daily., Disp: , Rfl:     tiotropium bromide monohydrate (Spiriva Respimat) 2.5 MCG/ACT aerosol solution inhaler, Inhale 2 puffs Daily., Disp: , Rfl:     baclofen (LIORESAL) 10 MG tablet, Take 1 tablet by mouth 3 (Three) Times a Day., Disp: , Rfl:     gabapentin (NEURONTIN) 100 MG capsule, Take 1 capsule by mouth., Disp: , Rfl:     multivitamin (THERAGRAN) tablet tablet, Take 1 tablet by mouth Daily., Disp: , Rfl:     Allergies:   Allergies   Allergen Reactions    Peanut Allergen Powder-Dnfp Rash    Tree Nut Rash       Objective   Objective     Physical Exam:  Vital Signs:   Vitals:    10/29/24 1029   BP: 112/68   Pulse: 66   SpO2: 97%   Weight: 51.3 kg (113 lb)   Height: 162.6 cm (64.02\")   PainSc:   2     Body mass index is 19.39 kg/m².     Physical Exam  Nursing note reviewed  Const: NAD, A&Ox4, Pleasant, Cooperative  Eyes: EOMI, no conjunctivitis  ENT: No nasal discharge present, neck supple  Cardiac: Regular rate and rhythm, no cyanosis  Resp: Respiratory rate within normal limits, no increased work of breathing, no audible wheezing or retractions noted  GI: No distention or ascites  MSK: Motor and " sensation grossly intact in bilateral upper extremities  Neurologic: CN II-XII grossly intact  Psych: Appropriate mood and behavior.  Skin: Warm, dry  Procedures/Radiology     Procedures  No radiology results for the last 7 days     Assessment & Plan   Assessment / Plan      Assessment/Plan:   Problems Addressed This Visit  Diagnoses and all orders for this visit:    1. Urinary tract infection without hematuria, site unspecified (Primary)  -     POC Urinalysis Dipstick, Automated  -     Urine Culture - Urine, Urine, Clean Catch; Future    2. Postmenopause    3. Primary hypertension  -     POCT microalbumin      Problem List Items Addressed This Visit          Cardiac and Vasculature    Hypertension    Overview     Amlodipine 5mg daily, lisinopril 5mg (changed from lisinopril-HCTZ 20-12.5 inpatient 2/2023 for hyponatremia)         Relevant Orders    POCT microalbumin (Completed)     Other Visit Diagnoses       Urinary tract infection without hematuria, site unspecified    -  Primary    Relevant Orders    POC Urinalysis Dipstick, Automated (Completed)    Urine Culture - Urine, Urine, Clean Catch (Completed)    Postmenopause                See patient diagnoses and orders along with patient instructions for assessment, plan, and changes to care for patient.    There are no Patient Instructions on file for this visit.    Follow Up:   Return if symptoms worsen or fail to improve.        MDM       [facility] [address]  [facility]  [address]  [facility] [address]  Fax [phone]  Phone [phone]

## 2024-10-30 NOTE — TELEPHONE ENCOUNTER
Unable to reach Johnna Contreras by phone or leave message.    Hub may relay message and document.     [address] Clinical Pool4 minutes ago (11:33 AM)     BR  Please call to see what medications she needs refilled. Per the note, some of them were to be discontinued.

## 2024-10-31 ENCOUNTER — TELEPHONE (OUTPATIENT)
Dept: FAMILY MEDICINE CLINIC | Facility: CLINIC | Age: 69
End: 2024-10-31
Payer: MEDICARE

## 2024-10-31 LAB — BACTERIA SPEC AEROBE CULT: ABNORMAL

## 2024-11-15 ENCOUNTER — TELEPHONE (OUTPATIENT)
Dept: FAMILY MEDICINE CLINIC | Facility: CLINIC | Age: 69
End: 2024-11-15
Payer: MEDICARE

## 2024-11-17 RX ORDER — LINEZOLID 600 MG/1
600 TABLET, FILM COATED ORAL 2 TIMES DAILY
Qty: 14 TABLET | Refills: 0 | Status: SHIPPED | OUTPATIENT
Start: 2024-11-17 | End: 2024-11-24

## 2024-11-18 ENCOUNTER — TELEPHONE (OUTPATIENT)
Dept: FAMILY MEDICINE CLINIC | Facility: CLINIC | Age: 69
End: 2024-11-18
Payer: MEDICARE

## 2024-11-18 NOTE — TELEPHONE ENCOUNTER
----- Message from Ras Padilla sent at 11/17/2024  9:25 PM EST -----    Please call to check on her UTI symptoms. If she is still having sme symptoms, she should start the new antibiotic that was sent to her pharmacy.    Unable to reach patient.    Please relay.

## 2024-11-21 NOTE — TELEPHONE ENCOUNTER
Caller: MAT    Relationship: Home Health    Best call back number: [phone]    Requested Prescriptions:   Requested Prescriptions     Pending Prescriptions Disp Refills    apixaban (ELIQUIS) 5 MG tablet tablet 60 tablet      Sig: Take 1 tablet by mouth.        Pharmacy where request should be sent:    [facility] [phone]  Last office visit with prescribing clinician: 10/29/2024   Last telemedicine visit with prescribing clinician: Visit date not found   Next office visit with prescribing clinician: 1/29/2025     Additional details provided by patient: PATIENT IS OUT OF MEDICATION    Does the patient have less than a 3 day supply:  [x] Yes  [] No    Would you like a call back once the refill request has been completed: [] Yes [x] No    If the office needs to give you a call back, can they leave a voicemail: [] Yes [x] No    Cameron Leyva Rep   11/21/24 09:16 EST

## 2024-11-21 NOTE — TELEPHONE ENCOUNTER
Caller: MAT    Relationship:     CALLER FROM [facility]    Best call back number:     [phone]     What orders are you requesting (i.e. lab or imaging):     VERBAL ORDER FROM DR PORTILLO FOR URINALYSIS WITH CNS    CALLER STATED PATIENT IS EXPERIENCING BURNING WITH URINATION    In what timeframe would the patient need to come in:     AS SOON AS POSSIBLE

## 2024-11-26 ENCOUNTER — TELEPHONE (OUTPATIENT)
Dept: FAMILY MEDICINE CLINIC | Facility: CLINIC | Age: 69
End: 2024-11-26
Payer: MEDICARE

## 2024-11-26 DIAGNOSIS — M72.2 CONTRACTURE OF PLANTAR FASCIA: Primary | ICD-10-CM

## 2024-11-26 RX ORDER — LINEZOLID 600 MG/1
600 TABLET, FILM COATED ORAL 2 TIMES DAILY
Qty: 14 TABLET | Refills: 0 | Status: SHIPPED | OUTPATIENT
Start: 2024-11-26 | End: 2024-12-03

## 2024-11-26 NOTE — TELEPHONE ENCOUNTER
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 Fax number to send it to: [phone].

## 2024-11-26 NOTE — TELEPHONE ENCOUNTER
I spoke with Estelle to discuss the last abx that was called in last week. I informed her I tried to reach out to the patient a few times and could not get an answer. A new abx was sent to her pharmacy based off her last urine results per Dr Padilla. She is going to inform the patient to pick that up.

## 2024-11-26 NOTE — TELEPHONE ENCOUNTER
Estelle called back stating that the medication was not at [facility] and if it can be resent. I have sent this again.

## 2024-11-26 NOTE — TELEPHONE ENCOUNTER
Elkin Mccabe from [facility], she stated that med prescription was supposed to be sent t the pharmacy on yesterday 11/26. She called to speak with [facility] but no answer. Estelle would like a call back at [phone].

## 2024-11-26 NOTE — TELEPHONE ENCOUNTER
Caller: MAT    Relationship: [facility]    Best call back number: [phone]    What medication are you requesting: AN ANTIBITIOC    What are your current symptoms: BURNING AND BACTERIA IN UA    How long have you been experiencing symptoms:  A WEEK.    Have you had these symptoms before:    [x] Yes  [] No    Have you been treated for these symptoms before:   [x] Yes  [] No    If a prescription is needed, what is your preferred pharmacy and phone number: [facility] [id number] [address] [phone] [facility] [phone]      Additional notes: PLEASE CALL MAT TO ADVISE ONCE SENT. THANK YOU.

## 2024-12-13 ENCOUNTER — OFFICE VISIT (OUTPATIENT)
Dept: FAMILY MEDICINE CLINIC | Facility: CLINIC | Age: 69
End: 2024-12-13
Payer: MEDICARE

## 2024-12-13 ENCOUNTER — TELEPHONE (OUTPATIENT)
Dept: FAMILY MEDICINE CLINIC | Facility: CLINIC | Age: 69
End: 2024-12-13

## 2024-12-13 ENCOUNTER — LAB (OUTPATIENT)
Dept: LAB | Facility: HOSPITAL | Age: 69
End: 2024-12-13
Payer: MEDICARE

## 2024-12-13 VITALS
BODY MASS INDEX: 19.29 KG/M2 | DIASTOLIC BLOOD PRESSURE: 78 MMHG | HEIGHT: 64 IN | HEART RATE: 103 BPM | SYSTOLIC BLOOD PRESSURE: 117 MMHG | WEIGHT: 113 LBS | OXYGEN SATURATION: 93 %

## 2024-12-13 DIAGNOSIS — M54.42 ACUTE LEFT-SIDED LOW BACK PAIN WITH LEFT-SIDED SCIATICA: ICD-10-CM

## 2024-12-13 DIAGNOSIS — M54.12 CERVICAL RADICULOPATHY: Primary | ICD-10-CM

## 2024-12-13 DIAGNOSIS — M50.30 DDD (DEGENERATIVE DISC DISEASE), CERVICAL: ICD-10-CM

## 2024-12-13 DIAGNOSIS — E55.9 VITAMIN D DEFICIENCY: ICD-10-CM

## 2024-12-13 DIAGNOSIS — Z22.358 ESBL E. COLI CARRIER: ICD-10-CM

## 2024-12-13 LAB — MAGNESIUM SERPL-MCNC: 2 MG/DL (ref 1.6–2.4)

## 2024-12-13 PROCEDURE — 80053 COMPREHEN METABOLIC PANEL: CPT

## 2024-12-13 PROCEDURE — 3074F SYST BP LT 130 MM HG: CPT | Performed by: FAMILY MEDICINE

## 2024-12-13 PROCEDURE — 1125F AMNT PAIN NOTED PAIN PRSNT: CPT | Performed by: FAMILY MEDICINE

## 2024-12-13 PROCEDURE — 3078F DIAST BP <80 MM HG: CPT | Performed by: FAMILY MEDICINE

## 2024-12-13 PROCEDURE — 82306 VITAMIN D 25 HYDROXY: CPT

## 2024-12-13 PROCEDURE — 99214 OFFICE O/P EST MOD 30 MIN: CPT | Performed by: FAMILY MEDICINE

## 2024-12-13 PROCEDURE — 3044F HG A1C LEVEL LT 7.0%: CPT | Performed by: FAMILY MEDICINE

## 2024-12-13 PROCEDURE — 83735 ASSAY OF MAGNESIUM: CPT

## 2024-12-13 RX ORDER — CITALOPRAM HYDROBROMIDE 20 MG/1
40 TABLET ORAL DAILY
Qty: 90 TABLET | Refills: 1 | Status: SHIPPED | OUTPATIENT
Start: 2024-12-13

## 2024-12-13 RX ORDER — VIT C/B6/B5/MAGNESIUM/HERB 173 50-5-6-5MG
500 CAPSULE ORAL 2 TIMES DAILY
Qty: 60 CAPSULE | Refills: 11 | Status: SHIPPED | OUTPATIENT
Start: 2024-12-13

## 2024-12-13 RX ORDER — LISINOPRIL 10 MG/1
20 TABLET ORAL DAILY
Qty: 90 TABLET | Refills: 1 | Status: SHIPPED | OUTPATIENT
Start: 2024-12-13

## 2024-12-13 RX ORDER — BACLOFEN 10 MG/1
10 TABLET ORAL 3 TIMES DAILY
Qty: 90 TABLET | Refills: 2 | Status: SHIPPED | OUTPATIENT
Start: 2024-12-13

## 2024-12-13 RX ORDER — PANTOPRAZOLE SODIUM 40 MG/1
40 TABLET, DELAYED RELEASE ORAL DAILY
Qty: 90 TABLET | Refills: 1 | Status: SHIPPED | OUTPATIENT
Start: 2024-12-13

## 2024-12-13 RX ORDER — SULFAMETHOXAZOLE AND TRIMETHOPRIM 800; 160 MG/1; MG/1
1 TABLET ORAL 2 TIMES DAILY
Qty: 14 TABLET | Refills: 0 | Status: SHIPPED | OUTPATIENT
Start: 2024-12-13 | End: 2024-12-20

## 2024-12-13 NOTE — TELEPHONE ENCOUNTER
"  Caller: Johnna Contreras \"Rosa Elena\"    Relationship: Self    Best call back number: [phone]     What is the best time to reach you: TODAY    Who are you requesting to speak with (clinical staff, provider,  specific staff member): PCP/MA    Do you know the name of the person who called: ROSA ELENA    What was the call regarding: PATIENT WAS JUST IN OFFICE AND FORGOT TO MENTION TO PCP THAT THE THERAPIST WITH [facility] WANTS HER TO HAVE A BRACE MADE FOR HER LEFT FOOT THAT WOULD STRAIGHTEN IT OUT FLAT. PATIENT STATED SHE WOULD GET THE THERAPIST TO CALL IN TO THE OFFICE TO ADVISE WHERE TO SEND THE ORDER    Is it okay if the provider responds through MyChart: CALLBACK      "

## 2024-12-13 NOTE — TELEPHONE ENCOUNTER
"Latrell Carrillo Reg[facility] RepJust now (1:54 PM)       Caller: Johnna Contreras \"Sarah\"     Relationship: Self     Best call back number:      [phone] (Mobile)         What was the call regarding: PATIENT WANTS DR PORTILLO TO DISREGARD THE FIRST MESSAGE FOR THE BRACE FOR HER LEG         "

## 2024-12-14 LAB
25(OH)D3 SERPL-MCNC: 38.6 NG/ML (ref 30–100)
ALBUMIN SERPL-MCNC: 3.3 G/DL (ref 3.5–5.2)
ALBUMIN/GLOB SERPL: 0.9 G/DL
ALP SERPL-CCNC: 118 U/L (ref 39–117)
ALT SERPL W P-5'-P-CCNC: 18 U/L (ref 1–33)
ANION GAP SERPL CALCULATED.3IONS-SCNC: 10.2 MMOL/L (ref 5–15)
AST SERPL-CCNC: 27 U/L (ref 1–32)
BILIRUB SERPL-MCNC: <0.2 MG/DL (ref 0–1.2)
BUN SERPL-MCNC: 12 MG/DL (ref 8–23)
BUN/CREAT SERPL: 14.6 (ref 7–25)
CALCIUM SPEC-SCNC: 9.2 MG/DL (ref 8.6–10.5)
CHLORIDE SERPL-SCNC: 98 MMOL/L (ref 98–107)
CO2 SERPL-SCNC: 22.8 MMOL/L (ref 22–29)
CREAT SERPL-MCNC: 0.82 MG/DL (ref 0.57–1)
EGFRCR SERPLBLD CKD-EPI 2021: 78 ML/MIN/1.73
GLOBULIN UR ELPH-MCNC: 3.8 GM/DL
GLUCOSE SERPL-MCNC: 131 MG/DL (ref 65–99)
POTASSIUM SERPL-SCNC: 4.5 MMOL/L (ref 3.5–5.2)
PROT SERPL-MCNC: 7.1 G/DL (ref 6–8.5)
SODIUM SERPL-SCNC: 131 MMOL/L (ref 136–145)

## 2024-12-26 ENCOUNTER — OUTSIDE FACILITY SERVICE (OUTPATIENT)
Dept: FAMILY MEDICINE CLINIC | Facility: CLINIC | Age: 69
End: 2024-12-26
Payer: MEDICARE

## 2024-12-26 RX ORDER — GABAPENTIN 300 MG/1
300 CAPSULE ORAL
Qty: 30 CAPSULE | Refills: 5 | Status: SHIPPED | OUTPATIENT
Start: 2024-12-26

## 2024-12-26 NOTE — PROGRESS NOTES
Established Patient Office Visit      Patient Name: Johnna Contreras  : 1955   MRN: [id number]   Care Team: Patient Care Team:  Ras Padilla DO as PCP - General (Family Medicine)  Case, Sobeida ENCINAS DO as Consulting Physician (Pulmonary Disease)  Zackery Garcia DO ([facility] Care Facility)    Chief Complaint:    Chief Complaint   Patient presents with    Back Pain     Pt states her back pain has gotten worse the last couple weeks    Neck Pain     Pt states she is having neck pain on her right side into her shoulder , has gotten worse the last couple of weeks        History of Present Illness: Johnna Contreras is a 69 y.o. female who is here today for chief complaint.    HPI    Reports her back pain has gotten worse over the last few weeks, continues to have neck pain down into her right shoulder    Previous urine culture positive for E. coli, no symptoms so may be carrier    This patient is accompanied by their self who contributes to the history of their care.    The following portions of the patient's history were reviewed and updated as appropriate: allergies, current medications, past family history, past medical history, past social history, past surgical history and problem list.    Subjective      Review of Systems:   Review of Systems - See HPI    Past Medical History:   Past Medical History:   Diagnosis Date    Acute CVA (cerebrovascular accident)     Acute encephalopathy     Acute hypoxic respiratory failure     Acute metabolic encephalopathy     PAM (acute kidney injury)     Back problem     Bradycardia     Bronchitis     Claustrophobia     Constipation, unspecified     COPD exacerbation     COVID-19     Depression     Fall from wheelchair     GERD (gastroesophageal reflux disease)     Headache     Hemiplegia, unspecified affecting left dominant side     History of ectopic pregnancy     Hyperlipidemia     Hypertension     Ileus     Muscle weakness (generalized)     Personal history of other  endocrine, nutritional and metabolic disease     Pneumonia due to E. coli     Seizures     last seizure at the age of 21    Stroke     Tobacco use     Traumatic hematoma of forehead        Past Surgical History:   Past Surgical History:   Procedure Laterality Date    DILATATION AND CURETTAGE      EYE SURGERY      TONSILLECTOMY      TUBAL ABDOMINAL LIGATION         Family History:   Family History   Problem Relation Age of Onset    Heart attack Mother     Stroke Mother     Hypertension Mother     Hyperlipidemia Mother     Diabetes Father     Breast cancer Paternal Grandmother     Cancer Paternal Grandmother     Stroke Paternal Grandmother     Heart attack Paternal Grandmother     Ovarian cancer Neg Hx        Social History:   Social History     Socioeconomic History    Marital status:    Tobacco Use    Smoking status: Every Day     Current packs/day: 1.00     Average packs/day: 1 pack/day for 50.0 years (50.0 ttl pk-yrs)     Types: Cigarettes     Passive exposure: Never    Smokeless tobacco: Never   Vaping Use    Vaping status: Never Used   Substance and Sexual Activity    Alcohol use: Yes     Comment: rarely     Drug use: No    Sexual activity: Defer       Tobacco History:   Social History     Tobacco Use   Smoking Status Every Day    Current packs/day: 1.00    Average packs/day: 1 pack/day for 50.0 years (50.0 ttl pk-yrs)    Types: Cigarettes    Passive exposure: Never   Smokeless Tobacco Never       Medications:   Outpatient Medications Prior to Visit   Medication Sig Dispense Refill    acetaminophen (TYLENOL) 325 MG tablet Take 2 tablets by mouth.      albuterol sulfate  (90 Base) MCG/ACT inhaler Inhale 2 puffs Every 6 (Six) Hours As Needed.      apixaban (ELIQUIS) 5 MG tablet tablet Take 1 tablet by mouth Every 12 (Twelve) Hours. 60 tablet 2    atorvastatin (LIPITOR) 20 MG tablet Take 1 tablet by mouth Every Night.      bisacodyl (DULCOLAX) 5 MG EC tablet Take 2 tablets by mouth Daily.       Cholecalciferol 10 MCG (400 UNIT) tablet Take 1 tablet by mouth Daily.      EPINEPHrine (EPIPEN) 0.3 MG/0.3ML solution auto-injector injection       ibuprofen (ADVIL,MOTRIN) 400 MG tablet Take 1 tablet by mouth Every 4 (Four) Hours As Needed for Mild Pain.      multivitamin (THERAGRAN) tablet tablet Take 1 tablet by mouth Daily.      multivitamin with minerals tablet tablet Take 1 tablet by mouth Daily.      NIFEdipine XL (PROCARDIA XL) 30 MG 24 hr tablet Take 1 tablet by mouth Daily.      nitroglycerin (NITROSTAT) 0.4 MG SL tablet Place 1 tablet under the tongue Every 5 (Five) Minutes As Needed for Chest Pain. Take no more than 3 doses in 15 minutes.      polyethylene glycol (MIRALAX) 17 g packet Take 17 g by mouth Daily.      povidone-iodine (BETADINE) 10 % external solution 10% Apply 1 Application topically to the appropriate area as directed As Needed for Wound Care. APPLY TO BALL OF LEFT FOOT DTI, COVER WITH ISLAND GUAZE BORDER DRESSING .      senna 8.6 MG tablet Take 1 tablet by mouth Daily.      tiotropium bromide monohydrate (Spiriva Respimat) 2.5 MCG/ACT aerosol solution inhaler Inhale 2 puffs Daily.      baclofen (LIORESAL) 10 MG tablet Take 1 tablet by mouth 3 (Three) Times a Day.      citalopram (CeleXA) 20 MG tablet Take 2 tablets by mouth Daily.      Fluticasone-Umeclidin-Vilant (TRELEGY) 100-62.5-25 MCG/ACT inhaler Inhale 1 puff Daily.      lisinopril (PRINIVIL,ZESTRIL) 10 MG tablet Take 2 tablets by mouth Daily.      pantoprazole (PROTONIX) 40 MG EC tablet Take 1 tablet by mouth Daily.      gabapentin (NEURONTIN) 100 MG capsule Take 1 capsule by mouth.      HYDROcodone-acetaminophen (NORCO) 5-325 MG per tablet Take 1 tablet by mouth Every 6 (Six) Hours As Needed for Moderate Pain. (Patient not taking: Reported on 12/13/2024)      naloxone (NARCAN) 4 MG/0.1ML nasal spray Administer 1 spray into the nostril(s) as directed by provider. (Patient not taking: Reported on 12/13/2024)       No  "facility-administered medications prior to visit.        Allergies:   Allergies   Allergen Reactions    Peanut Allergen Powder-Dnfp Rash    Tree Nut Rash       Objective   Objective     Physical Exam:  Vital Signs:   Vitals:    12/13/24 0900   BP: 117/78   Pulse: 103   SpO2: 93%   Weight: 51.3 kg (113 lb)   Height: 162.6 cm (64.02\")     Body mass index is 19.39 kg/m².     Physical Exam  Nursing note reviewed  Const: NAD, A&Ox4, Pleasant, Cooperative  Eyes: EOMI, no conjunctivitis  ENT: No nasal discharge present, neck supple  Cardiac: Regular rate and rhythm, no cyanosis  Resp: Respiratory rate within normal limits, no increased work of breathing, no audible wheezing or retractions noted  GI: No distention or ascites  MSK: Encountered in power wheelchair, severe cervicothoracic kyphosis  Procedures/Radiology     Procedures  No radiology results for the last 7 days     Assessment & Plan   Assessment / Plan      Assessment/Plan:   Problems Addressed This Visit  Diagnoses and all orders for this visit:    1. Cervical radiculopathy (Primary)  -     Ambulatory Referral to Orthopedic Surgery  -     Turmeric 500 MG capsule; Take 1 capsule by mouth 2 (Two) Times a Day. For inflammation/pain  Dispense: 60 capsule; Refill: 11  -     gabapentin (NEURONTIN) 300 MG capsule; Take 1 capsule by mouth every night at bedtime.  Dispense: 30 capsule; Refill: 5    2. Acute left-sided low back pain with left-sided sciatica    3. DDD (degenerative disc disease), cervical  -     Ambulatory Referral to Orthopedic Surgery  -     Turmeric 500 MG capsule; Take 1 capsule by mouth 2 (Two) Times a Day. For inflammation/pain  Dispense: 60 capsule; Refill: 11  -     gabapentin (NEURONTIN) 300 MG capsule; Take 1 capsule by mouth every night at bedtime.  Dispense: 30 capsule; Refill: 5    4. ESBL E. coli carrier  -     sulfamethoxazole-trimethoprim (Bactrim DS) 800-160 MG per tablet; Take 1 tablet by mouth 2 (Two) Times a Day for 7 days.  Dispense: " 14 tablet; Refill: 0    5. Vitamin D deficiency  -     Vitamin D,25-Hydroxy; Future  -     Magnesium; Future  -     Comprehensive Metabolic Panel; Future    Other orders  -     baclofen (LIORESAL) 10 MG tablet; Take 1 tablet by mouth 3 (Three) Times a Day.  Dispense: 90 tablet; Refill: 2  -     Fluticasone-Umeclidin-Vilant (TRELEGY) 100-62.5-25 MCG/ACT inhaler; Inhale 1 puff Daily.  Dispense: 60 each; Refill: 11  -     citalopram (CeleXA) 20 MG tablet; Take 2 tablets by mouth Daily.  Dispense: 90 tablet; Refill: 1  -     lisinopril (PRINIVIL,ZESTRIL) 10 MG tablet; Take 2 tablets by mouth Daily.  Dispense: 90 tablet; Refill: 1  -     pantoprazole (PROTONIX) 40 MG EC tablet; Take 1 tablet by mouth Daily.  Dispense: 90 tablet; Refill: 1      Problem List Items Addressed This Visit          Neuro    DDD (degenerative disc disease), cervical    Overview     Advanced multilevel degenerative endplate changes, facet arthropathy and uncovertebral hypertrophy on CT 8/2024. Osteoporotic. Similar complete attenuation of the disc spaces from C4/5 to C6/C7, near complete attenuation at C7/T1 with vacuum phenomena and adjacent endplate sclerosis. Multilevel small endplate osteophytes, and uncovertebral joint are noted. Similar appearance of multilevel facet arthropathy, with ankylosis of the facets on the right from C3-4 through C6. Intact dens.          Relevant Medications    Turmeric 500 MG capsule    gabapentin (NEURONTIN) 300 MG capsule    Other Relevant Orders    Ambulatory Referral to Orthopedic Surgery (Completed)     Other Visit Diagnoses       Cervical radiculopathy    -  Primary    Relevant Medications    Turmeric 500 MG capsule    gabapentin (NEURONTIN) 300 MG capsule    Other Relevant Orders    Ambulatory Referral to Orthopedic Surgery (Completed)    Acute left-sided low back pain with left-sided sciatica        ESBL E. coli carrier        Vitamin D deficiency        Relevant Orders    Vitamin D,25-Hydroxy (Completed)     Magnesium (Completed)    Comprehensive Metabolic Panel (Completed)          Orders Placed This Encounter   Procedures    Vitamin D,25-Hydroxy     Standing Status:   Future     Number of Occurrences:   1     Standing Expiration Date:   12/13/2025     Order Specific Question:   Release to patient     Answer:   Routine Release [[id number]]    Magnesium     Standing Status:   Future     Number of Occurrences:   1     Standing Expiration Date:   12/13/2025     Order Specific Question:   Release to patient     Answer:   Routine Release [[id number]]    Comprehensive Metabolic Panel     Standing Status:   Future     Number of Occurrences:   1     Standing Expiration Date:   12/13/2025     Order Specific Question:   Release to patient     Answer:   Routine Release [[id number]]    Ambulatory Referral to Orthopedic Surgery     Referral Priority:   Routine     Referral Type:   Consultation     Referral Reason:   Specialty Services Required     Referral Location:   [facility]     Requested Specialty:   Orthopedic Surgery     Number of Visits Requested:   1       There are no Patient Instructions on file for this visit.    Follow Up:   No follow-ups on file.      DO GURJTI Jackson RD  [facility]  [address] [facility] [facility] [address]  Fax [phone]  Phone [phone]    Disclaimer to patients: The 21st Century Cares Act makes medical notes like these available to patients in the interest of transparency. However, please be advised that this is still a medical document. It is intended as gjci-tx-rkut communication. Many sections may include medical language or jargon, abbreviations, and additional verbiage that are unfamiliar or confusing. In some ways it may come across as blunt, direct, or may be summarized in order to clearly and concisely communicate the most crucial information to medical professionals. It may also include mentions of  conditions that are unlikely but considered as part of the differential diagnosis, including serious disorders. These are not always discussed at length at the time of appointment because their likelihood is so low, but may be included in a medical note to make it clear what has been considered and/or ruled out as part of a work-up. Medical documents are intended to carry relevant information, facts as evident, and the personal clinical opinion of the physician. If you have any questions regarding this medical document, please bring them to the attention of the physician at your next scheduled appointment.

## 2025-01-08 ENCOUNTER — TELEPHONE (OUTPATIENT)
Dept: FAMILY MEDICINE CLINIC | Facility: CLINIC | Age: 70
End: 2025-01-08
Payer: MEDICARE

## 2025-01-08 NOTE — TELEPHONE ENCOUNTER
Caller: MAT [facility] [employer]    Relationship: Other    Best call back number: [phone]     What medication are you requesting: MEDICATION FOR AN UTI INFECTION    What are your current symptoms: CLOUDY URINE, POSITIVE UA TEST STRIP, INCREASED WEAKNESS    How long have you been experiencing symptoms: STARTED TODAY WITH THE WEAKNESS        If a prescription is needed, what is your preferred pharmacy and phone number:    [facility] [id number] [address] [phone] [facility] [phone] F   Additional notes:PLEASE CALL WITH ANY QUESTIONS OR WHEN COMPLETED.

## 2025-01-09 RX ORDER — SULFAMETHOXAZOLE AND TRIMETHOPRIM 800; 160 MG/1; MG/1
1 TABLET ORAL 2 TIMES DAILY
Qty: 14 TABLET | Refills: 0 | Status: SHIPPED | OUTPATIENT
Start: 2025-01-09 | End: 2025-01-16

## 2025-01-10 PROCEDURE — G0179 MD RECERTIFICATION HHA PT: HCPCS | Performed by: FAMILY MEDICINE

## 2025-01-13 ENCOUNTER — TELEPHONE (OUTPATIENT)
Dept: FAMILY MEDICINE CLINIC | Facility: CLINIC | Age: 70
End: 2025-01-13
Payer: MEDICARE

## 2025-01-13 NOTE — TELEPHONE ENCOUNTER
Caller: MARLENA    Relationship: Home Health    Best call back number: [phone] [facility]    What was the call regarding: FAXING IN LAB RESULTS, PATIENT MAY HAVE A UTI. PLEASE ADVISE.

## 2025-01-30 ENCOUNTER — OFFICE VISIT (OUTPATIENT)
Dept: FAMILY MEDICINE CLINIC | Facility: CLINIC | Age: 70
End: 2025-01-30
Payer: MEDICARE

## 2025-01-30 VITALS
OXYGEN SATURATION: 94 % | BODY MASS INDEX: 19.29 KG/M2 | HEART RATE: 93 BPM | DIASTOLIC BLOOD PRESSURE: 72 MMHG | WEIGHT: 113 LBS | SYSTOLIC BLOOD PRESSURE: 118 MMHG | HEIGHT: 64 IN

## 2025-01-30 DIAGNOSIS — J02.9 SORE THROAT: Primary | ICD-10-CM

## 2025-01-30 LAB
EXPIRATION DATE: NORMAL
EXPIRATION DATE: NORMAL
FLUAV AG UPPER RESP QL IA.RAPID: NOT DETECTED
FLUBV AG UPPER RESP QL IA.RAPID: NOT DETECTED
INTERNAL CONTROL: NORMAL
INTERNAL CONTROL: NORMAL
Lab: NORMAL
Lab: NORMAL
S PYO AG THROAT QL: NEGATIVE
SARS-COV-2 AG UPPER RESP QL IA.RAPID: NOT DETECTED

## 2025-01-30 PROCEDURE — 87428 SARSCOV & INF VIR A&B AG IA: CPT | Performed by: FAMILY MEDICINE

## 2025-01-30 PROCEDURE — 1125F AMNT PAIN NOTED PAIN PRSNT: CPT | Performed by: FAMILY MEDICINE

## 2025-01-30 PROCEDURE — 87880 STREP A ASSAY W/OPTIC: CPT | Performed by: FAMILY MEDICINE

## 2025-01-30 PROCEDURE — 3074F SYST BP LT 130 MM HG: CPT | Performed by: FAMILY MEDICINE

## 2025-01-30 PROCEDURE — 99213 OFFICE O/P EST LOW 20 MIN: CPT | Performed by: FAMILY MEDICINE

## 2025-01-30 PROCEDURE — 3078F DIAST BP <80 MM HG: CPT | Performed by: FAMILY MEDICINE

## 2025-01-30 RX ORDER — BACLOFEN 10 MG/1
10 TABLET ORAL 3 TIMES DAILY
Qty: 270 TABLET | Refills: 0 | Status: SHIPPED | OUTPATIENT
Start: 2025-01-30

## 2025-01-30 NOTE — TELEPHONE ENCOUNTER
"     Caller: Johnna Contreras \"Sarah\"    Relationship: Self    Best call back number: [phone]    Requested Prescriptions:   Requested Prescriptions     Pending Prescriptions Disp Refills    baclofen (LIORESAL) 10 MG tablet 90 tablet 2     Sig: Take 1 tablet by mouth 3 (Three) Times a Day.        Pharmacy where request should be sent: [facility] [id number] [address] [phone] [facility] [phone]      Last office visit with prescribing clinician: 1/30/2025   Last telemedicine visit with prescribing clinician: Visit date not found   Next office visit with prescribing clinician: 4/30/2025     Additional details provided by patient: REFILL WAS NOT CALLED IN AND SHE IS OUT    Does the patient have less than a 3 day supply:  [x] Yes  [] No    Would you like a call back once the refill request has been completed: [x] Yes [] No    If the office needs to give you a call back, can they leave a voicemail: [x] Yes [] No    Cameron Ellison Rep   01/30/25 14:09 EST      "

## 2025-01-30 NOTE — PROGRESS NOTES
Established Patient Office Visit      Patient Name: Johnna Contreras  : 1955   MRN: [id number]   Care Team: Patient Care Team:  Ras Padilla DO as PCP - General (Family Medicine)  Case, Sobeida ENCINAS DO as Consulting Physician (Pulmonary Disease)  Zackery Garcia DO ([facility] Facility)    Chief Complaint:    Chief Complaint   Patient presents with    Headache    Sore Throat     Ongoing for a few weeks    Cough       History of Present Illness: Johnna Contreras is a 69 y.o. female who is here today for chief complaint.    HPI    She was at [facility] 12/15    This patient is accompanied by their self who contributes to the history of their care.    The following portions of the patient's history were reviewed and updated as appropriate: allergies, current medications, past family history, past medical history, past social history, past surgical history and problem list.    Subjective      Review of Systems:   Review of Systems - See HPI    Past Medical History:   Past Medical History:   Diagnosis Date    Acute CVA (cerebrovascular accident)     Acute encephalopathy     Acute hypoxic respiratory failure     Acute metabolic encephalopathy     PAM (acute kidney injury)     Back problem     Bradycardia     Bronchitis     Claustrophobia     Constipation, unspecified     COPD exacerbation     COVID-19     Depression     Fall from wheelchair     GERD (gastroesophageal reflux disease)     Headache     Hemiplegia, unspecified affecting left dominant side     History of ectopic pregnancy     Hyperlipidemia     Hypertension     Ileus     Muscle weakness (generalized)     Personal history of other endocrine, nutritional and metabolic disease     Pneumonia due to E. coli     Seizures     last seizure at the age of 21    Stroke     Tobacco use     Traumatic hematoma of forehead        Past Surgical History:   Past Surgical History:   Procedure Laterality Date    DILATATION AND CURETTAGE      EYE SURGERY       TONSILLECTOMY      TUBAL ABDOMINAL LIGATION         Family History:   Family History   Problem Relation Age of Onset    Heart attack Mother     Stroke Mother     Hypertension Mother     Hyperlipidemia Mother     Diabetes Father     Breast cancer Paternal Grandmother     Cancer Paternal Grandmother     Stroke Paternal Grandmother     Heart attack Paternal Grandmother     Ovarian cancer Neg Hx        Social History:   Social History     Socioeconomic History    Marital status:    Tobacco Use    Smoking status: Every Day     Current packs/day: 1.00     Average packs/day: 1 pack/day for 50.0 years (50.0 ttl pk-yrs)     Types: Cigarettes     Passive exposure: Never    Smokeless tobacco: Never   Vaping Use    Vaping status: Never Used   Substance and Sexual Activity    Alcohol use: Yes     Comment: rarely     Drug use: No    Sexual activity: Defer       Tobacco History:   Social History     Tobacco Use   Smoking Status Every Day    Current packs/day: 1.00    Average packs/day: 1 pack/day for 50.0 years (50.0 ttl pk-yrs)    Types: Cigarettes    Passive exposure: Never   Smokeless Tobacco Never       Medications:   Outpatient Medications Prior to Visit   Medication Sig Dispense Refill    acetaminophen (TYLENOL) 325 MG tablet Take 2 tablets by mouth.      albuterol sulfate  (90 Base) MCG/ACT inhaler Inhale 2 puffs Every 6 (Six) Hours As Needed.      apixaban (ELIQUIS) 5 MG tablet tablet Take 1 tablet by mouth Every 12 (Twelve) Hours. 60 tablet 2    atorvastatin (LIPITOR) 20 MG tablet Take 1 tablet by mouth Every Night.      bisacodyl (DULCOLAX) 5 MG EC tablet Take 2 tablets by mouth Daily.      Cholecalciferol 10 MCG (400 UNIT) tablet Take 1 tablet by mouth Daily.      citalopram (CeleXA) 20 MG tablet Take 2 tablets by mouth Daily. 90 tablet 1    Fluticasone-Umeclidin-Vilant (TRELEGY) 100-62.5-25 MCG/ACT inhaler Inhale 1 puff Daily. 60 each 11    gabapentin (NEURONTIN) 300 MG capsule Take 1 capsule by mouth  every night at bedtime. 30 capsule 5    lisinopril (PRINIVIL,ZESTRIL) 10 MG tablet Take 2 tablets by mouth Daily. 90 tablet 1    multivitamin (THERAGRAN) tablet tablet Take 1 tablet by mouth Daily.      multivitamin with minerals tablet tablet Take 1 tablet by mouth Daily.      NIFEdipine XL (PROCARDIA XL) 30 MG 24 hr tablet Take 1 tablet by mouth Daily.      nitroglycerin (NITROSTAT) 0.4 MG SL tablet Place 1 tablet under the tongue Every 5 (Five) Minutes As Needed for Chest Pain. Take no more than 3 doses in 15 minutes.      omeprazole (priLOSEC) 20 MG capsule       pantoprazole (PROTONIX) 40 MG EC tablet Take 1 tablet by mouth Daily. 90 tablet 1    polyethylene glycol (MIRALAX) 17 g packet Take 17 g by mouth Daily.      povidone-iodine (BETADINE) 10 % external solution 10% Apply 1 Application topically to the appropriate area as directed As Needed for Wound Care. APPLY TO BALL OF LEFT FOOT DTI, COVER WITH ISLAND GUAZE BORDER DRESSING .      senna 8.6 MG tablet Take 1 tablet by mouth Daily.      tiotropium bromide monohydrate (Spiriva Respimat) 2.5 MCG/ACT aerosol solution inhaler Inhale 2 puffs Daily.      Turmeric 500 MG capsule Take 1 capsule by mouth 2 (Two) Times a Day. For inflammation/pain 60 capsule 11    baclofen (LIORESAL) 10 MG tablet Take 1 tablet by mouth 3 (Three) Times a Day. 90 tablet 2    amoxicillin-clavulanate (AUGMENTIN) 875-125 MG per tablet  (Patient not taking: Reported on 1/30/2025)      EPINEPHrine (EPIPEN) 0.3 MG/0.3ML solution auto-injector injection  (Patient not taking: Reported on 1/30/2025)      ibuprofen (ADVIL,MOTRIN) 400 MG tablet Take 1 tablet by mouth Every 4 (Four) Hours As Needed for Mild Pain. (Patient not taking: Reported on 1/30/2025)       No facility-administered medications prior to visit.        Allergies:   Allergies   Allergen Reactions    Peanut Allergen Powder-Dnfp Rash    Tree Nut Rash       Objective   Objective     Physical Exam:  Vital Signs:   Vitals:     "01/30/25 0931   BP: 118/72   Pulse: 93   SpO2: 94%   Weight: 51.3 kg (113 lb)   Height: 162.6 cm (64.02\")     Body mass index is 19.39 kg/m².     Physical Exam  Nursing note reviewed  Const: NAD, A&Ox4, Pleasant, Cooperative  Eyes: EOMI, no conjunctivitis  ENT: No nasal discharge present, neck supple  Cardiac: Regular rate and rhythm, no cyanosis  Procedures/Radiology     Procedures  No radiology results for the last 7 days     Assessment & Plan   Assessment / Plan      Assessment/Plan:   Problems Addressed This Visit  Diagnoses and all orders for this visit:    1. Sore throat (Primary)  -     POCT SARS-CoV-2 + Flu Antigen [place]  -     POC Rapid Strep A      Problem List Items Addressed This Visit    None  Visit Diagnoses       Sore throat    -  Primary    Relevant Orders    POCT SARS-CoV-2 + Flu Antigen C[place]TON (Completed)    POC Rapid Strep A (Completed)            There are no Patient Instructions on file for this visit.    Follow Up:   No follow-ups on file.      DO GURJIT Jackson RD  [facility]  [address] [facility] [address]  Fax [phone]  Phone [phone]    Disclaimer to patients: The 21st Century Cares Act makes medical notes like these available to patients in the interest of transparency. However, please be advised that this is still a medical document. It is intended as lyzx-bn-ooec communication. Many sections may include medical language or jargon, abbreviations, and additional verbiage that are unfamiliar or confusing. In some ways it may come across as blunt, direct, or may be summarized in order to clearly and concisely communicate the most crucial information to medical professionals. It may also include mentions of conditions that are unlikely but considered as part of the differential diagnosis, including serious disorders. These are not always discussed at length at the time of appointment because their likelihood " is so low, but may be included in a medical note to make it clear what has been considered and/or ruled out as part of a work-up. Medical documents are intended to carry relevant information, facts as evident, and the personal clinical opinion of the physician. If you have any questions regarding this medical document, please bring them to the attention of the physician at your next scheduled appointment.

## 2025-02-06 ENCOUNTER — TELEPHONE (OUTPATIENT)
Dept: FAMILY MEDICINE CLINIC | Facility: CLINIC | Age: 70
End: 2025-02-06
Payer: MEDICARE

## 2025-02-06 NOTE — TELEPHONE ENCOUNTER
Pt has fallen 3 times since the 28th of January - pt has some respiratory symptoms headache sinus congestion, heavy breathing, thick white mucus. Please call Liv back to advise

## 2025-02-06 NOTE — TELEPHONE ENCOUNTER
As patient has continued to have upper respiratory symptoms and breathing difficulty I do recommend a follow-up appointment as soon as possible, may schedule same-day or visit urgent care.

## 2025-02-07 NOTE — TELEPHONE ENCOUNTER
Spoke with patients , advised of recommendations. They drive with Wheels and will come In tomorrow for the UC.

## 2025-05-12 ENCOUNTER — TELEPHONE (OUTPATIENT)
Dept: FAMILY MEDICINE CLINIC | Facility: CLINIC | Age: 70
End: 2025-05-12
Payer: MEDICARE

## 2025-05-12 NOTE — TELEPHONE ENCOUNTER
“Please be informed that patient has passed. Patient has been marked  in the system. The date of death is: 2025    Caller: MAX BOOKER    Relationship: Emergency Contact    Best call back number: [phone]     Did the patient have surgery within 30 days of their passing (Y/N):

## 2025-05-12 NOTE — TELEPHONE ENCOUNTER
Provider: DAMIEN PORTILLO    Caller: MAX BOOKER    Relationship to Patient: Emergency Contact    Pharmacy:     Phone Number: [phone]     Reason for Call: PATIENT  CALLING TO LET PROVIDER KNOW PATIENT  ON 2025 AND HER INSURANCE IS NEEDING PAPER WORK

## 2025-05-29 ENCOUNTER — TELEPHONE (OUTPATIENT)
Dept: FAMILY MEDICINE CLINIC | Facility: CLINIC | Age: 70
End: 2025-05-29

## 2025-05-29 NOTE — TELEPHONE ENCOUNTER
Caller: [facility]    Relationship: Other    Best call back number: [phone]     What form or medical record are you requesting: INFORMATION ON PATIENT BEING ON OXYGEN    Who is requesting this form or medical record from you: SELF    How would you like to receive the form or medical records (pick-up, mail, fax): FAX  If fax, what is the fax number: [phone]      Timeframe paperwork needed: ASAP    Additional notes: WILL BE RE-FAXING TODAY, 5/29/25. PLEASE FILL OUT FULLY AND RE-FAX TO THE NUMBER ABOVE. PLEASE  CALL WITH ANY QUESTIONS OR WHEN COMPLETED.

## 2025-06-09 ENCOUNTER — TELEPHONE (OUTPATIENT)
Dept: FAMILY MEDICINE CLINIC | Facility: CLINIC | Age: 70
End: 2025-06-09
Payer: MEDICARE

## 2025-06-09 NOTE — TELEPHONE ENCOUNTER
CALLED AND STATED SHE WILL BE FAXING OVER PAPERWORK ABOUT PTS OXYGEN , SHE WANTS TO KNOW IF THE PT WAS ON OXYGEN BEFORE 09/03/2023 AND IF SHE WAS DID SHE REQUIRE IT BEFORE 09/03/2021-09/03/2023 AND WAS SHE ON IT DAILY.